# Patient Record
Sex: MALE | Race: WHITE | NOT HISPANIC OR LATINO | Employment: OTHER | URBAN - METROPOLITAN AREA
[De-identification: names, ages, dates, MRNs, and addresses within clinical notes are randomized per-mention and may not be internally consistent; named-entity substitution may affect disease eponyms.]

---

## 2017-01-19 ENCOUNTER — HOSPITAL ENCOUNTER (INPATIENT)
Facility: HOSPITAL | Age: 77
LOS: 12 days | Discharge: HOME HEALTH | End: 2017-01-31
Attending: SPECIALIST | Admitting: SPECIALIST

## 2017-01-19 ENCOUNTER — HOSPITAL ENCOUNTER (EMERGENCY)
Facility: HOSPITAL | Age: 77
Discharge: PSYCHIATRIC OR SUBSTANCE USE FACILITY | End: 2017-01-19
Attending: EMERGENCY MEDICINE | Admitting: EMERGENCY MEDICINE

## 2017-01-19 VITALS
HEART RATE: 81 BPM | TEMPERATURE: 97 F | SYSTOLIC BLOOD PRESSURE: 165 MMHG | BODY MASS INDEX: 29.62 KG/M2 | DIASTOLIC BLOOD PRESSURE: 83 MMHG | RESPIRATION RATE: 16 BRPM | WEIGHT: 200 LBS | OXYGEN SATURATION: 98 % | HEIGHT: 69 IN

## 2017-01-19 DIAGNOSIS — F32.9 MAJOR DEPRESSIVE DISORDER WITH SINGLE EPISODE, REMISSION STATUS UNSPECIFIED: Primary | ICD-10-CM

## 2017-01-19 PROBLEM — F32.A DEPRESSION WITH SUICIDAL IDEATION: Status: ACTIVE | Noted: 2017-01-19

## 2017-01-19 PROBLEM — R45.851 DEPRESSION WITH SUICIDAL IDEATION: Status: ACTIVE | Noted: 2017-01-19

## 2017-01-19 LAB
ALBUMIN SERPL-MCNC: 3.9 G/DL (ref 3.5–5.2)
ALBUMIN/GLOB SERPL: 1.4 G/DL
ALP SERPL-CCNC: 108 U/L (ref 39–117)
ALT SERPL W P-5'-P-CCNC: 19 U/L (ref 1–41)
AMPHET+METHAMPHET UR QL: NEGATIVE
ANION GAP SERPL CALCULATED.3IONS-SCNC: 12.4 MMOL/L
APAP SERPL-MCNC: <5 MCG/ML (ref 10–30)
AST SERPL-CCNC: 15 U/L (ref 1–40)
BACTERIA UR QL AUTO: ABNORMAL /HPF
BARBITURATES UR QL SCN: NEGATIVE
BASOPHILS # BLD AUTO: 0.02 10*3/MM3 (ref 0–0.2)
BASOPHILS NFR BLD AUTO: 0.2 % (ref 0–1.5)
BENZODIAZ UR QL SCN: NEGATIVE
BILIRUB SERPL-MCNC: 0.2 MG/DL (ref 0.1–1.2)
BILIRUB UR QL STRIP: NEGATIVE
BUN BLD-MCNC: 19 MG/DL (ref 8–23)
BUN/CREAT SERPL: 31.7 (ref 7–25)
CALCIUM SPEC-SCNC: 9.2 MG/DL (ref 8.6–10.5)
CANNABINOIDS SERPL QL: NEGATIVE
CHLORIDE SERPL-SCNC: 100 MMOL/L (ref 98–107)
CLARITY UR: CLEAR
CO2 SERPL-SCNC: 25.6 MMOL/L (ref 22–29)
COCAINE UR QL: NEGATIVE
COLOR UR: YELLOW
CREAT BLD-MCNC: 0.6 MG/DL (ref 0.76–1.27)
DEPRECATED RDW RBC AUTO: 51.7 FL (ref 37–54)
EOSINOPHIL # BLD AUTO: 0.09 10*3/MM3 (ref 0–0.7)
EOSINOPHIL NFR BLD AUTO: 1.1 % (ref 0.3–6.2)
ERYTHROCYTE [DISTWIDTH] IN BLOOD BY AUTOMATED COUNT: 14.9 % (ref 11.5–14.5)
ETHANOL BLD-MCNC: <10 MG/DL (ref 0–10)
ETHANOL UR QL: <0.01 %
GFR SERPL CREATININE-BSD FRML MDRD: 131 ML/MIN/1.73
GLOBULIN UR ELPH-MCNC: 2.8 GM/DL
GLUCOSE BLD-MCNC: 116 MG/DL (ref 65–99)
GLUCOSE UR STRIP-MCNC: NEGATIVE MG/DL
HCT VFR BLD AUTO: 49.7 % (ref 40.4–52.2)
HGB BLD-MCNC: 15.3 G/DL (ref 13.7–17.6)
HGB UR QL STRIP.AUTO: NEGATIVE
HOLD SPECIMEN: NORMAL
HYALINE CASTS UR QL AUTO: ABNORMAL /LPF
IMM GRANULOCYTES # BLD: 0.02 10*3/MM3 (ref 0–0.03)
IMM GRANULOCYTES NFR BLD: 0.2 % (ref 0–0.5)
KETONES UR QL STRIP: ABNORMAL
LEUKOCYTE ESTERASE UR QL STRIP.AUTO: ABNORMAL
LYMPHOCYTES # BLD AUTO: 0.86 10*3/MM3 (ref 0.9–4.8)
LYMPHOCYTES NFR BLD AUTO: 10.3 % (ref 19.6–45.3)
MCH RBC QN AUTO: 29.2 PG (ref 27–32.7)
MCHC RBC AUTO-ENTMCNC: 30.8 G/DL (ref 32.6–36.4)
MCV RBC AUTO: 94.8 FL (ref 79.8–96.2)
METHADONE UR QL SCN: NEGATIVE
MONOCYTES # BLD AUTO: 0.82 10*3/MM3 (ref 0.2–1.2)
MONOCYTES NFR BLD AUTO: 9.8 % (ref 5–12)
NEUTROPHILS # BLD AUTO: 6.55 10*3/MM3 (ref 1.9–8.1)
NEUTROPHILS NFR BLD AUTO: 78.4 % (ref 42.7–76)
NITRITE UR QL STRIP: NEGATIVE
NRBC BLD MANUAL-RTO: 0 /100 WBC (ref 0–0)
OPIATES UR QL: NEGATIVE
OXYCODONE UR QL SCN: NEGATIVE
PH UR STRIP.AUTO: 7.5 [PH] (ref 5–8)
PLATELET # BLD AUTO: 228 10*3/MM3 (ref 140–500)
PMV BLD AUTO: 10.2 FL (ref 6–12)
POTASSIUM BLD-SCNC: 4.1 MMOL/L (ref 3.5–5.2)
PROT SERPL-MCNC: 6.7 G/DL (ref 6–8.5)
PROT UR QL STRIP: NEGATIVE
RBC # BLD AUTO: 5.24 10*6/MM3 (ref 4.6–6)
RBC # UR: ABNORMAL /HPF
REF LAB TEST METHOD: ABNORMAL
SALICYLATES SERPL-MCNC: <0.3 MG/DL
SODIUM BLD-SCNC: 138 MMOL/L (ref 136–145)
SP GR UR STRIP: 1.01 (ref 1–1.03)
SQUAMOUS #/AREA URNS HPF: ABNORMAL /HPF
UROBILINOGEN UR QL STRIP: ABNORMAL
WBC NRBC COR # BLD: 8.36 10*3/MM3 (ref 4.5–10.7)
WBC UR QL AUTO: ABNORMAL /HPF
WHOLE BLOOD HOLD SPECIMEN: NORMAL
WHOLE BLOOD HOLD SPECIMEN: NORMAL

## 2017-01-19 RX ORDER — BACLOFEN 20 MG/1
20 TABLET ORAL ONCE
Status: COMPLETED | OUTPATIENT
Start: 2017-01-19 | End: 2017-01-20

## 2017-01-19 RX ORDER — METFORMIN HYDROCHLORIDE 500 MG/1
1000 TABLET, EXTENDED RELEASE ORAL ONCE
Status: DISCONTINUED | OUTPATIENT
Start: 2017-01-19 | End: 2017-01-20 | Stop reason: SDUPTHER

## 2017-01-19 RX ORDER — PRAZOSIN HYDROCHLORIDE 1 MG/1
1 CAPSULE ORAL ONCE
Status: COMPLETED | OUTPATIENT
Start: 2017-01-19 | End: 2017-01-20

## 2017-01-19 RX ORDER — TAMSULOSIN HYDROCHLORIDE 0.4 MG/1
0.4 CAPSULE ORAL ONCE
Status: DISCONTINUED | OUTPATIENT
Start: 2017-01-19 | End: 2017-01-20 | Stop reason: SDUPTHER

## 2017-01-19 RX ORDER — CALCIUM POLYCARBOPHIL 625 MG 625 MG/1
1250 TABLET ORAL ONCE
Status: DISCONTINUED | OUTPATIENT
Start: 2017-01-19 | End: 2017-01-20 | Stop reason: SDUPTHER

## 2017-01-19 RX ORDER — IPRATROPIUM BROMIDE AND ALBUTEROL SULFATE 2.5; .5 MG/3ML; MG/3ML
3 SOLUTION RESPIRATORY (INHALATION)
Status: DISCONTINUED | OUTPATIENT
Start: 2017-01-19 | End: 2017-01-31 | Stop reason: HOSPADM

## 2017-01-19 RX ORDER — TRAZODONE HYDROCHLORIDE 100 MG/1
100 TABLET ORAL ONCE
Status: COMPLETED | OUTPATIENT
Start: 2017-01-19 | End: 2017-01-20

## 2017-01-19 RX ORDER — DOCUSATE CALCIUM 240 MG
240 CAPSULE ORAL ONCE
Status: DISCONTINUED | OUTPATIENT
Start: 2017-01-19 | End: 2017-01-20 | Stop reason: SDUPTHER

## 2017-01-19 RX ORDER — POTASSIUM CHLORIDE 750 MG/1
40 CAPSULE, EXTENDED RELEASE ORAL ONCE
Status: DISCONTINUED | OUTPATIENT
Start: 2017-01-19 | End: 2017-01-31 | Stop reason: HOSPADM

## 2017-01-19 RX ORDER — ZOLPIDEM TARTRATE 5 MG/1
5 TABLET ORAL NIGHTLY
Status: DISPENSED | OUTPATIENT
Start: 2017-01-19 | End: 2017-01-23

## 2017-01-20 PROBLEM — I10 HYPERTENSION: Status: ACTIVE | Noted: 2017-01-20

## 2017-01-20 PROBLEM — G80.9 CEREBRAL PALSY (HCC): Status: ACTIVE | Noted: 2017-01-20

## 2017-01-20 PROBLEM — K59.01 SLOW TRANSIT CONSTIPATION: Status: ACTIVE | Noted: 2017-01-20

## 2017-01-20 LAB
GLUCOSE BLDC GLUCOMTR-MCNC: 109 MG/DL (ref 70–130)
GLUCOSE BLDC GLUCOMTR-MCNC: 111 MG/DL (ref 70–130)
GLUCOSE BLDC GLUCOMTR-MCNC: 125 MG/DL (ref 70–130)
GLUCOSE BLDC GLUCOMTR-MCNC: 92 MG/DL (ref 70–130)

## 2017-01-20 PROCEDURE — 94640 AIRWAY INHALATION TREATMENT: CPT

## 2017-01-20 PROCEDURE — 74000 HC ABDOMEN KUB: CPT

## 2017-01-20 PROCEDURE — 82962 GLUCOSE BLOOD TEST: CPT

## 2017-01-20 PROCEDURE — 94799 UNLISTED PULMONARY SVC/PX: CPT

## 2017-01-20 RX ORDER — DOCUSATE SODIUM 250 MG
250 CAPSULE ORAL 2 TIMES DAILY
Status: DISCONTINUED | OUTPATIENT
Start: 2017-01-20 | End: 2017-01-31 | Stop reason: HOSPADM

## 2017-01-20 RX ORDER — TRAZODONE HYDROCHLORIDE 100 MG/1
100 TABLET ORAL NIGHTLY
Status: ON HOLD | COMMUNITY
End: 2021-11-06

## 2017-01-20 RX ORDER — METOPROLOL SUCCINATE 100 MG/1
100 TABLET, EXTENDED RELEASE ORAL DAILY
Status: ON HOLD | COMMUNITY
End: 2021-11-06

## 2017-01-20 RX ORDER — ALBUTEROL SULFATE 4 MG/1
4 TABLET ORAL 3 TIMES DAILY
Status: DISCONTINUED | OUTPATIENT
Start: 2017-01-20 | End: 2017-01-31 | Stop reason: HOSPADM

## 2017-01-20 RX ORDER — CIPROFLOXACIN 250 MG/1
250 TABLET, FILM COATED ORAL 2 TIMES DAILY
Status: ON HOLD | COMMUNITY
End: 2021-11-06

## 2017-01-20 RX ORDER — PRAZOSIN HYDROCHLORIDE 1 MG/1
1 CAPSULE ORAL 2 TIMES DAILY
Status: DISCONTINUED | OUTPATIENT
Start: 2017-01-20 | End: 2017-01-31 | Stop reason: HOSPADM

## 2017-01-20 RX ORDER — SENNA AND DOCUSATE SODIUM 50; 8.6 MG/1; MG/1
2 TABLET, FILM COATED ORAL NIGHTLY
Status: DISCONTINUED | OUTPATIENT
Start: 2017-01-20 | End: 2017-01-22

## 2017-01-20 RX ORDER — ASCORBIC ACID 500 MG
500 TABLET ORAL DAILY
Status: ON HOLD | COMMUNITY
End: 2021-11-06

## 2017-01-20 RX ORDER — DOCUSATE SODIUM 250 MG
250 CAPSULE ORAL 2 TIMES DAILY
Status: ON HOLD | COMMUNITY
End: 2021-11-06

## 2017-01-20 RX ORDER — PRAZOSIN HYDROCHLORIDE 1 MG/1
1 CAPSULE ORAL NIGHTLY
Status: DISCONTINUED | OUTPATIENT
Start: 2017-01-20 | End: 2017-01-20 | Stop reason: SDUPTHER

## 2017-01-20 RX ORDER — BACLOFEN 20 MG/1
20 TABLET ORAL 3 TIMES DAILY
Status: DISCONTINUED | OUTPATIENT
Start: 2017-01-20 | End: 2017-01-31 | Stop reason: HOSPADM

## 2017-01-20 RX ORDER — METFORMIN HYDROCHLORIDE 500 MG/1
1000 TABLET, EXTENDED RELEASE ORAL 2 TIMES DAILY
Status: ON HOLD | COMMUNITY
End: 2021-11-06

## 2017-01-20 RX ORDER — CETIRIZINE HYDROCHLORIDE 10 MG/1
10 TABLET ORAL DAILY
Status: DISCONTINUED | OUTPATIENT
Start: 2017-01-20 | End: 2017-01-31 | Stop reason: HOSPADM

## 2017-01-20 RX ORDER — TRIAMCINOLONE ACETONIDE 1 MG/G
1 CREAM TOPICAL 2 TIMES DAILY
Status: DISCONTINUED | OUTPATIENT
Start: 2017-01-20 | End: 2017-01-31 | Stop reason: HOSPADM

## 2017-01-20 RX ORDER — PRAVASTATIN SODIUM 20 MG
40 TABLET ORAL NIGHTLY
COMMUNITY
End: 2021-11-10 | Stop reason: HOSPADM

## 2017-01-20 RX ORDER — LACTULOSE 10 G/15ML
10 SOLUTION ORAL NIGHTLY
Status: ON HOLD | COMMUNITY
End: 2021-11-06

## 2017-01-20 RX ORDER — POLYETHYLENE GLYCOL 3350 17 G/17G
17 POWDER, FOR SOLUTION ORAL 2 TIMES DAILY
Status: DISCONTINUED | OUTPATIENT
Start: 2017-01-20 | End: 2017-01-22

## 2017-01-20 RX ORDER — ALBUTEROL SULFATE 4 MG/1
4 TABLET ORAL 3 TIMES DAILY
Status: ON HOLD | COMMUNITY
End: 2021-11-06

## 2017-01-20 RX ORDER — PRAZOSIN HYDROCHLORIDE 1 MG/1
1 CAPSULE ORAL 2 TIMES DAILY
Status: ON HOLD | COMMUNITY
End: 2021-11-06

## 2017-01-20 RX ORDER — ALFUZOSIN HYDROCHLORIDE 10 MG/1
10 TABLET, EXTENDED RELEASE ORAL NIGHTLY
COMMUNITY
End: 2021-11-10 | Stop reason: HOSPADM

## 2017-01-20 RX ORDER — BACLOFEN 20 MG/1
20 TABLET ORAL 3 TIMES DAILY
COMMUNITY
End: 2021-11-10 | Stop reason: HOSPADM

## 2017-01-20 RX ORDER — PSYLLIUM SEED (WITH DEXTROSE)
2 POWDER (GRAM) ORAL 2 TIMES DAILY
Status: DISCONTINUED | OUTPATIENT
Start: 2017-01-20 | End: 2017-01-20

## 2017-01-20 RX ORDER — CIPROFLOXACIN 250 MG/1
250 TABLET, FILM COATED ORAL EVERY 12 HOURS SCHEDULED
Status: DISCONTINUED | OUTPATIENT
Start: 2017-01-20 | End: 2017-01-31 | Stop reason: HOSPADM

## 2017-01-20 RX ORDER — POTASSIUM CHLORIDE 750 MG/1
20 CAPSULE, EXTENDED RELEASE ORAL DAILY
Status: DISCONTINUED | OUTPATIENT
Start: 2017-01-20 | End: 2017-01-31 | Stop reason: HOSPADM

## 2017-01-20 RX ORDER — LACTULOSE 10 G/15ML
30 SOLUTION ORAL 2 TIMES DAILY
Status: DISCONTINUED | OUTPATIENT
Start: 2017-01-20 | End: 2017-01-22

## 2017-01-20 RX ORDER — LORATADINE 10 MG/1
10 CAPSULE, LIQUID FILLED ORAL DAILY
COMMUNITY
End: 2021-11-10 | Stop reason: HOSPADM

## 2017-01-20 RX ORDER — ASCORBIC ACID 500 MG
500 TABLET ORAL DAILY
Status: DISCONTINUED | OUTPATIENT
Start: 2017-01-20 | End: 2017-01-31 | Stop reason: HOSPADM

## 2017-01-20 RX ORDER — LACTULOSE 10 G/15ML
10 SOLUTION ORAL NIGHTLY
Status: DISCONTINUED | OUTPATIENT
Start: 2017-01-20 | End: 2017-01-20

## 2017-01-20 RX ORDER — LISINOPRIL 2.5 MG/1
2.5 TABLET ORAL DAILY
Status: ON HOLD | COMMUNITY
End: 2021-11-06

## 2017-01-20 RX ORDER — METFORMIN HYDROCHLORIDE 500 MG/1
1000 TABLET, EXTENDED RELEASE ORAL 2 TIMES DAILY
Status: DISCONTINUED | OUTPATIENT
Start: 2017-01-20 | End: 2017-01-31 | Stop reason: HOSPADM

## 2017-01-20 RX ORDER — POTASSIUM CHLORIDE 20 MEQ/1
40 TABLET, EXTENDED RELEASE ORAL 2 TIMES DAILY
Status: ON HOLD | COMMUNITY
End: 2021-11-06

## 2017-01-20 RX ORDER — TRIAMCINOLONE ACETONIDE 1 MG/G
1 CREAM TOPICAL 2 TIMES DAILY
Status: ON HOLD | COMMUNITY
End: 2021-11-06

## 2017-01-20 RX ORDER — BACLOFEN 20 MG/1
20 TABLET ORAL DAILY
Status: DISCONTINUED | OUTPATIENT
Start: 2017-01-20 | End: 2017-01-20 | Stop reason: SDUPTHER

## 2017-01-20 RX ORDER — ZOLPIDEM TARTRATE 10 MG/1
10 TABLET ORAL NIGHTLY
Status: ON HOLD | COMMUNITY
End: 2021-11-06

## 2017-01-20 RX ORDER — FLUTICASONE PROPIONATE 50 MCG
1 SPRAY, SUSPENSION (ML) NASAL DAILY
Status: ON HOLD | COMMUNITY
End: 2021-11-06

## 2017-01-20 RX ORDER — FLUTICASONE PROPIONATE 50 MCG
1 SPRAY, SUSPENSION (ML) NASAL DAILY
Status: DISCONTINUED | OUTPATIENT
Start: 2017-01-20 | End: 2017-01-31 | Stop reason: HOSPADM

## 2017-01-20 RX ORDER — PRAVASTATIN SODIUM 20 MG
20 TABLET ORAL NIGHTLY
Status: DISCONTINUED | OUTPATIENT
Start: 2017-01-20 | End: 2017-01-31 | Stop reason: HOSPADM

## 2017-01-20 RX ORDER — ZOLPIDEM TARTRATE 10 MG/1
10 TABLET ORAL NIGHTLY
Status: DISCONTINUED | OUTPATIENT
Start: 2017-01-20 | End: 2017-01-20 | Stop reason: SDUPTHER

## 2017-01-20 RX ORDER — ACETAMINOPHEN 325 MG/1
650 TABLET ORAL EVERY 6 HOURS PRN
Status: DISCONTINUED | OUTPATIENT
Start: 2017-01-20 | End: 2017-01-31 | Stop reason: HOSPADM

## 2017-01-20 RX ORDER — BISACODYL 10 MG
10 SUPPOSITORY, RECTAL RECTAL DAILY PRN
Status: DISCONTINUED | OUTPATIENT
Start: 2017-01-20 | End: 2017-01-31 | Stop reason: HOSPADM

## 2017-01-20 RX ORDER — BUPROPION HYDROCHLORIDE 150 MG/1
450 TABLET ORAL EVERY MORNING
COMMUNITY
End: 2017-01-31 | Stop reason: HOSPADM

## 2017-01-20 RX ORDER — SERTRALINE HYDROCHLORIDE 100 MG/1
200 TABLET, FILM COATED ORAL DAILY
Status: DISCONTINUED | OUTPATIENT
Start: 2017-01-20 | End: 2017-01-31 | Stop reason: HOSPADM

## 2017-01-20 RX ORDER — PSYLLIUM SEED (WITH DEXTROSE)
2 POWDER (GRAM) ORAL 2 TIMES DAILY
Status: ON HOLD | COMMUNITY
End: 2021-11-06

## 2017-01-20 RX ORDER — LISINOPRIL 2.5 MG/1
2.5 TABLET ORAL DAILY
Status: DISCONTINUED | OUTPATIENT
Start: 2017-01-20 | End: 2017-01-31 | Stop reason: HOSPADM

## 2017-01-20 RX ORDER — METOPROLOL SUCCINATE 100 MG/1
100 TABLET, EXTENDED RELEASE ORAL DAILY
Status: DISCONTINUED | OUTPATIENT
Start: 2017-01-20 | End: 2017-01-31 | Stop reason: HOSPADM

## 2017-01-20 RX ADMIN — PRAZOSIN HYDROCHLORIDE 1 MG: 1 CAPSULE ORAL at 00:34

## 2017-01-20 RX ADMIN — CIPROFLOXACIN 250 MG: 250 TABLET, FILM COATED ORAL at 20:16

## 2017-01-20 RX ADMIN — CALCIUM CARBONATE-CHOLECALCIFEROL TAB 250 MG-125 UNIT 2 TABLET: 250-125 TAB at 20:16

## 2017-01-20 RX ADMIN — PRAVASTATIN SODIUM 20 MG: 20 TABLET ORAL at 20:16

## 2017-01-20 RX ADMIN — IPRATROPIUM BROMIDE AND ALBUTEROL SULFATE 3 ML: .5; 3 SOLUTION RESPIRATORY (INHALATION) at 12:20

## 2017-01-20 RX ADMIN — PRAZOSIN HYDROCHLORIDE 1 MG: 1 CAPSULE ORAL at 13:09

## 2017-01-20 RX ADMIN — BACLOFEN 20 MG: 20 TABLET ORAL at 00:33

## 2017-01-20 RX ADMIN — PRAZOSIN HYDROCHLORIDE 1 MG: 1 CAPSULE ORAL at 20:16

## 2017-01-20 RX ADMIN — ZOLPIDEM TARTRATE 5 MG: 5 TABLET, FILM COATED ORAL at 22:13

## 2017-01-20 RX ADMIN — TRAZODONE HYDROCHLORIDE 100 MG: 100 TABLET, FILM COATED ORAL at 00:34

## 2017-01-20 RX ADMIN — DOCUSATE SODIUM 250 MG: 250 CAPSULE, LIQUID FILLED ORAL at 13:08

## 2017-01-20 RX ADMIN — TRIAMCINOLONE ACETONIDE 1 APPLICATION: 1 CREAM TOPICAL at 20:24

## 2017-01-20 RX ADMIN — LISINOPRIL 2.5 MG: 2.5 TABLET ORAL at 13:09

## 2017-01-20 RX ADMIN — IPRATROPIUM BROMIDE AND ALBUTEROL SULFATE 3 ML: .5; 3 SOLUTION RESPIRATORY (INHALATION) at 21:39

## 2017-01-20 RX ADMIN — Medication 2 WAFER: at 13:10

## 2017-01-20 RX ADMIN — METOPROLOL SUCCINATE 100 MG: 100 TABLET, FILM COATED, EXTENDED RELEASE ORAL at 13:10

## 2017-01-20 RX ADMIN — BACLOFEN 20 MG: 20 TABLET ORAL at 20:16

## 2017-01-20 RX ADMIN — METFORMIN HYDROCHLORIDE 1000 MG: 500 TABLET, EXTENDED RELEASE ORAL at 17:37

## 2017-01-20 RX ADMIN — CALCIUM CARBONATE-CHOLECALCIFEROL TAB 250 MG-125 UNIT 2 TABLET: 250-125 TAB at 16:06

## 2017-01-20 RX ADMIN — ACETAMINOPHEN 650 MG: 325 TABLET ORAL at 16:06

## 2017-01-20 RX ADMIN — IPRATROPIUM BROMIDE AND ALBUTEROL SULFATE 3 ML: .5; 3 SOLUTION RESPIRATORY (INHALATION) at 15:15

## 2017-01-20 RX ADMIN — ALBUTEROL SULFATE 4 MG: 4 TABLET ORAL at 16:06

## 2017-01-20 RX ADMIN — LACTULOSE 30 G: 10 SOLUTION ORAL at 17:36

## 2017-01-20 RX ADMIN — BACLOFEN 20 MG: 20 TABLET ORAL at 13:10

## 2017-01-20 RX ADMIN — POLYETHYLENE GLYCOL 3350 17 G: 17 POWDER, FOR SOLUTION ORAL at 17:36

## 2017-01-20 RX ADMIN — FLUTICASONE PROPIONATE 1 SPRAY: 50 SPRAY, METERED NASAL at 13:10

## 2017-01-21 LAB
GLUCOSE BLDC GLUCOMTR-MCNC: 102 MG/DL (ref 70–130)
GLUCOSE BLDC GLUCOMTR-MCNC: 110 MG/DL (ref 70–130)
GLUCOSE BLDC GLUCOMTR-MCNC: 116 MG/DL (ref 70–130)
GLUCOSE BLDC GLUCOMTR-MCNC: 149 MG/DL (ref 70–130)

## 2017-01-21 PROCEDURE — 94799 UNLISTED PULMONARY SVC/PX: CPT

## 2017-01-21 PROCEDURE — 94640 AIRWAY INHALATION TREATMENT: CPT

## 2017-01-21 PROCEDURE — 82962 GLUCOSE BLOOD TEST: CPT

## 2017-01-21 RX ADMIN — DOCUSATE SODIUM -SENNOSIDES 2 TABLET: 50; 8.6 TABLET, COATED ORAL at 00:48

## 2017-01-21 RX ADMIN — BACLOFEN 20 MG: 20 TABLET ORAL at 20:26

## 2017-01-21 RX ADMIN — FLUTICASONE PROPIONATE 1 SPRAY: 50 SPRAY, METERED NASAL at 09:52

## 2017-01-21 RX ADMIN — SERTRALINE 200 MG: 100 TABLET, FILM COATED ORAL at 09:49

## 2017-01-21 RX ADMIN — CIPROFLOXACIN 250 MG: 250 TABLET, FILM COATED ORAL at 09:52

## 2017-01-21 RX ADMIN — CIPROFLOXACIN 250 MG: 250 TABLET, FILM COATED ORAL at 20:26

## 2017-01-21 RX ADMIN — IPRATROPIUM BROMIDE AND ALBUTEROL SULFATE 3 ML: .5; 3 SOLUTION RESPIRATORY (INHALATION) at 16:30

## 2017-01-21 RX ADMIN — OXYCODONE HYDROCHLORIDE AND ACETAMINOPHEN 500 MG: 500 TABLET ORAL at 09:50

## 2017-01-21 RX ADMIN — BACLOFEN 20 MG: 20 TABLET ORAL at 09:52

## 2017-01-21 RX ADMIN — METFORMIN HYDROCHLORIDE 1000 MG: 500 TABLET, EXTENDED RELEASE ORAL at 17:28

## 2017-01-21 RX ADMIN — POLYETHYLENE GLYCOL 3350 17 G: 17 POWDER, FOR SOLUTION ORAL at 09:58

## 2017-01-21 RX ADMIN — METFORMIN HYDROCHLORIDE 1000 MG: 500 TABLET, EXTENDED RELEASE ORAL at 09:51

## 2017-01-21 RX ADMIN — ZOLPIDEM TARTRATE 5 MG: 5 TABLET, FILM COATED ORAL at 20:26

## 2017-01-21 RX ADMIN — POTASSIUM CHLORIDE 20 MEQ: 750 CAPSULE, EXTENDED RELEASE ORAL at 09:51

## 2017-01-21 RX ADMIN — METOPROLOL SUCCINATE 100 MG: 100 TABLET, FILM COATED, EXTENDED RELEASE ORAL at 09:50

## 2017-01-21 RX ADMIN — CETIRIZINE HYDROCHLORIDE 10 MG: 10 TABLET, FILM COATED ORAL at 09:51

## 2017-01-21 RX ADMIN — CALCIUM CARBONATE-CHOLECALCIFEROL TAB 250 MG-125 UNIT 2 TABLET: 250-125 TAB at 09:50

## 2017-01-21 RX ADMIN — PRAZOSIN HYDROCHLORIDE 1 MG: 1 CAPSULE ORAL at 17:27

## 2017-01-21 RX ADMIN — DOCUSATE SODIUM 250 MG: 250 CAPSULE, LIQUID FILLED ORAL at 09:52

## 2017-01-21 RX ADMIN — IPRATROPIUM BROMIDE AND ALBUTEROL SULFATE 3 ML: .5; 3 SOLUTION RESPIRATORY (INHALATION) at 11:58

## 2017-01-21 RX ADMIN — DOCUSATE SODIUM -SENNOSIDES 2 TABLET: 50; 8.6 TABLET, COATED ORAL at 20:26

## 2017-01-21 RX ADMIN — ALBUTEROL SULFATE 4 MG: 4 TABLET ORAL at 09:49

## 2017-01-21 RX ADMIN — CALCIUM CARBONATE-CHOLECALCIFEROL TAB 250 MG-125 UNIT 2 TABLET: 250-125 TAB at 17:27

## 2017-01-21 RX ADMIN — CALCIUM CARBONATE-CHOLECALCIFEROL TAB 250 MG-125 UNIT 2 TABLET: 250-125 TAB at 20:26

## 2017-01-21 RX ADMIN — ALBUTEROL SULFATE 4 MG: 4 TABLET ORAL at 20:26

## 2017-01-21 RX ADMIN — PRAZOSIN HYDROCHLORIDE 1 MG: 1 CAPSULE ORAL at 09:52

## 2017-01-21 RX ADMIN — BACLOFEN 20 MG: 20 TABLET ORAL at 17:27

## 2017-01-21 RX ADMIN — PRAVASTATIN SODIUM 20 MG: 20 TABLET ORAL at 20:26

## 2017-01-21 RX ADMIN — BUPROPION HYDROCHLORIDE 450 MG: 300 TABLET, EXTENDED RELEASE ORAL at 09:50

## 2017-01-21 RX ADMIN — LISINOPRIL 2.5 MG: 2.5 TABLET ORAL at 09:54

## 2017-01-21 RX ADMIN — IPRATROPIUM BROMIDE AND ALBUTEROL SULFATE 3 ML: .5; 3 SOLUTION RESPIRATORY (INHALATION) at 08:37

## 2017-01-21 RX ADMIN — LACTULOSE 30 G: 10 SOLUTION ORAL at 09:52

## 2017-01-21 RX ADMIN — TRIAMCINOLONE ACETONIDE 1 APPLICATION: 1 CREAM TOPICAL at 09:52

## 2017-01-21 RX ADMIN — IPRATROPIUM BROMIDE AND ALBUTEROL SULFATE 3 ML: .5; 3 SOLUTION RESPIRATORY (INHALATION) at 19:49

## 2017-01-21 RX ADMIN — ALBUTEROL SULFATE 4 MG: 4 TABLET ORAL at 17:28

## 2017-01-22 LAB
GLUCOSE BLDC GLUCOMTR-MCNC: 101 MG/DL (ref 70–130)
GLUCOSE BLDC GLUCOMTR-MCNC: 108 MG/DL (ref 70–130)
GLUCOSE BLDC GLUCOMTR-MCNC: 129 MG/DL (ref 70–130)

## 2017-01-22 PROCEDURE — 94640 AIRWAY INHALATION TREATMENT: CPT

## 2017-01-22 PROCEDURE — 82962 GLUCOSE BLOOD TEST: CPT

## 2017-01-22 RX ORDER — LACTULOSE 10 G/15ML
15 SOLUTION ORAL DAILY
Status: DISCONTINUED | OUTPATIENT
Start: 2017-01-23 | End: 2017-01-31 | Stop reason: HOSPADM

## 2017-01-22 RX ADMIN — LISINOPRIL 2.5 MG: 2.5 TABLET ORAL at 09:14

## 2017-01-22 RX ADMIN — BACLOFEN 20 MG: 20 TABLET ORAL at 09:11

## 2017-01-22 RX ADMIN — BUPROPION HYDROCHLORIDE 450 MG: 300 TABLET, EXTENDED RELEASE ORAL at 09:10

## 2017-01-22 RX ADMIN — PRAVASTATIN SODIUM 20 MG: 20 TABLET ORAL at 20:43

## 2017-01-22 RX ADMIN — BACLOFEN 20 MG: 20 TABLET ORAL at 20:43

## 2017-01-22 RX ADMIN — CALCIUM CARBONATE-CHOLECALCIFEROL TAB 250 MG-125 UNIT 2 TABLET: 250-125 TAB at 10:15

## 2017-01-22 RX ADMIN — IPRATROPIUM BROMIDE AND ALBUTEROL SULFATE 3 ML: .5; 3 SOLUTION RESPIRATORY (INHALATION) at 06:37

## 2017-01-22 RX ADMIN — FLUTICASONE PROPIONATE 1 SPRAY: 50 SPRAY, METERED NASAL at 10:20

## 2017-01-22 RX ADMIN — CALCIUM CARBONATE-CHOLECALCIFEROL TAB 250 MG-125 UNIT 2 TABLET: 250-125 TAB at 20:43

## 2017-01-22 RX ADMIN — DOCUSATE SODIUM 250 MG: 250 CAPSULE, LIQUID FILLED ORAL at 18:02

## 2017-01-22 RX ADMIN — PRAZOSIN HYDROCHLORIDE 1 MG: 1 CAPSULE ORAL at 09:15

## 2017-01-22 RX ADMIN — METFORMIN HYDROCHLORIDE 1000 MG: 500 TABLET, EXTENDED RELEASE ORAL at 18:02

## 2017-01-22 RX ADMIN — OXYCODONE HYDROCHLORIDE AND ACETAMINOPHEN 500 MG: 500 TABLET ORAL at 09:15

## 2017-01-22 RX ADMIN — CIPROFLOXACIN 250 MG: 250 TABLET, FILM COATED ORAL at 20:43

## 2017-01-22 RX ADMIN — TRIAMCINOLONE ACETONIDE 1 APPLICATION: 1 CREAM TOPICAL at 10:21

## 2017-01-22 RX ADMIN — CALCIUM CARBONATE-CHOLECALCIFEROL TAB 250 MG-125 UNIT 2 TABLET: 250-125 TAB at 15:30

## 2017-01-22 RX ADMIN — TRIAMCINOLONE ACETONIDE 1 APPLICATION: 1 CREAM TOPICAL at 18:02

## 2017-01-22 RX ADMIN — METOPROLOL SUCCINATE 100 MG: 100 TABLET, FILM COATED, EXTENDED RELEASE ORAL at 09:17

## 2017-01-22 RX ADMIN — SERTRALINE 200 MG: 100 TABLET, FILM COATED ORAL at 09:20

## 2017-01-22 RX ADMIN — PRAZOSIN HYDROCHLORIDE 1 MG: 1 CAPSULE ORAL at 20:43

## 2017-01-22 RX ADMIN — IPRATROPIUM BROMIDE AND ALBUTEROL SULFATE 3 ML: .5; 3 SOLUTION RESPIRATORY (INHALATION) at 10:38

## 2017-01-22 RX ADMIN — CIPROFLOXACIN 250 MG: 250 TABLET, FILM COATED ORAL at 09:15

## 2017-01-22 RX ADMIN — ALBUTEROL SULFATE 4 MG: 4 TABLET ORAL at 14:38

## 2017-01-22 RX ADMIN — IPRATROPIUM BROMIDE AND ALBUTEROL SULFATE 3 ML: .5; 3 SOLUTION RESPIRATORY (INHALATION) at 19:46

## 2017-01-22 RX ADMIN — METFORMIN HYDROCHLORIDE 1000 MG: 500 TABLET, EXTENDED RELEASE ORAL at 10:15

## 2017-01-22 RX ADMIN — ZOLPIDEM TARTRATE 5 MG: 5 TABLET, FILM COATED ORAL at 20:44

## 2017-01-22 RX ADMIN — CETIRIZINE HYDROCHLORIDE 10 MG: 10 TABLET, FILM COATED ORAL at 09:14

## 2017-01-22 RX ADMIN — POTASSIUM CHLORIDE 20 MEQ: 750 CAPSULE, EXTENDED RELEASE ORAL at 10:15

## 2017-01-22 RX ADMIN — BACLOFEN 20 MG: 20 TABLET ORAL at 16:47

## 2017-01-23 ENCOUNTER — DOCUMENTATION (OUTPATIENT)
Dept: PSYCHIATRY | Facility: HOSPITAL | Age: 77
End: 2017-01-23

## 2017-01-23 LAB
GLUCOSE BLDC GLUCOMTR-MCNC: 127 MG/DL (ref 70–130)
GLUCOSE BLDC GLUCOMTR-MCNC: 138 MG/DL (ref 70–130)
GLUCOSE BLDC GLUCOMTR-MCNC: 87 MG/DL (ref 70–130)
GLUCOSE BLDC GLUCOMTR-MCNC: 95 MG/DL (ref 70–130)

## 2017-01-23 PROCEDURE — 82962 GLUCOSE BLOOD TEST: CPT

## 2017-01-23 PROCEDURE — 94799 UNLISTED PULMONARY SVC/PX: CPT

## 2017-01-23 PROCEDURE — 94640 AIRWAY INHALATION TREATMENT: CPT

## 2017-01-23 RX ADMIN — TRIAMCINOLONE ACETONIDE 1 APPLICATION: 1 CREAM TOPICAL at 09:53

## 2017-01-23 RX ADMIN — ALBUTEROL SULFATE 4 MG: 4 TABLET ORAL at 20:24

## 2017-01-23 RX ADMIN — SERTRALINE 200 MG: 100 TABLET, FILM COATED ORAL at 09:29

## 2017-01-23 RX ADMIN — PRAZOSIN HYDROCHLORIDE 1 MG: 1 CAPSULE ORAL at 17:55

## 2017-01-23 RX ADMIN — POTASSIUM CHLORIDE 20 MEQ: 750 CAPSULE, EXTENDED RELEASE ORAL at 09:42

## 2017-01-23 RX ADMIN — BUPROPION HYDROCHLORIDE 450 MG: 300 TABLET, EXTENDED RELEASE ORAL at 09:29

## 2017-01-23 RX ADMIN — METOPROLOL SUCCINATE 100 MG: 100 TABLET, FILM COATED, EXTENDED RELEASE ORAL at 09:30

## 2017-01-23 RX ADMIN — ALBUTEROL SULFATE 4 MG: 4 TABLET ORAL at 15:15

## 2017-01-23 RX ADMIN — LACTULOSE 15.33 G: 10 SOLUTION ORAL at 09:45

## 2017-01-23 RX ADMIN — ALBUTEROL SULFATE 4 MG: 4 TABLET ORAL at 09:29

## 2017-01-23 RX ADMIN — BACLOFEN 20 MG: 20 TABLET ORAL at 15:15

## 2017-01-23 RX ADMIN — CIPROFLOXACIN 250 MG: 250 TABLET, FILM COATED ORAL at 20:24

## 2017-01-23 RX ADMIN — CIPROFLOXACIN 250 MG: 250 TABLET, FILM COATED ORAL at 09:30

## 2017-01-23 RX ADMIN — METFORMIN HYDROCHLORIDE 1000 MG: 500 TABLET, EXTENDED RELEASE ORAL at 17:55

## 2017-01-23 RX ADMIN — PRAZOSIN HYDROCHLORIDE 1 MG: 1 CAPSULE ORAL at 09:29

## 2017-01-23 RX ADMIN — BACLOFEN 20 MG: 20 TABLET ORAL at 20:24

## 2017-01-23 RX ADMIN — CALCIUM CARBONATE-CHOLECALCIFEROL TAB 250 MG-125 UNIT 2 TABLET: 250-125 TAB at 20:24

## 2017-01-23 RX ADMIN — LISINOPRIL 2.5 MG: 2.5 TABLET ORAL at 09:30

## 2017-01-23 RX ADMIN — TRIAMCINOLONE ACETONIDE 1 APPLICATION: 1 CREAM TOPICAL at 20:24

## 2017-01-23 RX ADMIN — IPRATROPIUM BROMIDE AND ALBUTEROL SULFATE 3 ML: .5; 3 SOLUTION RESPIRATORY (INHALATION) at 10:57

## 2017-01-23 RX ADMIN — CALCIUM CARBONATE-CHOLECALCIFEROL TAB 250 MG-125 UNIT 2 TABLET: 250-125 TAB at 15:15

## 2017-01-23 RX ADMIN — IPRATROPIUM BROMIDE AND ALBUTEROL SULFATE 3 ML: .5; 3 SOLUTION RESPIRATORY (INHALATION) at 19:52

## 2017-01-23 RX ADMIN — METFORMIN HYDROCHLORIDE 1000 MG: 500 TABLET, EXTENDED RELEASE ORAL at 09:30

## 2017-01-23 RX ADMIN — DOCUSATE SODIUM 250 MG: 250 CAPSULE, LIQUID FILLED ORAL at 09:30

## 2017-01-23 RX ADMIN — IPRATROPIUM BROMIDE AND ALBUTEROL SULFATE 3 ML: .5; 3 SOLUTION RESPIRATORY (INHALATION) at 07:51

## 2017-01-23 RX ADMIN — CETIRIZINE HYDROCHLORIDE 10 MG: 10 TABLET, FILM COATED ORAL at 09:30

## 2017-01-23 RX ADMIN — OXYCODONE HYDROCHLORIDE AND ACETAMINOPHEN 500 MG: 500 TABLET ORAL at 09:30

## 2017-01-23 RX ADMIN — BACLOFEN 20 MG: 20 TABLET ORAL at 09:29

## 2017-01-23 RX ADMIN — PRAVASTATIN SODIUM 20 MG: 20 TABLET ORAL at 20:24

## 2017-01-23 RX ADMIN — DOCUSATE SODIUM 250 MG: 250 CAPSULE, LIQUID FILLED ORAL at 17:55

## 2017-01-23 RX ADMIN — FLUTICASONE PROPIONATE 1 SPRAY: 50 SPRAY, METERED NASAL at 09:44

## 2017-01-23 RX ADMIN — CALCIUM CARBONATE-CHOLECALCIFEROL TAB 250 MG-125 UNIT 2 TABLET: 250-125 TAB at 09:30

## 2017-01-23 NOTE — PROGRESS NOTES
Pt was seen for brief neuropsychological evaluation. Pt was fully oriented, insightful about the nature of his admission and current status. Pt reported severe depression and anxiety, reporting passive SI, but no active thoughts of self harm. He acknowledged STM problems and believed they had been present for two years or more.  Pt also believed the cognitive symptoms worsened as his emotional distress worsened.  Cognitive testing was limited due to the pt's upper extremity disability, as well as vision disturbance.  Results suggested the presence of verbal memory difficulties and poor arithmetic skills. On the other hand, he was able to formulate basic reasoning and judgment responses, accurately. It appears that while the pt may be experiencing some measure of underlying organic cognitive difficulties, it is thought that the severe depression and anxiety are largely contributing to his cognitive problems.  Therefore, it is thought that at such time that his emotional status improves, so to will his cognitive ability.  Nevertheless, due to the combination of current emotional, cognitive, and premorbid limited intellectual capabilities, assistance with financial, medication, and other daily activities will be necessary.  Full report with recommendations to follow, soon.

## 2017-01-24 LAB
GLUCOSE BLDC GLUCOMTR-MCNC: 116 MG/DL (ref 70–130)
GLUCOSE BLDC GLUCOMTR-MCNC: 122 MG/DL (ref 70–130)
GLUCOSE BLDC GLUCOMTR-MCNC: 89 MG/DL (ref 70–130)
GLUCOSE BLDC GLUCOMTR-MCNC: 90 MG/DL (ref 70–130)

## 2017-01-24 PROCEDURE — 82962 GLUCOSE BLOOD TEST: CPT

## 2017-01-24 PROCEDURE — 94640 AIRWAY INHALATION TREATMENT: CPT

## 2017-01-24 PROCEDURE — 94799 UNLISTED PULMONARY SVC/PX: CPT

## 2017-01-24 RX ORDER — QUETIAPINE FUMARATE 50 MG/1
50 TABLET, FILM COATED ORAL 2 TIMES DAILY
Status: DISCONTINUED | OUTPATIENT
Start: 2017-01-24 | End: 2017-01-26

## 2017-01-24 RX ADMIN — DOCUSATE SODIUM 250 MG: 250 CAPSULE, LIQUID FILLED ORAL at 08:51

## 2017-01-24 RX ADMIN — CIPROFLOXACIN 250 MG: 250 TABLET, FILM COATED ORAL at 08:51

## 2017-01-24 RX ADMIN — BACLOFEN 20 MG: 20 TABLET ORAL at 08:51

## 2017-01-24 RX ADMIN — QUETIAPINE FUMARATE 50 MG: 50 TABLET, FILM COATED ORAL at 14:01

## 2017-01-24 RX ADMIN — TRIAMCINOLONE ACETONIDE 1 APPLICATION: 1 CREAM TOPICAL at 17:35

## 2017-01-24 RX ADMIN — LISINOPRIL 2.5 MG: 2.5 TABLET ORAL at 08:51

## 2017-01-24 RX ADMIN — CALCIUM CARBONATE-CHOLECALCIFEROL TAB 250 MG-125 UNIT 2 TABLET: 250-125 TAB at 21:27

## 2017-01-24 RX ADMIN — METFORMIN HYDROCHLORIDE 1000 MG: 500 TABLET, EXTENDED RELEASE ORAL at 17:35

## 2017-01-24 RX ADMIN — ALBUTEROL SULFATE 4 MG: 4 TABLET ORAL at 08:51

## 2017-01-24 RX ADMIN — PRAVASTATIN SODIUM 20 MG: 20 TABLET ORAL at 21:26

## 2017-01-24 RX ADMIN — TRIAMCINOLONE ACETONIDE 1 APPLICATION: 1 CREAM TOPICAL at 08:01

## 2017-01-24 RX ADMIN — OXYCODONE HYDROCHLORIDE AND ACETAMINOPHEN 500 MG: 500 TABLET ORAL at 08:51

## 2017-01-24 RX ADMIN — FLUTICASONE PROPIONATE 1 SPRAY: 50 SPRAY, METERED NASAL at 08:01

## 2017-01-24 RX ADMIN — PRAZOSIN HYDROCHLORIDE 1 MG: 1 CAPSULE ORAL at 08:51

## 2017-01-24 RX ADMIN — CIPROFLOXACIN 250 MG: 250 TABLET, FILM COATED ORAL at 21:27

## 2017-01-24 RX ADMIN — ALBUTEROL SULFATE 4 MG: 4 TABLET ORAL at 21:27

## 2017-01-24 RX ADMIN — IPRATROPIUM BROMIDE AND ALBUTEROL SULFATE 3 ML: .5; 3 SOLUTION RESPIRATORY (INHALATION) at 06:49

## 2017-01-24 RX ADMIN — IPRATROPIUM BROMIDE AND ALBUTEROL SULFATE 3 ML: .5; 3 SOLUTION RESPIRATORY (INHALATION) at 10:38

## 2017-01-24 RX ADMIN — METOPROLOL SUCCINATE 100 MG: 100 TABLET, FILM COATED, EXTENDED RELEASE ORAL at 08:50

## 2017-01-24 RX ADMIN — PRAZOSIN HYDROCHLORIDE 1 MG: 1 CAPSULE ORAL at 19:33

## 2017-01-24 RX ADMIN — ALBUTEROL SULFATE 4 MG: 4 TABLET ORAL at 16:21

## 2017-01-24 RX ADMIN — MAGNESIUM HYDROXIDE 10 ML: 2400 SUSPENSION ORAL at 09:00

## 2017-01-24 RX ADMIN — IPRATROPIUM BROMIDE AND ALBUTEROL SULFATE 3 ML: .5; 3 SOLUTION RESPIRATORY (INHALATION) at 20:00

## 2017-01-24 RX ADMIN — POTASSIUM CHLORIDE 20 MEQ: 750 CAPSULE, EXTENDED RELEASE ORAL at 08:51

## 2017-01-24 RX ADMIN — METFORMIN HYDROCHLORIDE 1000 MG: 500 TABLET, EXTENDED RELEASE ORAL at 08:50

## 2017-01-24 RX ADMIN — SERTRALINE 200 MG: 100 TABLET, FILM COATED ORAL at 08:51

## 2017-01-24 RX ADMIN — QUETIAPINE FUMARATE 50 MG: 50 TABLET, FILM COATED ORAL at 21:26

## 2017-01-24 RX ADMIN — BISACODYL 10 MG: 10 SUPPOSITORY RECTAL at 04:46

## 2017-01-24 RX ADMIN — CALCIUM CARBONATE-CHOLECALCIFEROL TAB 250 MG-125 UNIT 2 TABLET: 250-125 TAB at 08:50

## 2017-01-24 RX ADMIN — CETIRIZINE HYDROCHLORIDE 10 MG: 10 TABLET, FILM COATED ORAL at 08:51

## 2017-01-24 RX ADMIN — BACLOFEN 20 MG: 20 TABLET ORAL at 16:21

## 2017-01-24 RX ADMIN — LACTULOSE 15.33 G: 10 SOLUTION ORAL at 08:08

## 2017-01-24 RX ADMIN — BUPROPION HYDROCHLORIDE 450 MG: 300 TABLET, EXTENDED RELEASE ORAL at 08:01

## 2017-01-24 RX ADMIN — CALCIUM CARBONATE-CHOLECALCIFEROL TAB 250 MG-125 UNIT 2 TABLET: 250-125 TAB at 16:21

## 2017-01-24 RX ADMIN — BACLOFEN 20 MG: 20 TABLET ORAL at 21:27

## 2017-01-24 RX ADMIN — DOCUSATE SODIUM 250 MG: 250 CAPSULE, LIQUID FILLED ORAL at 17:35

## 2017-01-25 LAB
GLUCOSE BLDC GLUCOMTR-MCNC: 111 MG/DL (ref 70–130)
GLUCOSE BLDC GLUCOMTR-MCNC: 81 MG/DL (ref 70–130)
GLUCOSE BLDC GLUCOMTR-MCNC: 90 MG/DL (ref 70–130)

## 2017-01-25 PROCEDURE — 94799 UNLISTED PULMONARY SVC/PX: CPT

## 2017-01-25 PROCEDURE — 94640 AIRWAY INHALATION TREATMENT: CPT

## 2017-01-25 PROCEDURE — 82962 GLUCOSE BLOOD TEST: CPT

## 2017-01-25 RX ADMIN — TRIAMCINOLONE ACETONIDE 1 APPLICATION: 1 CREAM TOPICAL at 14:25

## 2017-01-25 RX ADMIN — ALBUTEROL SULFATE 4 MG: 4 TABLET ORAL at 20:36

## 2017-01-25 RX ADMIN — SERTRALINE 200 MG: 100 TABLET, FILM COATED ORAL at 09:01

## 2017-01-25 RX ADMIN — LISINOPRIL 2.5 MG: 2.5 TABLET ORAL at 09:00

## 2017-01-25 RX ADMIN — BACLOFEN 20 MG: 20 TABLET ORAL at 08:58

## 2017-01-25 RX ADMIN — CIPROFLOXACIN 250 MG: 250 TABLET, FILM COATED ORAL at 08:58

## 2017-01-25 RX ADMIN — DOCUSATE SODIUM 250 MG: 250 CAPSULE, LIQUID FILLED ORAL at 17:40

## 2017-01-25 RX ADMIN — QUETIAPINE FUMARATE 50 MG: 50 TABLET, FILM COATED ORAL at 20:37

## 2017-01-25 RX ADMIN — BACLOFEN 20 MG: 20 TABLET ORAL at 20:36

## 2017-01-25 RX ADMIN — METFORMIN HYDROCHLORIDE 1000 MG: 500 TABLET, EXTENDED RELEASE ORAL at 17:40

## 2017-01-25 RX ADMIN — LACTULOSE 15.33 G: 10 SOLUTION ORAL at 08:05

## 2017-01-25 RX ADMIN — CALCIUM CARBONATE-CHOLECALCIFEROL TAB 250 MG-125 UNIT 2 TABLET: 250-125 TAB at 16:22

## 2017-01-25 RX ADMIN — BACLOFEN 20 MG: 20 TABLET ORAL at 16:22

## 2017-01-25 RX ADMIN — PRAVASTATIN SODIUM 20 MG: 20 TABLET ORAL at 20:37

## 2017-01-25 RX ADMIN — FLUTICASONE PROPIONATE 1 SPRAY: 50 SPRAY, METERED NASAL at 14:25

## 2017-01-25 RX ADMIN — PRAZOSIN HYDROCHLORIDE 1 MG: 1 CAPSULE ORAL at 17:40

## 2017-01-25 RX ADMIN — BUPROPION HYDROCHLORIDE 450 MG: 300 TABLET, EXTENDED RELEASE ORAL at 07:58

## 2017-01-25 RX ADMIN — QUETIAPINE FUMARATE 50 MG: 50 TABLET, FILM COATED ORAL at 09:01

## 2017-01-25 RX ADMIN — TRIAMCINOLONE ACETONIDE 1 APPLICATION: 1 CREAM TOPICAL at 17:40

## 2017-01-25 RX ADMIN — OXYCODONE HYDROCHLORIDE AND ACETAMINOPHEN 500 MG: 500 TABLET ORAL at 09:01

## 2017-01-25 RX ADMIN — PRAZOSIN HYDROCHLORIDE 1 MG: 1 CAPSULE ORAL at 09:00

## 2017-01-25 RX ADMIN — CALCIUM CARBONATE-CHOLECALCIFEROL TAB 250 MG-125 UNIT 2 TABLET: 250-125 TAB at 20:37

## 2017-01-25 RX ADMIN — IPRATROPIUM BROMIDE AND ALBUTEROL SULFATE 3 ML: .5; 3 SOLUTION RESPIRATORY (INHALATION) at 19:20

## 2017-01-25 RX ADMIN — IPRATROPIUM BROMIDE AND ALBUTEROL SULFATE 3 ML: .5; 3 SOLUTION RESPIRATORY (INHALATION) at 15:00

## 2017-01-25 RX ADMIN — DOCUSATE SODIUM 250 MG: 250 CAPSULE, LIQUID FILLED ORAL at 09:01

## 2017-01-25 RX ADMIN — CETIRIZINE HYDROCHLORIDE 10 MG: 10 TABLET, FILM COATED ORAL at 09:01

## 2017-01-25 RX ADMIN — IPRATROPIUM BROMIDE AND ALBUTEROL SULFATE 3 ML: .5; 3 SOLUTION RESPIRATORY (INHALATION) at 10:27

## 2017-01-25 RX ADMIN — CIPROFLOXACIN 250 MG: 250 TABLET, FILM COATED ORAL at 20:36

## 2017-01-25 RX ADMIN — CALCIUM CARBONATE-CHOLECALCIFEROL TAB 250 MG-125 UNIT 2 TABLET: 250-125 TAB at 08:59

## 2017-01-25 RX ADMIN — IPRATROPIUM BROMIDE AND ALBUTEROL SULFATE 3 ML: .5; 3 SOLUTION RESPIRATORY (INHALATION) at 07:18

## 2017-01-25 RX ADMIN — METOPROLOL SUCCINATE 100 MG: 100 TABLET, FILM COATED, EXTENDED RELEASE ORAL at 08:59

## 2017-01-25 RX ADMIN — METFORMIN HYDROCHLORIDE 1000 MG: 500 TABLET, EXTENDED RELEASE ORAL at 09:00

## 2017-01-25 RX ADMIN — ALBUTEROL SULFATE 4 MG: 4 TABLET ORAL at 16:22

## 2017-01-25 RX ADMIN — POTASSIUM CHLORIDE 20 MEQ: 750 CAPSULE, EXTENDED RELEASE ORAL at 09:00

## 2017-01-25 RX ADMIN — ALBUTEROL SULFATE 4 MG: 4 TABLET ORAL at 08:58

## 2017-01-26 LAB
GLUCOSE BLDC GLUCOMTR-MCNC: 74 MG/DL (ref 70–130)
GLUCOSE BLDC GLUCOMTR-MCNC: 81 MG/DL (ref 70–130)
GLUCOSE BLDC GLUCOMTR-MCNC: 90 MG/DL (ref 70–130)
GLUCOSE BLDC GLUCOMTR-MCNC: 98 MG/DL (ref 70–130)

## 2017-01-26 PROCEDURE — 94640 AIRWAY INHALATION TREATMENT: CPT

## 2017-01-26 PROCEDURE — 82962 GLUCOSE BLOOD TEST: CPT

## 2017-01-26 PROCEDURE — 94799 UNLISTED PULMONARY SVC/PX: CPT

## 2017-01-26 RX ORDER — QUETIAPINE FUMARATE 50 MG/1
50 TABLET, FILM COATED ORAL 3 TIMES DAILY
Status: DISCONTINUED | OUTPATIENT
Start: 2017-01-26 | End: 2017-01-31 | Stop reason: HOSPADM

## 2017-01-26 RX ADMIN — METFORMIN HYDROCHLORIDE 1000 MG: 500 TABLET, EXTENDED RELEASE ORAL at 08:25

## 2017-01-26 RX ADMIN — ALBUTEROL SULFATE 4 MG: 4 TABLET ORAL at 20:49

## 2017-01-26 RX ADMIN — IPRATROPIUM BROMIDE AND ALBUTEROL SULFATE 3 ML: .5; 3 SOLUTION RESPIRATORY (INHALATION) at 15:02

## 2017-01-26 RX ADMIN — MAGNESIUM HYDROXIDE 10 ML: 2400 SUSPENSION ORAL at 08:40

## 2017-01-26 RX ADMIN — QUETIAPINE FUMARATE 50 MG: 50 TABLET, FILM COATED ORAL at 08:24

## 2017-01-26 RX ADMIN — LACTULOSE 15.33 G: 10 SOLUTION ORAL at 08:38

## 2017-01-26 RX ADMIN — PRAVASTATIN SODIUM 20 MG: 20 TABLET ORAL at 20:49

## 2017-01-26 RX ADMIN — BACLOFEN 20 MG: 20 TABLET ORAL at 15:21

## 2017-01-26 RX ADMIN — METFORMIN HYDROCHLORIDE 1000 MG: 500 TABLET, EXTENDED RELEASE ORAL at 17:34

## 2017-01-26 RX ADMIN — LISINOPRIL 2.5 MG: 2.5 TABLET ORAL at 08:25

## 2017-01-26 RX ADMIN — ALBUTEROL SULFATE 4 MG: 4 TABLET ORAL at 08:25

## 2017-01-26 RX ADMIN — FLUTICASONE PROPIONATE 1 SPRAY: 50 SPRAY, METERED NASAL at 08:42

## 2017-01-26 RX ADMIN — QUETIAPINE FUMARATE 50 MG: 50 TABLET, FILM COATED ORAL at 20:49

## 2017-01-26 RX ADMIN — BACLOFEN 20 MG: 20 TABLET ORAL at 08:26

## 2017-01-26 RX ADMIN — PRAZOSIN HYDROCHLORIDE 1 MG: 1 CAPSULE ORAL at 17:34

## 2017-01-26 RX ADMIN — POTASSIUM CHLORIDE 20 MEQ: 750 CAPSULE, EXTENDED RELEASE ORAL at 08:25

## 2017-01-26 RX ADMIN — CETIRIZINE HYDROCHLORIDE 10 MG: 10 TABLET, FILM COATED ORAL at 08:25

## 2017-01-26 RX ADMIN — DOCUSATE SODIUM 250 MG: 250 CAPSULE, LIQUID FILLED ORAL at 08:25

## 2017-01-26 RX ADMIN — CIPROFLOXACIN 250 MG: 250 TABLET, FILM COATED ORAL at 08:24

## 2017-01-26 RX ADMIN — TRIAMCINOLONE ACETONIDE 1 APPLICATION: 1 CREAM TOPICAL at 09:28

## 2017-01-26 RX ADMIN — BACLOFEN 20 MG: 20 TABLET ORAL at 20:49

## 2017-01-26 RX ADMIN — BUPROPION HYDROCHLORIDE 450 MG: 300 TABLET, EXTENDED RELEASE ORAL at 08:27

## 2017-01-26 RX ADMIN — CIPROFLOXACIN 250 MG: 250 TABLET, FILM COATED ORAL at 20:49

## 2017-01-26 RX ADMIN — IPRATROPIUM BROMIDE AND ALBUTEROL SULFATE 3 ML: .5; 3 SOLUTION RESPIRATORY (INHALATION) at 10:10

## 2017-01-26 RX ADMIN — METOPROLOL SUCCINATE 100 MG: 100 TABLET, FILM COATED, EXTENDED RELEASE ORAL at 08:24

## 2017-01-26 RX ADMIN — QUETIAPINE FUMARATE 50 MG: 50 TABLET, FILM COATED ORAL at 15:20

## 2017-01-26 RX ADMIN — TRIAMCINOLONE ACETONIDE 1 APPLICATION: 1 CREAM TOPICAL at 17:39

## 2017-01-26 RX ADMIN — CALCIUM CARBONATE-CHOLECALCIFEROL TAB 250 MG-125 UNIT 2 TABLET: 250-125 TAB at 08:25

## 2017-01-26 RX ADMIN — SERTRALINE 200 MG: 100 TABLET, FILM COATED ORAL at 08:24

## 2017-01-26 RX ADMIN — ALBUTEROL SULFATE 4 MG: 4 TABLET ORAL at 15:21

## 2017-01-26 RX ADMIN — CALCIUM CARBONATE-CHOLECALCIFEROL TAB 250 MG-125 UNIT 2 TABLET: 250-125 TAB at 20:49

## 2017-01-26 RX ADMIN — OXYCODONE HYDROCHLORIDE AND ACETAMINOPHEN 500 MG: 500 TABLET ORAL at 08:25

## 2017-01-26 RX ADMIN — IPRATROPIUM BROMIDE AND ALBUTEROL SULFATE 3 ML: .5; 3 SOLUTION RESPIRATORY (INHALATION) at 07:14

## 2017-01-26 RX ADMIN — DOCUSATE SODIUM 250 MG: 250 CAPSULE, LIQUID FILLED ORAL at 17:34

## 2017-01-26 RX ADMIN — CALCIUM CARBONATE-CHOLECALCIFEROL TAB 250 MG-125 UNIT 2 TABLET: 250-125 TAB at 15:21

## 2017-01-26 RX ADMIN — IPRATROPIUM BROMIDE AND ALBUTEROL SULFATE 3 ML: .5; 3 SOLUTION RESPIRATORY (INHALATION) at 20:33

## 2017-01-26 RX ADMIN — PRAZOSIN HYDROCHLORIDE 1 MG: 1 CAPSULE ORAL at 08:25

## 2017-01-27 LAB
GLUCOSE BLDC GLUCOMTR-MCNC: 105 MG/DL (ref 70–130)
GLUCOSE BLDC GLUCOMTR-MCNC: 120 MG/DL (ref 70–130)
GLUCOSE BLDC GLUCOMTR-MCNC: 91 MG/DL (ref 70–130)
GLUCOSE BLDC GLUCOMTR-MCNC: 95 MG/DL (ref 70–130)

## 2017-01-27 PROCEDURE — 94640 AIRWAY INHALATION TREATMENT: CPT

## 2017-01-27 PROCEDURE — 82962 GLUCOSE BLOOD TEST: CPT

## 2017-01-27 RX ADMIN — ALBUTEROL SULFATE 4 MG: 4 TABLET ORAL at 16:47

## 2017-01-27 RX ADMIN — ALBUTEROL SULFATE 4 MG: 4 TABLET ORAL at 08:49

## 2017-01-27 RX ADMIN — BACLOFEN 20 MG: 20 TABLET ORAL at 16:47

## 2017-01-27 RX ADMIN — LISINOPRIL 2.5 MG: 2.5 TABLET ORAL at 08:50

## 2017-01-27 RX ADMIN — BACLOFEN 20 MG: 20 TABLET ORAL at 08:49

## 2017-01-27 RX ADMIN — CALCIUM CARBONATE-CHOLECALCIFEROL TAB 250 MG-125 UNIT 2 TABLET: 250-125 TAB at 16:47

## 2017-01-27 RX ADMIN — IPRATROPIUM BROMIDE AND ALBUTEROL SULFATE 3 ML: .5; 3 SOLUTION RESPIRATORY (INHALATION) at 15:15

## 2017-01-27 RX ADMIN — POTASSIUM CHLORIDE 20 MEQ: 750 CAPSULE, EXTENDED RELEASE ORAL at 08:48

## 2017-01-27 RX ADMIN — QUETIAPINE FUMARATE 50 MG: 50 TABLET, FILM COATED ORAL at 16:47

## 2017-01-27 RX ADMIN — DOCUSATE SODIUM 250 MG: 250 CAPSULE, LIQUID FILLED ORAL at 18:12

## 2017-01-27 RX ADMIN — METFORMIN HYDROCHLORIDE 1000 MG: 500 TABLET, EXTENDED RELEASE ORAL at 08:49

## 2017-01-27 RX ADMIN — QUETIAPINE FUMARATE 50 MG: 50 TABLET, FILM COATED ORAL at 21:06

## 2017-01-27 RX ADMIN — IPRATROPIUM BROMIDE AND ALBUTEROL SULFATE 3 ML: .5; 3 SOLUTION RESPIRATORY (INHALATION) at 19:59

## 2017-01-27 RX ADMIN — BUPROPION HYDROCHLORIDE 450 MG: 300 TABLET, EXTENDED RELEASE ORAL at 08:49

## 2017-01-27 RX ADMIN — CETIRIZINE HYDROCHLORIDE 10 MG: 10 TABLET, FILM COATED ORAL at 08:50

## 2017-01-27 RX ADMIN — TRIAMCINOLONE ACETONIDE 1 APPLICATION: 1 CREAM TOPICAL at 18:12

## 2017-01-27 RX ADMIN — QUETIAPINE FUMARATE 50 MG: 50 TABLET, FILM COATED ORAL at 08:48

## 2017-01-27 RX ADMIN — SERTRALINE 200 MG: 100 TABLET, FILM COATED ORAL at 08:48

## 2017-01-27 RX ADMIN — PRAZOSIN HYDROCHLORIDE 1 MG: 1 CAPSULE ORAL at 18:12

## 2017-01-27 RX ADMIN — LACTULOSE 15.33 G: 10 SOLUTION ORAL at 08:50

## 2017-01-27 RX ADMIN — METFORMIN HYDROCHLORIDE 1000 MG: 500 TABLET, EXTENDED RELEASE ORAL at 18:12

## 2017-01-27 RX ADMIN — BACLOFEN 20 MG: 20 TABLET ORAL at 21:06

## 2017-01-27 RX ADMIN — CIPROFLOXACIN 250 MG: 250 TABLET, FILM COATED ORAL at 21:06

## 2017-01-27 RX ADMIN — IPRATROPIUM BROMIDE AND ALBUTEROL SULFATE 3 ML: .5; 3 SOLUTION RESPIRATORY (INHALATION) at 07:30

## 2017-01-27 RX ADMIN — CALCIUM CARBONATE-CHOLECALCIFEROL TAB 250 MG-125 UNIT 2 TABLET: 250-125 TAB at 21:06

## 2017-01-27 RX ADMIN — ALBUTEROL SULFATE 4 MG: 4 TABLET ORAL at 21:06

## 2017-01-27 RX ADMIN — FLUTICASONE PROPIONATE 1 SPRAY: 50 SPRAY, METERED NASAL at 08:53

## 2017-01-27 RX ADMIN — DOCUSATE SODIUM 250 MG: 250 CAPSULE, LIQUID FILLED ORAL at 08:50

## 2017-01-27 RX ADMIN — PRAZOSIN HYDROCHLORIDE 1 MG: 1 CAPSULE ORAL at 09:06

## 2017-01-27 RX ADMIN — CALCIUM CARBONATE-CHOLECALCIFEROL TAB 250 MG-125 UNIT 2 TABLET: 250-125 TAB at 08:48

## 2017-01-27 RX ADMIN — IPRATROPIUM BROMIDE AND ALBUTEROL SULFATE 3 ML: .5; 3 SOLUTION RESPIRATORY (INHALATION) at 10:50

## 2017-01-27 RX ADMIN — OXYCODONE HYDROCHLORIDE AND ACETAMINOPHEN 500 MG: 500 TABLET ORAL at 08:48

## 2017-01-27 RX ADMIN — PRAVASTATIN SODIUM 20 MG: 20 TABLET ORAL at 21:06

## 2017-01-27 RX ADMIN — TRIAMCINOLONE ACETONIDE 1 APPLICATION: 1 CREAM TOPICAL at 09:00

## 2017-01-27 RX ADMIN — METOPROLOL SUCCINATE 100 MG: 100 TABLET, FILM COATED, EXTENDED RELEASE ORAL at 08:49

## 2017-01-27 RX ADMIN — CIPROFLOXACIN 250 MG: 250 TABLET, FILM COATED ORAL at 08:50

## 2017-01-28 LAB
GLUCOSE BLDC GLUCOMTR-MCNC: 112 MG/DL (ref 70–130)
GLUCOSE BLDC GLUCOMTR-MCNC: 123 MG/DL (ref 70–130)
GLUCOSE BLDC GLUCOMTR-MCNC: 151 MG/DL (ref 70–130)
GLUCOSE BLDC GLUCOMTR-MCNC: 89 MG/DL (ref 70–130)

## 2017-01-28 PROCEDURE — 94640 AIRWAY INHALATION TREATMENT: CPT

## 2017-01-28 PROCEDURE — 82962 GLUCOSE BLOOD TEST: CPT

## 2017-01-28 RX ADMIN — BACLOFEN 20 MG: 20 TABLET ORAL at 20:14

## 2017-01-28 RX ADMIN — LACTULOSE 15.33 G: 10 SOLUTION ORAL at 08:37

## 2017-01-28 RX ADMIN — METFORMIN HYDROCHLORIDE 1000 MG: 500 TABLET, EXTENDED RELEASE ORAL at 08:24

## 2017-01-28 RX ADMIN — METOPROLOL SUCCINATE 100 MG: 100 TABLET, FILM COATED, EXTENDED RELEASE ORAL at 08:25

## 2017-01-28 RX ADMIN — CIPROFLOXACIN 250 MG: 250 TABLET, FILM COATED ORAL at 20:14

## 2017-01-28 RX ADMIN — TRIAMCINOLONE ACETONIDE 1 APPLICATION: 1 CREAM TOPICAL at 10:15

## 2017-01-28 RX ADMIN — CALCIUM CARBONATE-CHOLECALCIFEROL TAB 250 MG-125 UNIT 2 TABLET: 250-125 TAB at 20:14

## 2017-01-28 RX ADMIN — ALBUTEROL SULFATE 4 MG: 4 TABLET ORAL at 08:36

## 2017-01-28 RX ADMIN — PRAZOSIN HYDROCHLORIDE 1 MG: 1 CAPSULE ORAL at 08:25

## 2017-01-28 RX ADMIN — IPRATROPIUM BROMIDE AND ALBUTEROL SULFATE 3 ML: .5; 3 SOLUTION RESPIRATORY (INHALATION) at 07:16

## 2017-01-28 RX ADMIN — POTASSIUM CHLORIDE 20 MEQ: 750 CAPSULE, EXTENDED RELEASE ORAL at 08:24

## 2017-01-28 RX ADMIN — CALCIUM CARBONATE-CHOLECALCIFEROL TAB 250 MG-125 UNIT 2 TABLET: 250-125 TAB at 15:25

## 2017-01-28 RX ADMIN — BACLOFEN 20 MG: 20 TABLET ORAL at 15:25

## 2017-01-28 RX ADMIN — FLUTICASONE PROPIONATE 1 SPRAY: 50 SPRAY, METERED NASAL at 08:41

## 2017-01-28 RX ADMIN — OXYCODONE HYDROCHLORIDE AND ACETAMINOPHEN 500 MG: 500 TABLET ORAL at 08:25

## 2017-01-28 RX ADMIN — BACLOFEN 20 MG: 20 TABLET ORAL at 08:24

## 2017-01-28 RX ADMIN — QUETIAPINE FUMARATE 50 MG: 50 TABLET, FILM COATED ORAL at 15:25

## 2017-01-28 RX ADMIN — BUPROPION HYDROCHLORIDE 450 MG: 300 TABLET, EXTENDED RELEASE ORAL at 08:24

## 2017-01-28 RX ADMIN — SERTRALINE 200 MG: 100 TABLET, FILM COATED ORAL at 08:25

## 2017-01-28 RX ADMIN — CETIRIZINE HYDROCHLORIDE 10 MG: 10 TABLET, FILM COATED ORAL at 08:25

## 2017-01-28 RX ADMIN — METFORMIN HYDROCHLORIDE 1000 MG: 500 TABLET, EXTENDED RELEASE ORAL at 17:36

## 2017-01-28 RX ADMIN — ALBUTEROL SULFATE 4 MG: 4 TABLET ORAL at 15:25

## 2017-01-28 RX ADMIN — CALCIUM CARBONATE-CHOLECALCIFEROL TAB 250 MG-125 UNIT 2 TABLET: 250-125 TAB at 08:24

## 2017-01-28 RX ADMIN — PRAVASTATIN SODIUM 20 MG: 20 TABLET ORAL at 20:14

## 2017-01-28 RX ADMIN — MAGNESIUM HYDROXIDE 10 ML: 2400 SUSPENSION ORAL at 08:37

## 2017-01-28 RX ADMIN — QUETIAPINE FUMARATE 50 MG: 50 TABLET, FILM COATED ORAL at 20:14

## 2017-01-28 RX ADMIN — TRIAMCINOLONE ACETONIDE 1 APPLICATION: 1 CREAM TOPICAL at 20:14

## 2017-01-28 RX ADMIN — DOCUSATE SODIUM 250 MG: 250 CAPSULE, LIQUID FILLED ORAL at 17:36

## 2017-01-28 RX ADMIN — CIPROFLOXACIN 250 MG: 250 TABLET, FILM COATED ORAL at 08:25

## 2017-01-28 RX ADMIN — QUETIAPINE FUMARATE 50 MG: 50 TABLET, FILM COATED ORAL at 08:25

## 2017-01-28 RX ADMIN — IPRATROPIUM BROMIDE AND ALBUTEROL SULFATE 3 ML: .5; 3 SOLUTION RESPIRATORY (INHALATION) at 14:49

## 2017-01-28 RX ADMIN — LISINOPRIL 2.5 MG: 2.5 TABLET ORAL at 08:24

## 2017-01-28 RX ADMIN — IPRATROPIUM BROMIDE AND ALBUTEROL SULFATE 3 ML: .5; 3 SOLUTION RESPIRATORY (INHALATION) at 20:55

## 2017-01-28 RX ADMIN — DOCUSATE SODIUM 250 MG: 250 CAPSULE, LIQUID FILLED ORAL at 08:24

## 2017-01-28 RX ADMIN — PRAZOSIN HYDROCHLORIDE 1 MG: 1 CAPSULE ORAL at 17:36

## 2017-01-29 LAB
GLUCOSE BLDC GLUCOMTR-MCNC: 88 MG/DL (ref 70–130)
GLUCOSE BLDC GLUCOMTR-MCNC: 90 MG/DL (ref 70–130)
GLUCOSE BLDC GLUCOMTR-MCNC: 93 MG/DL (ref 70–130)
GLUCOSE BLDC GLUCOMTR-MCNC: 98 MG/DL (ref 70–130)

## 2017-01-29 PROCEDURE — 82962 GLUCOSE BLOOD TEST: CPT

## 2017-01-29 PROCEDURE — 94640 AIRWAY INHALATION TREATMENT: CPT

## 2017-01-29 PROCEDURE — 94799 UNLISTED PULMONARY SVC/PX: CPT

## 2017-01-29 RX ADMIN — LISINOPRIL 2.5 MG: 2.5 TABLET ORAL at 09:42

## 2017-01-29 RX ADMIN — METFORMIN HYDROCHLORIDE 1000 MG: 500 TABLET, EXTENDED RELEASE ORAL at 09:42

## 2017-01-29 RX ADMIN — QUETIAPINE FUMARATE 50 MG: 50 TABLET, FILM COATED ORAL at 15:55

## 2017-01-29 RX ADMIN — SERTRALINE 200 MG: 100 TABLET, FILM COATED ORAL at 09:43

## 2017-01-29 RX ADMIN — POTASSIUM CHLORIDE 20 MEQ: 750 CAPSULE, EXTENDED RELEASE ORAL at 09:42

## 2017-01-29 RX ADMIN — METOPROLOL SUCCINATE 100 MG: 100 TABLET, FILM COATED, EXTENDED RELEASE ORAL at 09:43

## 2017-01-29 RX ADMIN — PRAZOSIN HYDROCHLORIDE 1 MG: 1 CAPSULE ORAL at 09:42

## 2017-01-29 RX ADMIN — PRAZOSIN HYDROCHLORIDE 1 MG: 1 CAPSULE ORAL at 17:38

## 2017-01-29 RX ADMIN — ALBUTEROL SULFATE 4 MG: 4 TABLET ORAL at 00:02

## 2017-01-29 RX ADMIN — OXYCODONE HYDROCHLORIDE AND ACETAMINOPHEN 500 MG: 500 TABLET ORAL at 09:42

## 2017-01-29 RX ADMIN — TRIAMCINOLONE ACETONIDE 1 APPLICATION: 1 CREAM TOPICAL at 10:18

## 2017-01-29 RX ADMIN — PRAVASTATIN SODIUM 20 MG: 20 TABLET ORAL at 20:29

## 2017-01-29 RX ADMIN — CIPROFLOXACIN 250 MG: 250 TABLET, FILM COATED ORAL at 09:43

## 2017-01-29 RX ADMIN — IPRATROPIUM BROMIDE AND ALBUTEROL SULFATE 3 ML: .5; 3 SOLUTION RESPIRATORY (INHALATION) at 10:50

## 2017-01-29 RX ADMIN — IPRATROPIUM BROMIDE AND ALBUTEROL SULFATE 3 ML: .5; 3 SOLUTION RESPIRATORY (INHALATION) at 07:34

## 2017-01-29 RX ADMIN — CALCIUM CARBONATE-CHOLECALCIFEROL TAB 250 MG-125 UNIT 2 TABLET: 250-125 TAB at 15:55

## 2017-01-29 RX ADMIN — DOCUSATE SODIUM 250 MG: 250 CAPSULE, LIQUID FILLED ORAL at 17:39

## 2017-01-29 RX ADMIN — FLUTICASONE PROPIONATE 1 SPRAY: 50 SPRAY, METERED NASAL at 09:52

## 2017-01-29 RX ADMIN — ALBUTEROL SULFATE 4 MG: 4 TABLET ORAL at 09:43

## 2017-01-29 RX ADMIN — IPRATROPIUM BROMIDE AND ALBUTEROL SULFATE 3 ML: .5; 3 SOLUTION RESPIRATORY (INHALATION) at 15:25

## 2017-01-29 RX ADMIN — ALBUTEROL SULFATE 4 MG: 4 TABLET ORAL at 20:29

## 2017-01-29 RX ADMIN — CALCIUM CARBONATE-CHOLECALCIFEROL TAB 250 MG-125 UNIT 2 TABLET: 250-125 TAB at 09:42

## 2017-01-29 RX ADMIN — CIPROFLOXACIN 250 MG: 250 TABLET, FILM COATED ORAL at 20:29

## 2017-01-29 RX ADMIN — ALBUTEROL SULFATE 4 MG: 4 TABLET ORAL at 15:55

## 2017-01-29 RX ADMIN — LACTULOSE 15.33 G: 10 SOLUTION ORAL at 09:52

## 2017-01-29 RX ADMIN — BUPROPION HYDROCHLORIDE 450 MG: 300 TABLET, EXTENDED RELEASE ORAL at 07:00

## 2017-01-29 RX ADMIN — QUETIAPINE FUMARATE 50 MG: 50 TABLET, FILM COATED ORAL at 09:43

## 2017-01-29 RX ADMIN — CETIRIZINE HYDROCHLORIDE 10 MG: 10 TABLET, FILM COATED ORAL at 09:42

## 2017-01-29 RX ADMIN — DOCUSATE SODIUM 250 MG: 250 CAPSULE, LIQUID FILLED ORAL at 09:42

## 2017-01-29 RX ADMIN — CALCIUM CARBONATE-CHOLECALCIFEROL TAB 250 MG-125 UNIT 2 TABLET: 250-125 TAB at 20:30

## 2017-01-29 RX ADMIN — IPRATROPIUM BROMIDE AND ALBUTEROL SULFATE 3 ML: .5; 3 SOLUTION RESPIRATORY (INHALATION) at 19:21

## 2017-01-29 RX ADMIN — QUETIAPINE FUMARATE 50 MG: 50 TABLET, FILM COATED ORAL at 20:29

## 2017-01-29 RX ADMIN — BACLOFEN 20 MG: 20 TABLET ORAL at 09:43

## 2017-01-29 RX ADMIN — TRIAMCINOLONE ACETONIDE 1 APPLICATION: 1 CREAM TOPICAL at 22:18

## 2017-01-29 RX ADMIN — METFORMIN HYDROCHLORIDE 1000 MG: 500 TABLET, EXTENDED RELEASE ORAL at 17:39

## 2017-01-29 RX ADMIN — BACLOFEN 20 MG: 20 TABLET ORAL at 15:55

## 2017-01-29 RX ADMIN — BACLOFEN 20 MG: 20 TABLET ORAL at 20:29

## 2017-01-30 LAB
GLUCOSE BLDC GLUCOMTR-MCNC: 139 MG/DL (ref 70–130)
GLUCOSE BLDC GLUCOMTR-MCNC: 82 MG/DL (ref 70–130)
GLUCOSE BLDC GLUCOMTR-MCNC: 92 MG/DL (ref 70–130)
GLUCOSE BLDC GLUCOMTR-MCNC: 95 MG/DL (ref 70–130)

## 2017-01-30 PROCEDURE — 94640 AIRWAY INHALATION TREATMENT: CPT

## 2017-01-30 PROCEDURE — 82962 GLUCOSE BLOOD TEST: CPT

## 2017-01-30 RX ADMIN — ALBUTEROL SULFATE 4 MG: 4 TABLET ORAL at 21:27

## 2017-01-30 RX ADMIN — PRAVASTATIN SODIUM 20 MG: 20 TABLET ORAL at 21:27

## 2017-01-30 RX ADMIN — METOPROLOL SUCCINATE 100 MG: 100 TABLET, FILM COATED, EXTENDED RELEASE ORAL at 09:05

## 2017-01-30 RX ADMIN — QUETIAPINE FUMARATE 50 MG: 50 TABLET, FILM COATED ORAL at 09:05

## 2017-01-30 RX ADMIN — FLUTICASONE PROPIONATE 1 SPRAY: 50 SPRAY, METERED NASAL at 09:06

## 2017-01-30 RX ADMIN — TRIAMCINOLONE ACETONIDE 1 APPLICATION: 1 CREAM TOPICAL at 09:06

## 2017-01-30 RX ADMIN — CETIRIZINE HYDROCHLORIDE 10 MG: 10 TABLET, FILM COATED ORAL at 09:05

## 2017-01-30 RX ADMIN — LACTULOSE 15.33 G: 10 SOLUTION ORAL at 09:06

## 2017-01-30 RX ADMIN — ALBUTEROL SULFATE 4 MG: 4 TABLET ORAL at 09:06

## 2017-01-30 RX ADMIN — PRAZOSIN HYDROCHLORIDE 1 MG: 1 CAPSULE ORAL at 17:42

## 2017-01-30 RX ADMIN — MAGNESIUM HYDROXIDE 10 ML: 2400 SUSPENSION ORAL at 09:06

## 2017-01-30 RX ADMIN — IPRATROPIUM BROMIDE AND ALBUTEROL SULFATE 3 ML: .5; 3 SOLUTION RESPIRATORY (INHALATION) at 22:03

## 2017-01-30 RX ADMIN — OXYCODONE HYDROCHLORIDE AND ACETAMINOPHEN 500 MG: 500 TABLET ORAL at 09:05

## 2017-01-30 RX ADMIN — CALCIUM CARBONATE-CHOLECALCIFEROL TAB 250 MG-125 UNIT 2 TABLET: 250-125 TAB at 15:50

## 2017-01-30 RX ADMIN — IPRATROPIUM BROMIDE AND ALBUTEROL SULFATE 3 ML: .5; 3 SOLUTION RESPIRATORY (INHALATION) at 15:55

## 2017-01-30 RX ADMIN — SERTRALINE 200 MG: 100 TABLET, FILM COATED ORAL at 09:05

## 2017-01-30 RX ADMIN — CALCIUM CARBONATE-CHOLECALCIFEROL TAB 250 MG-125 UNIT 2 TABLET: 250-125 TAB at 09:06

## 2017-01-30 RX ADMIN — QUETIAPINE FUMARATE 50 MG: 50 TABLET, FILM COATED ORAL at 21:28

## 2017-01-30 RX ADMIN — TRIAMCINOLONE ACETONIDE 1 APPLICATION: 1 CREAM TOPICAL at 21:28

## 2017-01-30 RX ADMIN — METFORMIN HYDROCHLORIDE 1000 MG: 500 TABLET, EXTENDED RELEASE ORAL at 09:05

## 2017-01-30 RX ADMIN — BACLOFEN 20 MG: 20 TABLET ORAL at 09:06

## 2017-01-30 RX ADMIN — LISINOPRIL 2.5 MG: 2.5 TABLET ORAL at 09:05

## 2017-01-30 RX ADMIN — PRAZOSIN HYDROCHLORIDE 1 MG: 1 CAPSULE ORAL at 09:05

## 2017-01-30 RX ADMIN — BACLOFEN 20 MG: 20 TABLET ORAL at 21:28

## 2017-01-30 RX ADMIN — POTASSIUM CHLORIDE 20 MEQ: 750 CAPSULE, EXTENDED RELEASE ORAL at 09:05

## 2017-01-30 RX ADMIN — DOCUSATE SODIUM 250 MG: 250 CAPSULE, LIQUID FILLED ORAL at 09:06

## 2017-01-30 RX ADMIN — BUPROPION HYDROCHLORIDE 450 MG: 300 TABLET, EXTENDED RELEASE ORAL at 09:14

## 2017-01-30 RX ADMIN — CIPROFLOXACIN 250 MG: 250 TABLET, FILM COATED ORAL at 21:28

## 2017-01-30 RX ADMIN — BACLOFEN 20 MG: 20 TABLET ORAL at 15:50

## 2017-01-30 RX ADMIN — QUETIAPINE FUMARATE 50 MG: 50 TABLET, FILM COATED ORAL at 15:50

## 2017-01-30 RX ADMIN — DOCUSATE SODIUM 250 MG: 250 CAPSULE, LIQUID FILLED ORAL at 21:28

## 2017-01-30 RX ADMIN — ALBUTEROL SULFATE 4 MG: 4 TABLET ORAL at 15:50

## 2017-01-30 RX ADMIN — CIPROFLOXACIN 250 MG: 250 TABLET, FILM COATED ORAL at 09:05

## 2017-01-30 RX ADMIN — METFORMIN HYDROCHLORIDE 1000 MG: 500 TABLET, EXTENDED RELEASE ORAL at 17:42

## 2017-01-30 RX ADMIN — IPRATROPIUM BROMIDE AND ALBUTEROL SULFATE 3 ML: .5; 3 SOLUTION RESPIRATORY (INHALATION) at 08:21

## 2017-01-30 RX ADMIN — CALCIUM CARBONATE-CHOLECALCIFEROL TAB 250 MG-125 UNIT 2 TABLET: 250-125 TAB at 21:28

## 2017-01-31 VITALS
OXYGEN SATURATION: 96 % | HEART RATE: 72 BPM | DIASTOLIC BLOOD PRESSURE: 67 MMHG | TEMPERATURE: 98.6 F | RESPIRATION RATE: 18 BRPM | HEIGHT: 69 IN | SYSTOLIC BLOOD PRESSURE: 132 MMHG

## 2017-01-31 LAB — GLUCOSE BLDC GLUCOMTR-MCNC: 88 MG/DL (ref 70–130)

## 2017-01-31 PROCEDURE — 82962 GLUCOSE BLOOD TEST: CPT

## 2017-01-31 PROCEDURE — 94799 UNLISTED PULMONARY SVC/PX: CPT

## 2017-01-31 PROCEDURE — 94640 AIRWAY INHALATION TREATMENT: CPT

## 2017-01-31 RX ORDER — QUETIAPINE FUMARATE 50 MG/1
50 TABLET, FILM COATED ORAL 3 TIMES DAILY
Qty: 90 TABLET | Refills: 0 | Status: ON HOLD | OUTPATIENT
Start: 2017-01-31 | End: 2021-11-06

## 2017-01-31 RX ORDER — BUPROPION HYDROCHLORIDE 450 MG/1
450 TABLET, FILM COATED, EXTENDED RELEASE ORAL EVERY MORNING
Qty: 30 TABLET | Refills: 0 | Status: ON HOLD | OUTPATIENT
Start: 2017-01-31 | End: 2021-11-08

## 2017-01-31 RX ORDER — SERTRALINE HYDROCHLORIDE 100 MG/1
100 TABLET, FILM COATED ORAL DAILY
Qty: 60 TABLET | Refills: 0 | Status: ON HOLD | OUTPATIENT
Start: 2017-01-31 | End: 2021-11-06

## 2017-01-31 RX ADMIN — BUPROPION HYDROCHLORIDE 450 MG: 300 TABLET, EXTENDED RELEASE ORAL at 08:52

## 2017-01-31 RX ADMIN — CIPROFLOXACIN 250 MG: 250 TABLET, FILM COATED ORAL at 08:52

## 2017-01-31 RX ADMIN — ALBUTEROL SULFATE 4 MG: 4 TABLET ORAL at 08:53

## 2017-01-31 RX ADMIN — IPRATROPIUM BROMIDE AND ALBUTEROL SULFATE 3 ML: .5; 3 SOLUTION RESPIRATORY (INHALATION) at 06:40

## 2017-01-31 RX ADMIN — LISINOPRIL 2.5 MG: 2.5 TABLET ORAL at 08:52

## 2017-01-31 RX ADMIN — DOCUSATE SODIUM 250 MG: 250 CAPSULE, LIQUID FILLED ORAL at 08:52

## 2017-01-31 RX ADMIN — TRIAMCINOLONE ACETONIDE 1 APPLICATION: 1 CREAM TOPICAL at 08:53

## 2017-01-31 RX ADMIN — BACLOFEN 20 MG: 20 TABLET ORAL at 08:52

## 2017-01-31 RX ADMIN — QUETIAPINE FUMARATE 50 MG: 50 TABLET, FILM COATED ORAL at 08:51

## 2017-01-31 RX ADMIN — OXYCODONE HYDROCHLORIDE AND ACETAMINOPHEN 500 MG: 500 TABLET ORAL at 08:51

## 2017-01-31 RX ADMIN — FLUTICASONE PROPIONATE 1 SPRAY: 50 SPRAY, METERED NASAL at 08:53

## 2017-01-31 RX ADMIN — METOPROLOL SUCCINATE 100 MG: 100 TABLET, FILM COATED, EXTENDED RELEASE ORAL at 08:51

## 2017-01-31 RX ADMIN — LACTULOSE 15.33 G: 10 SOLUTION ORAL at 08:54

## 2017-01-31 RX ADMIN — POTASSIUM CHLORIDE 20 MEQ: 750 CAPSULE, EXTENDED RELEASE ORAL at 08:52

## 2017-01-31 RX ADMIN — PRAZOSIN HYDROCHLORIDE 1 MG: 1 CAPSULE ORAL at 08:52

## 2017-01-31 RX ADMIN — METFORMIN HYDROCHLORIDE 1000 MG: 500 TABLET, EXTENDED RELEASE ORAL at 08:52

## 2017-01-31 RX ADMIN — CETIRIZINE HYDROCHLORIDE 10 MG: 10 TABLET, FILM COATED ORAL at 08:52

## 2017-01-31 RX ADMIN — IPRATROPIUM BROMIDE AND ALBUTEROL SULFATE 3 ML: .5; 3 SOLUTION RESPIRATORY (INHALATION) at 10:27

## 2017-01-31 RX ADMIN — SERTRALINE 200 MG: 100 TABLET, FILM COATED ORAL at 08:51

## 2017-01-31 RX ADMIN — CALCIUM CARBONATE-CHOLECALCIFEROL TAB 250 MG-125 UNIT 2 TABLET: 250-125 TAB at 08:52

## 2017-02-06 ENCOUNTER — OFFICE VISIT (OUTPATIENT)
Dept: PSYCHIATRY | Facility: HOSPITAL | Age: 77
End: 2017-02-06

## 2017-02-06 DIAGNOSIS — G80.9 CEREBRAL PALSY, UNSPECIFIED TYPE (HCC): ICD-10-CM

## 2017-02-06 DIAGNOSIS — R45.851 DEPRESSION WITH SUICIDAL IDEATION: ICD-10-CM

## 2017-02-06 DIAGNOSIS — F32.A DEPRESSION WITH SUICIDAL IDEATION: ICD-10-CM

## 2017-02-06 PROCEDURE — 90791 PSYCH DIAGNOSTIC EVALUATION: CPT | Performed by: SOCIAL WORKER

## 2018-06-15 ENCOUNTER — LAB REQUISITION (OUTPATIENT)
Dept: LAB | Facility: HOSPITAL | Age: 78
End: 2018-06-15

## 2018-06-15 DIAGNOSIS — Z00.00 ROUTINE GENERAL MEDICAL EXAMINATION AT A HEALTH CARE FACILITY: ICD-10-CM

## 2018-06-15 LAB — VANCOMYCIN TROUGH SERPL-MCNC: 9.7 MCG/ML (ref 5–20)

## 2018-06-15 PROCEDURE — 80202 ASSAY OF VANCOMYCIN: CPT

## 2018-06-24 ENCOUNTER — LAB REQUISITION (OUTPATIENT)
Dept: LAB | Facility: HOSPITAL | Age: 78
End: 2018-06-24

## 2018-06-24 DIAGNOSIS — Z00.00 ROUTINE GENERAL MEDICAL EXAMINATION AT A HEALTH CARE FACILITY: ICD-10-CM

## 2018-06-24 LAB — VANCOMYCIN TROUGH SERPL-MCNC: 23.2 MCG/ML (ref 5–20)

## 2018-06-24 PROCEDURE — 80202 ASSAY OF VANCOMYCIN: CPT

## 2018-12-08 ENCOUNTER — LAB REQUISITION (OUTPATIENT)
Dept: LAB | Facility: HOSPITAL | Age: 78
End: 2018-12-08

## 2018-12-08 DIAGNOSIS — Z00.00 ROUTINE GENERAL MEDICAL EXAMINATION AT A HEALTH CARE FACILITY: ICD-10-CM

## 2018-12-08 LAB
BACTERIA UR QL AUTO: ABNORMAL /HPF
BILIRUB UR QL STRIP: NEGATIVE
CLARITY UR: ABNORMAL
COLOR UR: YELLOW
GLUCOSE UR STRIP-MCNC: NEGATIVE MG/DL
HGB UR QL STRIP.AUTO: ABNORMAL
HYALINE CASTS UR QL AUTO: ABNORMAL /LPF
KETONES UR QL STRIP: NEGATIVE
LEUKOCYTE ESTERASE UR QL STRIP.AUTO: ABNORMAL
NITRITE UR QL STRIP: NEGATIVE
PH UR STRIP.AUTO: <=5 [PH] (ref 5–8)
PROT UR QL STRIP: ABNORMAL
RBC # UR: ABNORMAL /HPF
REF LAB TEST METHOD: ABNORMAL
SP GR UR STRIP: 1.02 (ref 1–1.03)
SQUAMOUS #/AREA URNS HPF: ABNORMAL /HPF
UROBILINOGEN UR QL STRIP: ABNORMAL
WBC UR QL AUTO: ABNORMAL /HPF

## 2018-12-08 PROCEDURE — 81001 URINALYSIS AUTO W/SCOPE: CPT

## 2021-11-06 ENCOUNTER — HOSPITAL ENCOUNTER (INPATIENT)
Facility: HOSPITAL | Age: 81
LOS: 4 days | Discharge: NURSING, REHAB OR LONG-TERM CARE | End: 2021-11-10
Attending: EMERGENCY MEDICINE | Admitting: HOSPITALIST

## 2021-11-06 ENCOUNTER — APPOINTMENT (OUTPATIENT)
Dept: CT IMAGING | Facility: HOSPITAL | Age: 81
End: 2021-11-06

## 2021-11-06 DIAGNOSIS — N39.0 ACUTE UTI: Primary | ICD-10-CM

## 2021-11-06 DIAGNOSIS — R10.84 GENERALIZED ABDOMINAL PAIN: ICD-10-CM

## 2021-11-06 LAB
ALBUMIN SERPL-MCNC: 3.6 G/DL (ref 3.5–5.2)
ALBUMIN/GLOB SERPL: 1 G/DL
ALP SERPL-CCNC: 98 U/L (ref 39–117)
ALT SERPL W P-5'-P-CCNC: 7 U/L (ref 1–41)
ANION GAP SERPL CALCULATED.3IONS-SCNC: 10.6 MMOL/L (ref 5–15)
AST SERPL-CCNC: 9 U/L (ref 1–40)
BACTERIA UR QL AUTO: ABNORMAL /HPF
BASOPHILS # BLD AUTO: 0.06 10*3/MM3 (ref 0–0.2)
BASOPHILS NFR BLD AUTO: 0.5 % (ref 0–1.5)
BILIRUB SERPL-MCNC: 0.5 MG/DL (ref 0–1.2)
BILIRUB UR QL STRIP: NEGATIVE
BUN SERPL-MCNC: 15 MG/DL (ref 8–23)
BUN/CREAT SERPL: 28.8 (ref 7–25)
CALCIUM SPEC-SCNC: 9 MG/DL (ref 8.6–10.5)
CHLORIDE SERPL-SCNC: 104 MMOL/L (ref 98–107)
CLARITY UR: ABNORMAL
CO2 SERPL-SCNC: 26.4 MMOL/L (ref 22–29)
COLOR UR: YELLOW
CREAT SERPL-MCNC: 0.52 MG/DL (ref 0.76–1.27)
D-LACTATE SERPL-SCNC: 1.5 MMOL/L (ref 0.5–2)
DEPRECATED RDW RBC AUTO: 42.2 FL (ref 37–54)
EOSINOPHIL # BLD AUTO: 0.83 10*3/MM3 (ref 0–0.4)
EOSINOPHIL NFR BLD AUTO: 7.1 % (ref 0.3–6.2)
ERYTHROCYTE [DISTWIDTH] IN BLOOD BY AUTOMATED COUNT: 13.3 % (ref 12.3–15.4)
GFR SERPL CREATININE-BSD FRML MDRD: >150 ML/MIN/1.73
GLOBULIN UR ELPH-MCNC: 3.6 GM/DL
GLUCOSE SERPL-MCNC: 154 MG/DL (ref 65–99)
GLUCOSE UR STRIP-MCNC: NEGATIVE MG/DL
HCT VFR BLD AUTO: 41.9 % (ref 37.5–51)
HGB BLD-MCNC: 14.2 G/DL (ref 13–17.7)
HGB UR QL STRIP.AUTO: ABNORMAL
HYALINE CASTS UR QL AUTO: ABNORMAL /LPF
IMM GRANULOCYTES # BLD AUTO: 0.05 10*3/MM3 (ref 0–0.05)
IMM GRANULOCYTES NFR BLD AUTO: 0.4 % (ref 0–0.5)
KETONES UR QL STRIP: NEGATIVE
LEUKOCYTE ESTERASE UR QL STRIP.AUTO: ABNORMAL
LYMPHOCYTES # BLD AUTO: 1.2 10*3/MM3 (ref 0.7–3.1)
LYMPHOCYTES NFR BLD AUTO: 10.3 % (ref 19.6–45.3)
MCH RBC QN AUTO: 29.4 PG (ref 26.6–33)
MCHC RBC AUTO-ENTMCNC: 33.9 G/DL (ref 31.5–35.7)
MCV RBC AUTO: 86.7 FL (ref 79–97)
MONOCYTES # BLD AUTO: 1.06 10*3/MM3 (ref 0.1–0.9)
MONOCYTES NFR BLD AUTO: 9.1 % (ref 5–12)
NEUTROPHILS NFR BLD AUTO: 72.6 % (ref 42.7–76)
NEUTROPHILS NFR BLD AUTO: 8.49 10*3/MM3 (ref 1.7–7)
NITRITE UR QL STRIP: POSITIVE
NRBC BLD AUTO-RTO: 0 /100 WBC (ref 0–0.2)
PH UR STRIP.AUTO: 8 [PH] (ref 5–8)
PLATELET # BLD AUTO: 290 10*3/MM3 (ref 140–450)
PMV BLD AUTO: 9.6 FL (ref 6–12)
POTASSIUM SERPL-SCNC: 4.5 MMOL/L (ref 3.5–5.2)
PROCALCITONIN SERPL-MCNC: 0.13 NG/ML (ref 0–0.25)
PROT SERPL-MCNC: 7.2 G/DL (ref 6–8.5)
PROT UR QL STRIP: ABNORMAL
RBC # BLD AUTO: 4.83 10*6/MM3 (ref 4.14–5.8)
RBC # UR: ABNORMAL /HPF
REF LAB TEST METHOD: ABNORMAL
SARS-COV-2 RNA PNL SPEC NAA+PROBE: NOT DETECTED
SODIUM SERPL-SCNC: 141 MMOL/L (ref 136–145)
SP GR UR STRIP: 1.02 (ref 1–1.03)
SQUAMOUS #/AREA URNS HPF: ABNORMAL /HPF
UROBILINOGEN UR QL STRIP: ABNORMAL
WBC # BLD AUTO: 11.69 10*3/MM3 (ref 3.4–10.8)
WBC UR QL AUTO: ABNORMAL /HPF

## 2021-11-06 PROCEDURE — 83605 ASSAY OF LACTIC ACID: CPT | Performed by: EMERGENCY MEDICINE

## 2021-11-06 PROCEDURE — 87150 DNA/RNA AMPLIFIED PROBE: CPT | Performed by: EMERGENCY MEDICINE

## 2021-11-06 PROCEDURE — 25010000002 PIPERACILLIN SOD-TAZOBACTAM PER 1 G: Performed by: EMERGENCY MEDICINE

## 2021-11-06 PROCEDURE — 87147 CULTURE TYPE IMMUNOLOGIC: CPT | Performed by: EMERGENCY MEDICINE

## 2021-11-06 PROCEDURE — 81001 URINALYSIS AUTO W/SCOPE: CPT | Performed by: EMERGENCY MEDICINE

## 2021-11-06 PROCEDURE — 85025 COMPLETE CBC W/AUTO DIFF WBC: CPT | Performed by: EMERGENCY MEDICINE

## 2021-11-06 PROCEDURE — 99285 EMERGENCY DEPT VISIT HI MDM: CPT

## 2021-11-06 PROCEDURE — 87205 SMEAR GRAM STAIN: CPT | Performed by: EMERGENCY MEDICINE

## 2021-11-06 PROCEDURE — 25010000002 IOPAMIDOL 61 % SOLUTION: Performed by: EMERGENCY MEDICINE

## 2021-11-06 PROCEDURE — 87086 URINE CULTURE/COLONY COUNT: CPT | Performed by: EMERGENCY MEDICINE

## 2021-11-06 PROCEDURE — 84145 PROCALCITONIN (PCT): CPT | Performed by: EMERGENCY MEDICINE

## 2021-11-06 PROCEDURE — 87040 BLOOD CULTURE FOR BACTERIA: CPT | Performed by: EMERGENCY MEDICINE

## 2021-11-06 PROCEDURE — 87070 CULTURE OTHR SPECIMN AEROBIC: CPT | Performed by: EMERGENCY MEDICINE

## 2021-11-06 PROCEDURE — 87635 SARS-COV-2 COVID-19 AMP PRB: CPT | Performed by: EMERGENCY MEDICINE

## 2021-11-06 PROCEDURE — 80053 COMPREHEN METABOLIC PANEL: CPT | Performed by: EMERGENCY MEDICINE

## 2021-11-06 PROCEDURE — 74177 CT ABD & PELVIS W/CONTRAST: CPT

## 2021-11-06 PROCEDURE — 36415 COLL VENOUS BLD VENIPUNCTURE: CPT

## 2021-11-06 RX ORDER — IPRATROPIUM BROMIDE AND ALBUTEROL SULFATE 2.5; .5 MG/3ML; MG/3ML
3 SOLUTION RESPIRATORY (INHALATION)
Status: DISCONTINUED | OUTPATIENT
Start: 2021-11-06 | End: 2021-11-07

## 2021-11-06 RX ORDER — DEXTRAN 70, GLYCERIN, HYPROMELLOSE 1; 2; 3 MG/ML; MG/ML; MG/ML
2 SOLUTION/ DROPS OPHTHALMIC 2 TIMES DAILY
COMMUNITY
End: 2021-11-10 | Stop reason: HOSPADM

## 2021-11-06 RX ORDER — SODIUM CHLORIDE 9 MG/ML
75 INJECTION, SOLUTION INTRAVENOUS CONTINUOUS
Status: DISCONTINUED | OUTPATIENT
Start: 2021-11-06 | End: 2021-11-08

## 2021-11-06 RX ORDER — SODIUM CHLORIDE 0.9 % (FLUSH) 0.9 %
10 SYRINGE (ML) INJECTION AS NEEDED
Status: DISCONTINUED | OUTPATIENT
Start: 2021-11-06 | End: 2021-11-10 | Stop reason: HOSPADM

## 2021-11-06 RX ORDER — ZINC OXIDE 20 %
1 OINTMENT (GRAM) TOPICAL 2 TIMES DAILY
COMMUNITY

## 2021-11-06 RX ORDER — WATER / MINERAL OIL / WHITE PETROLATUM 16 OZ
1 CREAM TOPICAL AS NEEDED
Status: ON HOLD | COMMUNITY
End: 2021-11-06

## 2021-11-06 RX ORDER — WATER / MINERAL OIL / WHITE PETROLATUM 16 OZ
1 CREAM TOPICAL 2 TIMES DAILY
COMMUNITY
End: 2021-11-10 | Stop reason: HOSPADM

## 2021-11-06 RX ORDER — PANTOPRAZOLE SODIUM 20 MG/1
20 TABLET, DELAYED RELEASE ORAL DAILY
COMMUNITY
End: 2021-11-10 | Stop reason: HOSPADM

## 2021-11-06 RX ORDER — DIVALPROEX SODIUM 250 MG/1
250 TABLET, DELAYED RELEASE ORAL 2 TIMES DAILY
COMMUNITY

## 2021-11-06 RX ORDER — MUPIROCIN CALCIUM 20 MG/G
1 CREAM TOPICAL 2 TIMES DAILY
COMMUNITY

## 2021-11-06 RX ORDER — HYDROCODONE BITARTRATE AND ACETAMINOPHEN 5; 325 MG/1; MG/1
1 TABLET ORAL EVERY 4 HOURS PRN
Status: DISCONTINUED | OUTPATIENT
Start: 2021-11-06 | End: 2021-11-10

## 2021-11-06 RX ORDER — SIMETHICONE 80 MG
80 TABLET,CHEWABLE ORAL
COMMUNITY
End: 2021-11-10 | Stop reason: HOSPADM

## 2021-11-06 RX ORDER — IPRATROPIUM BROMIDE AND ALBUTEROL SULFATE 2.5; .5 MG/3ML; MG/3ML
3 SOLUTION RESPIRATORY (INHALATION) 4 TIMES DAILY
COMMUNITY

## 2021-11-06 RX ORDER — BUPROPION HYDROCHLORIDE 100 MG/1
100 TABLET, EXTENDED RELEASE ORAL ONCE
COMMUNITY
End: 2021-11-10 | Stop reason: HOSPADM

## 2021-11-06 RX ADMIN — IOPAMIDOL 85 ML: 612 INJECTION, SOLUTION INTRAVENOUS at 19:35

## 2021-11-06 RX ADMIN — TAZOBACTAM SODIUM AND PIPERACILLIN SODIUM 3.38 G: 375; 3 INJECTION, SOLUTION INTRAVENOUS at 18:22

## 2021-11-06 RX ADMIN — SODIUM CHLORIDE 1000 ML: 9 INJECTION, SOLUTION INTRAVENOUS at 17:35

## 2021-11-06 NOTE — ED TRIAGE NOTES
Pt has supra pubic catheter in place having drainage and pain. Nurse at [facility] also reports scrotal swelling and pain.     Patient masked in first look triage. I was wearing N 95 mask and goggles. `

## 2021-11-06 NOTE — ED NOTES
Patient having drainage from subrapubic catheter, it is yellow and has an odor, patient reports they sent him over due to the infection but has been on antibiotics.      Janet Lozano RN  11/06/21 8479

## 2021-11-06 NOTE — ED PROVIDER NOTES
" EMERGENCY DEPARTMENT ENCOUNTER    Room Number:  E563/1  Date of encounter:  11/8/2021  PCP: Jackson Marlow Jr., MD  Historian: Patient and EMS      HPI:  Chief Complaint: Abdominal pain  A complete HPI/ROS/PMH/PSH/SH/FH are unobtainable due to: Poor historian    Context: Everett Emerson is a 80 y.o. male who presents to the ED c/o lower abdominal pain for an undisclosed period of time, the patient cannot really tell me.  He does says is severe and he thinks is because of his suprapubic catheter.  He states it is leaking around it.  He said no fever that he is aware of, no nausea vomiting or diarrhea, and he cannot tell me how long he is had the catheter in place.    EMS reports that the patient is \"being treated\" for an infection around the suprapubic catheter, but cannot give any more information about how it is being treated and by whom.      PAST MEDICAL HISTORY  Active Ambulatory Problems     Diagnosis Date Noted   • Depression with suicidal ideation 01/19/2017   • Slow transit constipation 01/20/2017   • Hypertension 01/20/2017   • Cerebral palsy (HCC) 01/20/2017     Resolved Ambulatory Problems     Diagnosis Date Noted   • No Resolved Ambulatory Problems     Past Medical History:   Diagnosis Date   • Allergic rhinitis    • Anxiety    • Asthma    • BPH without obstruction/lower urinary tract symptoms    • Cancer ([facility])    • Constipation    • COPD (chronic obstructive pulmonary disease) ([facility])    • Depression    • Diabetes mellitus ([facility])    • GERD (gastroesophageal reflux disease)    • Hyperlipidemia    • Insomnia    • Mild cognitive impairment    • Mood disorder ([facility])    • Neuromuscular dysfunction of bladder    • Open wound of penis    • Osteoarthritis    • Spasmodic torticollis    • Xerosis cutis          PAST SURGICAL HISTORY  Past Surgical History:   Procedure Laterality Date   • SUPRAPUBIC CATHETER INSERTION           FAMILY HISTORY  History reviewed. No pertinent family history.      SOCIAL HISTORY  Social " History     Socioeconomic History   • Marital status: Single   Tobacco Use   • Smoking status: Never Smoker   • Smokeless tobacco: Never Used   Substance and Sexual Activity   • Alcohol use: No   • Drug use: No         ALLERGIES  Patient has no known allergies.        REVIEW OF SYSTEMS  Review of Systems   Limited due to poor historian      PHYSICAL EXAM    I have reviewed the triage vital signs and nursing notes.    ED Triage Vitals [11/06/21 1700]   Temp Heart Rate Resp BP SpO2   98.6 °F (37 °C) 57 18 148/64 98 %      Temp src Heart Rate Source Patient Position BP Location FiO2 (%)   -- -- -- -- --       Physical Exam  GENERAL: Awake and alert, nontoxic-appearing  HENT: nares patent  EYES: no scleral icterus  CV: regular rhythm, regular rate  RESPIRATORY: normal effort  ABDOMEN: soft, mild suprapubic tenderness to palpation.  There is a mature-looking suprapubic stoma with catheter in place.  There is no erythema, but it is tender to palpation around it with foul-smelling purulent discharge.  Rest of his abdomen is benign  MUSCULOSKELETAL: no deformity  NEURO: alert, appears to be at his neurologic baseline  SKIN: warm, dry        LAB RESULTS  Recent Results (from the past 24 hour(s))   Comprehensive Metabolic Panel    Collection Time: 11/08/21  7:00 AM    Specimen: Blood   Result Value Ref Range    Glucose 115 (H) 65 - 99 mg/dL    BUN 9 8 - 23 mg/dL    Creatinine 0.48 (L) 0.76 - 1.27 mg/dL    Sodium 141 136 - 145 mmol/L    Potassium 3.5 3.5 - 5.2 mmol/L    Chloride 109 (H) 98 - 107 mmol/L    CO2 25.7 22.0 - 29.0 mmol/L    Calcium 7.9 (L) 8.6 - 10.5 mg/dL    Total Protein 5.9 (L) 6.0 - 8.5 g/dL    Albumin 2.90 (L) 3.50 - 5.20 g/dL    ALT (SGPT) 9 1 - 41 U/L    AST (SGOT) 11 1 - 40 U/L    Alkaline Phosphatase 79 39 - 117 U/L    Total Bilirubin 0.4 0.0 - 1.2 mg/dL    eGFR Non African Amer >150 >60 mL/min/1.73    Globulin 3.0 gm/dL    A/G Ratio 1.0 g/dL    BUN/Creatinine Ratio 18.8 7.0 - 25.0    Anion Gap 6.3 5.0 -  15.0 mmol/L   BNP    Collection Time: 11/08/21  7:00 AM    Specimen: Blood   Result Value Ref Range    proBNP 61.7 0.0-1,800.0 pg/mL   TSH    Collection Time: 11/08/21  7:00 AM    Specimen: Blood   Result Value Ref Range    TSH 4.070 0.270 - 4.200 uIU/mL   Lipid Panel    Collection Time: 11/08/21  7:00 AM    Specimen: Blood   Result Value Ref Range    Total Cholesterol 104 0 - 200 mg/dL    Triglycerides 80 0 - 150 mg/dL    HDL Cholesterol 29 (L) 40 - 60 mg/dL    LDL Cholesterol  59 0 - 100 mg/dL    VLDL Cholesterol 16 5 - 40 mg/dL    LDL/HDL Ratio 2.03    Hemoglobin A1c    Collection Time: 11/08/21  7:00 AM    Specimen: Blood   Result Value Ref Range    Hemoglobin A1C 6.50 (H) 4.80 - 5.60 %   CBC Auto Differential    Collection Time: 11/08/21  7:00 AM    Specimen: Blood   Result Value Ref Range    WBC 9.93 3.40 - 10.80 10*3/mm3    RBC 4.22 4.14 - 5.80 10*6/mm3    Hemoglobin 11.9 (L) 13.0 - 17.7 g/dL    Hematocrit 38.4 37.5 - 51.0 %    MCV 91.0 79.0 - 97.0 fL    MCH 28.2 26.6 - 33.0 pg    MCHC 31.0 (L) 31.5 - 35.7 g/dL    RDW 13.2 12.3 - 15.4 %    RDW-SD 44.5 37.0 - 54.0 fl    MPV 9.7 6.0 - 12.0 fL    Platelets 263 140 - 450 10*3/mm3    Neutrophil % 65.0 42.7 - 76.0 %    Lymphocyte % 15.1 (L) 19.6 - 45.3 %    Monocyte % 10.5 5.0 - 12.0 %    Eosinophil % 8.4 (H) 0.3 - 6.2 %    Basophil % 0.5 0.0 - 1.5 %    Immature Grans % 0.5 0.0 - 0.5 %    Neutrophils, Absolute 6.46 1.70 - 7.00 10*3/mm3    Lymphocytes, Absolute 1.50 0.70 - 3.10 10*3/mm3    Monocytes, Absolute 1.04 (H) 0.10 - 0.90 10*3/mm3    Eosinophils, Absolute 0.83 (H) 0.00 - 0.40 10*3/mm3    Basophils, Absolute 0.05 0.00 - 0.20 10*3/mm3    Immature Grans, Absolute 0.05 0.00 - 0.05 10*3/mm3    nRBC 0.0 0.0 - 0.2 /100 WBC       Ordered the above labs and independently reviewed the results.        RADIOLOGY  No Radiology Exams Resulted Within Past 24 Hours    I ordered the above noted radiological studies. Reviewed by me and discussed with radiologist.  See  dictation for official radiology interpretation.      PROCEDURES    Procedures      MEDICATIONS GIVEN IN ER    Medications   sodium chloride 0.9 % flush 10 mL (has no administration in time range)   sodium chloride 0.9 % infusion (75 mL/hr Intravenous New Bag 11/8/21 0851)   HYDROcodone-acetaminophen (NORCO) 5-325 MG per tablet 1 tablet (1 tablet Oral Not Given 11/8/21 1159)   piperacillin-tazobactam (ZOSYN) 3.375 g in iso-osmotic dextrose 50 ml (premix) (3.375 g Intravenous New Bag 11/8/21 0851)   tamsulosin (FLOMAX) 24 hr capsule 0.4 mg (0.4 mg Oral Given 11/7/21 2045)   divalproex (DEPAKOTE) DR tablet 250 mg (250 mg Oral Given 11/8/21 0851)   budesonide-formoterol (SYMBICORT) 80-4.5 MCG/ACT inhaler 2 puff (2 puffs Inhalation Given 11/8/21 0734)   polyethylene glycol (MIRALAX) packet 17 g (17 g Oral Given 11/8/21 0851)   pravastatin (PRAVACHOL) tablet 40 mg (40 mg Oral Given 11/7/21 2045)   ipratropium-albuterol (DUO-NEB) nebulizer solution 3 mL (3 mL Nebulization Given 11/8/21 1126)   pantoprazole (PROTONIX) EC tablet 40 mg (40 mg Oral Given 11/8/21 0851)   Pharmacy to dose vancomycin (has no administration in time range)   vancomycin 1750 mg/500 mL 0.9% NS IVPB ([facility]) (1,750 mg Intravenous New Bag 11/8/21 1159)   vancomycin 1250 mg/250 mL 0.9% NS IVPB ([facility]) (has no administration in time range)   sodium chloride 0.9 % bolus 1,000 mL (0 mL Intravenous Stopped 11/6/21 1929)   piperacillin-tazobactam (ZOSYN) 3.375 g in iso-osmotic dextrose 50 ml (premix) (0 g Intravenous Stopped 11/6/21 1908)   iopamidol (ISOVUE-300) 61 % injection 100 mL (85 mL Intravenous Given 11/6/21 1935)   buPROPion SR (WELLBUTRIN SR) 12 hr tablet 100 mg (100 mg Oral Given 11/7/21 1427)         PROGRESS, DATA ANALYSIS, CONSULTS, AND MEDICAL DECISION MAKING    All labs have been independently reviewed by me.  All radiology studies have been reviewed by me and discussed with radiologist dictating the report.   EKG's independently viewed and  interpreted by me.  Discussion below represents my analysis of pertinent findings related to patient's condition, differential diagnosis, treatment plan and final disposition.        ED Course as of 11/08/21 1335   Mon Nov 08, 2021   1334 Mild leukocytosis, chemistry unremarkable [DP]   1334 Pro-Christopher and lactate normal [DP]   1334 Urinalysis does appear to show UTI [DP]   1334 CT of the abdomen pelvis shows no acute abnormality which would explain the fever or the pain. [DP]   1334 Patient was given IV Zosyn due to presumed infection.  We obtained cultures of urine, blood and skin of the ostomy.  The patient will be admitted to a telemetry bed as per my discussion with Dr. Hirsch. [DP]      ED Course User Index  [DP] Emilio Banegas MD           PPE: The patient wore a surgical mask throughout the entire patient encounter. I wore an N95.    AS OF 13:35 EST VITALS:    BP - 159/64  HR - 88  TEMP - 97.4 °F (36.3 °C) (Oral)  O2 SATS - 96%        DIAGNOSIS  Final diagnoses:   Acute UTI   Generalized abdominal pain         DISPOSITION  Admit           Emilio Banegas MD  11/08/21 1335

## 2021-11-07 LAB
ANION GAP SERPL CALCULATED.3IONS-SCNC: 11.7 MMOL/L (ref 5–15)
BASOPHILS # BLD AUTO: 0.06 10*3/MM3 (ref 0–0.2)
BASOPHILS NFR BLD AUTO: 0.5 % (ref 0–1.5)
BUN SERPL-MCNC: 12 MG/DL (ref 8–23)
BUN/CREAT SERPL: 19.7 (ref 7–25)
CALCIUM SPEC-SCNC: 8.6 MG/DL (ref 8.6–10.5)
CHLORIDE SERPL-SCNC: 104 MMOL/L (ref 98–107)
CO2 SERPL-SCNC: 24.3 MMOL/L (ref 22–29)
CREAT SERPL-MCNC: 0.61 MG/DL (ref 0.76–1.27)
DEPRECATED RDW RBC AUTO: 42.7 FL (ref 37–54)
EOSINOPHIL # BLD AUTO: 0.82 10*3/MM3 (ref 0–0.4)
EOSINOPHIL NFR BLD AUTO: 6.9 % (ref 0.3–6.2)
ERYTHROCYTE [DISTWIDTH] IN BLOOD BY AUTOMATED COUNT: 13.1 % (ref 12.3–15.4)
GFR SERPL CREATININE-BSD FRML MDRD: 128 ML/MIN/1.73
GLUCOSE SERPL-MCNC: 113 MG/DL (ref 65–99)
HCT VFR BLD AUTO: 41.5 % (ref 37.5–51)
HGB BLD-MCNC: 13.5 G/DL (ref 13–17.7)
IMM GRANULOCYTES # BLD AUTO: 0.07 10*3/MM3 (ref 0–0.05)
IMM GRANULOCYTES NFR BLD AUTO: 0.6 % (ref 0–0.5)
LYMPHOCYTES # BLD AUTO: 1.44 10*3/MM3 (ref 0.7–3.1)
LYMPHOCYTES NFR BLD AUTO: 12.2 % (ref 19.6–45.3)
MCH RBC QN AUTO: 28.7 PG (ref 26.6–33)
MCHC RBC AUTO-ENTMCNC: 32.5 G/DL (ref 31.5–35.7)
MCV RBC AUTO: 88.1 FL (ref 79–97)
MONOCYTES # BLD AUTO: 1.19 10*3/MM3 (ref 0.1–0.9)
MONOCYTES NFR BLD AUTO: 10.1 % (ref 5–12)
NEUTROPHILS NFR BLD AUTO: 69.7 % (ref 42.7–76)
NEUTROPHILS NFR BLD AUTO: 8.26 10*3/MM3 (ref 1.7–7)
NRBC BLD AUTO-RTO: 0 /100 WBC (ref 0–0.2)
PLATELET # BLD AUTO: 279 10*3/MM3 (ref 140–450)
PMV BLD AUTO: 9.2 FL (ref 6–12)
POTASSIUM SERPL-SCNC: 3.7 MMOL/L (ref 3.5–5.2)
RBC # BLD AUTO: 4.71 10*6/MM3 (ref 4.14–5.8)
SODIUM SERPL-SCNC: 140 MMOL/L (ref 136–145)
WBC # BLD AUTO: 11.84 10*3/MM3 (ref 3.4–10.8)

## 2021-11-07 PROCEDURE — 94799 UNLISTED PULMONARY SVC/PX: CPT

## 2021-11-07 PROCEDURE — 94640 AIRWAY INHALATION TREATMENT: CPT

## 2021-11-07 PROCEDURE — 94664 DEMO&/EVAL PT USE INHALER: CPT

## 2021-11-07 PROCEDURE — 25010000002 PIPERACILLIN SOD-TAZOBACTAM PER 1 G: Performed by: HOSPITALIST

## 2021-11-07 PROCEDURE — 80048 BASIC METABOLIC PNL TOTAL CA: CPT | Performed by: HOSPITALIST

## 2021-11-07 PROCEDURE — 85025 COMPLETE CBC W/AUTO DIFF WBC: CPT | Performed by: HOSPITALIST

## 2021-11-07 RX ORDER — POLYETHYLENE GLYCOL 3350 17 G/17G
17 POWDER, FOR SOLUTION ORAL DAILY
Status: DISCONTINUED | OUTPATIENT
Start: 2021-11-07 | End: 2021-11-10 | Stop reason: HOSPADM

## 2021-11-07 RX ORDER — PRAVASTATIN SODIUM 40 MG
40 TABLET ORAL NIGHTLY
Status: DISCONTINUED | OUTPATIENT
Start: 2021-11-07 | End: 2021-11-08

## 2021-11-07 RX ORDER — PANTOPRAZOLE SODIUM 20 MG/1
20 TABLET, DELAYED RELEASE ORAL DAILY
Status: DISCONTINUED | OUTPATIENT
Start: 2021-11-07 | End: 2021-11-07

## 2021-11-07 RX ORDER — TAMSULOSIN HYDROCHLORIDE 0.4 MG/1
0.4 CAPSULE ORAL NIGHTLY
Status: DISCONTINUED | OUTPATIENT
Start: 2021-11-07 | End: 2021-11-10 | Stop reason: HOSPADM

## 2021-11-07 RX ORDER — DIVALPROEX SODIUM 250 MG/1
250 TABLET, DELAYED RELEASE ORAL 2 TIMES DAILY
Status: DISCONTINUED | OUTPATIENT
Start: 2021-11-07 | End: 2021-11-10 | Stop reason: HOSPADM

## 2021-11-07 RX ORDER — BUDESONIDE AND FORMOTEROL FUMARATE DIHYDRATE 80; 4.5 UG/1; UG/1
2 AEROSOL RESPIRATORY (INHALATION)
Status: DISCONTINUED | OUTPATIENT
Start: 2021-11-07 | End: 2021-11-10 | Stop reason: HOSPADM

## 2021-11-07 RX ORDER — BUPROPION HYDROCHLORIDE 100 MG/1
100 TABLET, EXTENDED RELEASE ORAL ONCE
Status: COMPLETED | OUTPATIENT
Start: 2021-11-07 | End: 2021-11-07

## 2021-11-07 RX ORDER — IPRATROPIUM BROMIDE AND ALBUTEROL SULFATE 2.5; .5 MG/3ML; MG/3ML
3 SOLUTION RESPIRATORY (INHALATION)
Status: DISCONTINUED | OUTPATIENT
Start: 2021-11-07 | End: 2021-11-08

## 2021-11-07 RX ORDER — PANTOPRAZOLE SODIUM 40 MG/1
40 TABLET, DELAYED RELEASE ORAL DAILY
Status: DISCONTINUED | OUTPATIENT
Start: 2021-11-07 | End: 2021-11-10 | Stop reason: HOSPADM

## 2021-11-07 RX ADMIN — IPRATROPIUM BROMIDE AND ALBUTEROL SULFATE 3 ML: .5; 3 SOLUTION RESPIRATORY (INHALATION) at 00:14

## 2021-11-07 RX ADMIN — HYDROCODONE BITARTRATE AND ACETAMINOPHEN 1 TABLET: 5; 325 TABLET ORAL at 23:15

## 2021-11-07 RX ADMIN — SODIUM CHLORIDE 75 ML/HR: 9 INJECTION, SOLUTION INTRAVENOUS at 00:57

## 2021-11-07 RX ADMIN — IPRATROPIUM BROMIDE AND ALBUTEROL SULFATE 3 ML: .5; 3 SOLUTION RESPIRATORY (INHALATION) at 10:31

## 2021-11-07 RX ADMIN — HYDROCODONE BITARTRATE AND ACETAMINOPHEN 1 TABLET: 5; 325 TABLET ORAL at 05:01

## 2021-11-07 RX ADMIN — TAZOBACTAM SODIUM AND PIPERACILLIN SODIUM 3.38 G: 375; 3 INJECTION, SOLUTION INTRAVENOUS at 00:57

## 2021-11-07 RX ADMIN — DIVALPROEX SODIUM 250 MG: 250 TABLET, DELAYED RELEASE ORAL at 14:27

## 2021-11-07 RX ADMIN — IPRATROPIUM BROMIDE AND ALBUTEROL SULFATE 3 ML: 2.5; .5 SOLUTION RESPIRATORY (INHALATION) at 15:29

## 2021-11-07 RX ADMIN — IPRATROPIUM BROMIDE AND ALBUTEROL SULFATE 3 ML: .5; 3 SOLUTION RESPIRATORY (INHALATION) at 06:28

## 2021-11-07 RX ADMIN — BUDESONIDE AND FORMOTEROL FUMARATE DIHYDRATE 2 PUFF: 80; 4.5 AEROSOL RESPIRATORY (INHALATION) at 19:53

## 2021-11-07 RX ADMIN — BUPROPION HYDROCHLORIDE 100 MG: 100 TABLET, EXTENDED RELEASE ORAL at 14:27

## 2021-11-07 RX ADMIN — SODIUM CHLORIDE 75 ML/HR: 9 INJECTION, SOLUTION INTRAVENOUS at 18:09

## 2021-11-07 RX ADMIN — TAZOBACTAM SODIUM AND PIPERACILLIN SODIUM 3.38 G: 375; 3 INJECTION, SOLUTION INTRAVENOUS at 16:14

## 2021-11-07 RX ADMIN — TAZOBACTAM SODIUM AND PIPERACILLIN SODIUM 3.38 G: 375; 3 INJECTION, SOLUTION INTRAVENOUS at 23:15

## 2021-11-07 RX ADMIN — PRAVASTATIN SODIUM 40 MG: 40 TABLET ORAL at 20:45

## 2021-11-07 RX ADMIN — TAZOBACTAM SODIUM AND PIPERACILLIN SODIUM 3.38 G: 375; 3 INJECTION, SOLUTION INTRAVENOUS at 08:30

## 2021-11-07 RX ADMIN — TAMSULOSIN HYDROCHLORIDE 0.4 MG: 0.4 CAPSULE ORAL at 20:45

## 2021-11-07 RX ADMIN — DIVALPROEX SODIUM 250 MG: 250 TABLET, DELAYED RELEASE ORAL at 20:45

## 2021-11-07 RX ADMIN — PANTOPRAZOLE SODIUM 40 MG: 40 TABLET, DELAYED RELEASE ORAL at 16:48

## 2021-11-07 RX ADMIN — POLYETHYLENE GLYCOL 3350 17 G: 17 POWDER, FOR SOLUTION ORAL at 14:31

## 2021-11-07 NOTE — PLAN OF CARE
Problem: Adult Inpatient Plan of Care  Goal: Plan of Care Review  Outcome: Ongoing, Progressing  Flowsheets (Taken 11/7/2021 1751)  Progress: no change  Plan of Care Reviewed With: patient  Outcome Summary: VSS, room air, q2h turn, low air loss mattress, buttock with what appears to be a fungal rash, antifungal cream at bedside, WOCN to see tomorrow, suprapubic cathether to be changed tomorrow by urology.  Goal: Patient-Specific Goal (Individualized)  Outcome: Ongoing, Progressing  Goal: Absence of Hospital-Acquired Illness or Injury  Outcome: Ongoing, Progressing  Intervention: Identify and Manage Fall Risk  Recent Flowsheet Documentation  Taken 11/7/2021 1600 by Meghna Caballero RN  Safety Promotion/Fall Prevention:   assistive device/personal items within reach   activity supervised   clutter free environment maintained   fall prevention program maintained   nonskid shoes/slippers when out of bed   room organization consistent   safety round/check completed  Taken 11/7/2021 1415 by Meghna Caballero RN  Safety Promotion/Fall Prevention:   clutter free environment maintained   assistive device/personal items within reach   activity supervised   fall prevention program maintained   nonskid shoes/slippers when out of bed   room organization consistent   safety round/check completed  Taken 11/7/2021 1005 by Meghna Caballero RN  Safety Promotion/Fall Prevention:   activity supervised   assistive device/personal items within reach   clutter free environment maintained   fall prevention program maintained   nonskid shoes/slippers when out of bed   room organization consistent   safety round/check completed  Taken 11/7/2021 0810 by Meghna Caballero, RN  Safety Promotion/Fall Prevention:   clutter free environment maintained   assistive device/personal items within reach   activity supervised   fall prevention program maintained   nonskid shoes/slippers when out of bed   safety round/check completed   room  organization consistent  Intervention: Prevent Skin Injury  Recent Flowsheet Documentation  Taken 11/7/2021 1600 by Meghna Caballero RN  Body Position:   turned   tilted, left  Taken 11/7/2021 1415 by Meghna Caballero RN  Body Position:   supine   turned  Taken 11/7/2021 1200 by Meghna Caballero RN  Body Position:   turned   tilted, left  Taken 11/7/2021 1005 by Meghna Caballero RN  Body Position: position maintained  Goal: Optimal Comfort and Wellbeing  Outcome: Ongoing, Progressing  Goal: Readiness for Transition of Care  Outcome: Ongoing, Progressing     Problem: Fall Injury Risk  Goal: Absence of Fall and Fall-Related Injury  Outcome: Ongoing, Progressing  Intervention: Promote Injury-Free Environment  Recent Flowsheet Documentation  Taken 11/7/2021 1600 by Meghna Caballero RN  Safety Promotion/Fall Prevention:   assistive device/personal items within reach   activity supervised   clutter free environment maintained   fall prevention program maintained   nonskid shoes/slippers when out of bed   room organization consistent   safety round/check completed  Taken 11/7/2021 1415 by Meghna Caballero RN  Safety Promotion/Fall Prevention:   clutter free environment maintained   assistive device/personal items within reach   activity supervised   fall prevention program maintained   nonskid shoes/slippers when out of bed   room organization consistent   safety round/check completed  Taken 11/7/2021 1005 by Meghna Caballero RN  Safety Promotion/Fall Prevention:   activity supervised   assistive device/personal items within reach   clutter free environment maintained   fall prevention program maintained   nonskid shoes/slippers when out of bed   room organization consistent   safety round/check completed  Taken 11/7/2021 0810 by Meghna Caballero RN  Safety Promotion/Fall Prevention:   clutter free environment maintained   assistive device/personal items within reach   activity supervised   fall  prevention program maintained   nonskid shoes/slippers when out of bed   safety round/check completed   room organization consistent     Problem: Skin Injury Risk Increased  Goal: Skin Health and Integrity  Outcome: Ongoing, Progressing  Intervention: Optimize Skin Protection  Recent Flowsheet Documentation  Taken 11/7/2021 1600 by Meghna Caballero RN  Head of Bed (HOB): HOB at 30-45 degrees  Taken 11/7/2021 1415 by Meghna Caballero RN  Head of Bed (HOB): HOB at 30-45 degrees  Taken 11/7/2021 1200 by Meghna Caballero RN  Head of Bed (HOB): HOB at 30-45 degrees  Taken 11/7/2021 1005 by Meghna Caballero RN  Head of Bed (HOB): HOB at 30-45 degrees  Taken 11/7/2021 0810 by Meghna Caballero RN  Pressure Reduction Techniques:   heels elevated off bed   frequent weight shift encouraged   weight shift assistance provided  Pressure Reduction Devices: (low air loss matress ordered)   heel offloading device utilized   alternating pressure pump (ADD)     Problem: Pain Acute  Goal: Optimal Pain Control  Outcome: Ongoing, Progressing     Problem: UTI (Urinary Tract Infection)  Goal: Improved Infection Symptoms  Outcome: Ongoing, Progressing   Goal Outcome Evaluation:  Plan of Care Reviewed With: patient        Progress: no change  Outcome Summary: VSS, room air, q2h turn, low air loss mattress, buttock with what appears to be a fungal rash, antifungal cream at bedside, WOCN to see tomorrow, suprapubic cathether to be changed tomorrow by urology.

## 2021-11-07 NOTE — CONSULTS
Urology Consult Note    Patient:Everett Emerson :1940  Room:[facility]  Admit Date2021  Age:80 y.o.     SEX:male     DOS:2021     MR:[id number]     Visit:[id number]       Attending: Lázaro Hirsch MD  Referring Provider: NATHAN Finnegan  Reason for Consultation: UTI     Patient Care Team:  Jackson Marlow Jr., MD as PCP - General (Family Medicine)  Jackson Marlow Jr., MD as Consulting Physician (Family Medicine)    Chief complaint Lower abdominal pain     Subjective .     History of present illness:  Patient is known to our practice, last seen by Dr. Kemar Giles in the office in 2020.  He has a history of prostate cancer s/p IMRT , chronic urinary retention with suprapubic tube, and hydrocele.  Patient is alert and oriented. SPT is intact, draining prurlent urine. Patient uncertain of last SPT change at NH. Discussed with nursing, 18 fr charlton catheter is currently unavailable. Requested to have at bedside for SPT in am. SPT hand irrigated and placement adjusted and confirmed this morning. Urine draining from SPT. Some bladder spasms around cathter and via phallus.     Review of Systems  Constitutional:  No recent weight gain/loss, fever, chills  Opthalmalogic: No change in vision  Otolaryngeal:  No epistaxis  Cardiovascular: No recent chest pain  Pulmonary:  No cough  Gastrointestinal: Lower abdominal pain  Genitourinary:  See HPI  Hematologic:  + bruises easily   Musculoskeletal: Denies pain  Neurologic:  No Seizures, new focal deficits      History  Past Medical History:   Diagnosis Date   • Allergic rhinitis    • Anxiety    • Asthma    • BPH without obstruction/lower urinary tract symptoms    • Cancer (HCC)    • Cerebral palsy (HCC)    • Constipation    • COPD (chronic obstructive pulmonary disease) (HCC)    • Depression    • Diabetes mellitus (HCC)     type 2   • GERD (gastroesophageal reflux disease)    • Hyperlipidemia    • Hypertension    • Insomnia    • Mild cognitive impairment    •  Mood disorder (HCC)    • Neuromuscular dysfunction of bladder    • Open wound of penis    • Osteoarthritis    • Spasmodic torticollis    • Xerosis cutis      Past Surgical History:   Procedure Laterality Date   • SUPRAPUBIC CATHETER INSERTION       Social History     Socioeconomic History   • Marital status: Single   Tobacco Use   • Smoking status: Never Smoker   • Smokeless tobacco: Never Used   Substance and Sexual Activity   • Alcohol use: No   • Drug use: No     History reviewed. No pertinent family history.  No Known Allergies  Prior to Admission medications    Medication Sig Start Date End Date Taking? Authorizing Provider   alfuzosin (UROXATRAL) 10 MG 24 hr tablet Take 10 mg by mouth Every Night.   Yes Jonatan Jacobson MD   Artificial Tear Solution (GenTeal Tears) 0.1-0.2-0.3 % solution Apply 2 drops to eye(s) as directed by provider 2 (Two) Times a Day.   Yes Jonatan Jacobson MD   baclofen (LIORESAL) 20 MG tablet Take 20 mg by mouth 3 (Three) Times a Day.   Yes Jonatan Jacobson MD   buPROPion SR (WELLBUTRIN SR) 100 MG 12 hr tablet Take 100 mg by mouth 1 (One) Time.   Yes Jonatan Jacobson MD   Calcium Polycarbophil 625 MG chewable tablet Chew. 1 tablet four times a day   Yes Jonatan Jacobson MD   CLOTRIMAZOLE-BETAMETHASONE EX Apply  topically 3 (Three) Times a Day. Apply to buttocks TID   Yes Jonatan Jacobson MD   divalproex (DEPAKOTE) 250 MG DR tablet Take 250 mg by mouth 2 (Two) Times a Day.   Yes Jonatan Jacobson MD   Fluticasone Furoate-Vilanterol (Breo Ellipta) 100-25 MCG/INH inhaler Inhale 1 puff Daily.   Yes Jonatan Jacobson MD   Fluticasone Furoate-Vilanterol (Breo Ellipta) 100-25 MCG/INH inhaler Inhale 1 puff Daily.   Yes Jonatan Jacobson MD   Ipratropium Bromide HFA (ATROVENT HFA IN) Inhale 2 puffs.   Yes Jonatan Jacobson MD   ipratropium-albuterol (DUO-NEB) 0.5-2.5 mg/3 ml nebulizer Take 3 mL by nebulization 4 (Four) Times a Day.   Yes Indira  MD Jonatan   Loratadine 10 MG capsule Take 10 mg by mouth Daily.   Yes Jonatan Jacobson MD   miconazole (Mae Antifungal) 2 % cream Apply 1 application topically to the appropriate area as directed 2 (Two) Times a Day. Apply to buttocks q shift   Yes Jonatan Jacosbon MD   mupirocin (BACTROBAN) 2 % cream Apply 1 application topically to the appropriate area as directed 2 (Two) Times a Day.   Yes Jonatan Jacobson MD   mupirocin (BACTROBAN) 2 % ointment Apply 1 application topically to the appropriate area as directed 3 (Three) Times a Day.   Yes Jonatan Jacobson MD   pantoprazole (PROTONIX) 20 MG EC tablet Take 20 mg by mouth Daily.   Yes Jonatan Jacobson MD   POLYETHYLENE GLYCOL 3350 PO Take 1 packet by mouth Daily.   Yes Jonatan Jacobson MD   pravastatin (PRAVACHOL) 20 MG tablet Take 40 mg by mouth Every Night.   Yes Jonatan Jacobson MD   simethicone (MYLICON) 80 MG chewable tablet Chew 80 mg 4 (Four) Times a Day Before Meals & at Bedtime.   Yes Jonatan Jacobson MD   Skin Protectants, Misc. (eucerin) cream Apply 1 application topically to the appropriate area as directed 2 (Two) Times a Day.   Yes Jonatan Jacobson MD   tuberculin (Tubersol) 5 UNIT/0.1ML injection Inject  into the appropriate area of the skin as directed by provider 1 (One) Time.   Yes Jonatan Jacobson MD   zinc oxide 20 % ointment Apply 1 application topically to the appropriate area as directed 2 (Two) Times a Day.   Yes Jonatan Jacobson MD   buPROPion XL (FORFIVO XL) 450 MG 24 hr tablet Take 1 tablet by mouth Every Morning. 17   Aamir Bess III, MD         Objective     tMax 24 hours:  Temp (24hrs), Av.2 °F (36.8 °C), Min:97.8 °F (36.6 °C), Max:98.6 °F (37 °C)    Vital Sign Ranges:  Temp:  [97.8 °F (36.6 °C)-98.6 °F (37 °C)] 98.5 °F (36.9 °C)  Heart Rate:  [57-97] 83  Resp:  [16-20] 20  BP: (132-179)/() 132/79  Intake and Output Last 3 Shifts:  I/O last 3  "completed shifts:  In: 1120 [P.O.:120; IV Piggyback:1000]  Out: -       Physical Exam:     Vital signs: /79 (BP Location: Left arm, Patient Position: Lying)   Pulse 83   Temp 98.5 °F (36.9 °C) (Oral)   Resp 20   Ht 182.9 cm (72\")   Wt 82.6 kg (182 lb 3.2 oz)   SpO2 94%   BMI 24.71 kg/m²   94%     General: Alert and oriented x 3    Appears stated age. No apparent distress.    HEENT: Moist mucous membranes of the oral mucosa & nasal mucosa.    Lips are not cyanotic.    Extraocular movements intact    Neck:  Trachea position is midline.     Respiratory: Respiratory rhythm & depth: Normal.    Respiratory effort:  Normal.          GI:  Nondistended. Nontender.    Skin:  Inspection: No rash.    Palpation:  Warm, dry. No induration.     Extremities: No clubbing & no cyanosis.    No edema    :  SPT intact, hand irrigated and repositioned, draining urine.     Hydrocele    Phallus with split meatus           Results Review:     Lab Results (last 24 hours)     Procedure Component Value Units Date/Time    Urine Culture - Urine, Urine, Catheter [[id number]]  (Normal) Collected: 11/06/21 1823    Specimen: Urine, Catheter Updated: 11/07/21 0627     Urine Culture Growth present, too young to evaluate    Blood Culture - Blood, Arm, Right [[id number]] Collected: 11/06/21 1730    Specimen: Blood from Arm, Right Updated: 11/06/21 2314    Wound Culture - Wound, Abdominal Wall [[id number]] Collected: 11/06/21 2002    Specimen: Wound from Abdominal Wall Updated: 11/06/21 2123     Gram Stain Moderate (3+) WBCs seen      Rare (1+) Gram positive cocci      Rare (1+) Gram positive bacilli      Occasional Gram negative bacilli    Urinalysis, Microscopic Only - Urine, Catheter [[id number]]  (Abnormal) Collected: 11/06/21 1823    Specimen: Urine, Catheter Updated: 11/06/21 1932     RBC, UA Too Numerous to Count /HPF      WBC, UA Too Numerous to Count /HPF      Bacteria, UA 4+ /HPF      Squamous Epithelial Cells, UA 7-12 /HPF      " Hyaline Casts, UA None Seen /LPF      Methodology Manual Light Microscopy    Urinalysis With Culture If Indicated - Urine, Catheter [[id number]]  (Abnormal) Collected: 11/06/21 1823    Specimen: Urine, Catheter Updated: 11/06/21 1909     Color, UA Yellow     Appearance, UA Turbid     pH, UA 8.0     Specific Gravity, UA 1.017     Glucose, UA Negative     Ketones, UA Negative     Bilirubin, UA Negative     Blood, UA Large (3+)     Protein, UA >=300 mg/dL (3+)     Leuk Esterase, UA Large (3+)     Nitrite, UA Positive     Urobilinogen, UA 1.0 E.U./dL    Comprehensive Metabolic Panel [[id number]]  (Abnormal) Collected: 11/06/21 1730    Specimen: Blood Updated: 11/06/21 1822     Glucose 154 mg/dL      BUN 15 mg/dL      Creatinine 0.52 mg/dL      Sodium 141 mmol/L      Potassium 4.5 mmol/L      Chloride 104 mmol/L      CO2 26.4 mmol/L      Calcium 9.0 mg/dL      Total Protein 7.2 g/dL      Albumin 3.60 g/dL      ALT (SGPT) 7 U/L      AST (SGOT) 9 U/L      Alkaline Phosphatase 98 U/L      Total Bilirubin 0.5 mg/dL      eGFR Non African Amer >150 mL/min/1.73      Globulin 3.6 gm/dL      A/G Ratio 1.0 g/dL      BUN/Creatinine Ratio 28.8     Anion Gap 10.6 mmol/L     Narrative:      GFR Normal >60  Chronic Kidney Disease <60  Kidney Failure <15      COVID PRE-OP / PRE-PROCEDURE SCREENING ORDER (NO ISOLATION) - Swab, Nasopharynx [[id number]]  (Normal) Collected: 11/06/21 1731    Specimen: Swab from Nasopharynx Updated: 11/06/21 1814    Narrative:      The following orders were created for panel order COVID PRE-OP / PRE-PROCEDURE SCREENING ORDER (NO ISOLATION) - Swab, Nasopharynx.  Procedure                               Abnormality         Status                     ---------                               -----------         ------                     COVID-LINH Silva IN-HOUSE...[[id number]]  Normal              Final result                 Please view results for these tests on the individual orders.    COVID-LINH Silva IN-HOUSE  "CEPHEID/JANINE NP SWAB IN TRANSPORT MEDIA 8-12 HR TAT - Swab, Nasopharynx [[id number]]  (Normal) Collected: 11/06/21 1731    Specimen: Swab from Nasopharynx Updated: 11/06/21 1814     COVID19 Not Detected    Narrative:      Fact sheet for providers: https://www.fda.gov/media/254177/download    Fact sheet for patients: https://www.fda.gov/media/195610/download    Test performed by PCR.    Procalcitonin [[id number]]  (Normal) Collected: 11/06/21 1730    Specimen: Blood Updated: 11/06/21 1811     Procalcitonin 0.13 ng/mL     Narrative:      As a Marker for Sepsis (Non-Neonates):     1. <0.5 ng/mL represents a low risk of severe sepsis and/or septic shock.  2. >2 ng/mL represents a high risk of severe sepsis and/or septic shock.    As a Marker for Lower Respiratory Tract Infections that require antibiotic therapy:  PCT on Admission     Antibiotic Therapy             6-12 Hrs later  >0.5                          Strongly Recommended            >0.25 - <0.5             Recommended  0.1 - 0.25                  Discouraged                       Remeasure/reassess PCT  <0.1                         Strongly Discouraged         Remeasure/reassess PCT      As 28 day mortality risk marker: \"Change in Procalcitonin Result\" (>80% or <=80%) if Day 0 (or Day 1) and Day 4 values are available. Refer to http://www.Quick Keys-pct-calculator.com/    Change in PCT <=80 %   A decrease of PCT levels below or equal to 80% defines a positive change in PCT test result representing a higher risk for 28-day all-cause mortality of patients diagnosed with severe sepsis or septic shock.    Change in PCT >80 %   A decrease of PCT levels of more than 80% defines a negative change in PCT result representing a lower risk for 28-day all-cause mortality of patients diagnosed with severe sepsis or septic shock.                Lactic Acid, Plasma [[id number]]  (Normal) Collected: 11/06/21 1730    Specimen: Blood Updated: 11/06/21 1802     Lactate 1.5 mmol/L     " CBC & Differential [[id number]]  (Abnormal) Collected: 11/06/21 1730    Specimen: Blood Updated: 11/06/21 1744    Narrative:      The following orders were created for panel order CBC & Differential.  Procedure                               Abnormality         Status                     ---------                               -----------         ------                     CBC Auto Differential[[id number]]        Abnormal            Final result                 Please view results for these tests on the individual orders.    CBC Auto Differential [[id number]]  (Abnormal) Collected: 11/06/21 1730    Specimen: Blood Updated: 11/06/21 1744     WBC 11.69 10*3/mm3      RBC 4.83 10*6/mm3      Hemoglobin 14.2 g/dL      Hematocrit 41.9 %      MCV 86.7 fL      MCH 29.4 pg      MCHC 33.9 g/dL      RDW 13.3 %      RDW-SD 42.2 fl      MPV 9.6 fL      Platelets 290 10*3/mm3      Neutrophil % 72.6 %      Lymphocyte % 10.3 %      Monocyte % 9.1 %      Eosinophil % 7.1 %      Basophil % 0.5 %      Immature Grans % 0.4 %      Neutrophils, Absolute 8.49 10*3/mm3      Lymphocytes, Absolute 1.20 10*3/mm3      Monocytes, Absolute 1.06 10*3/mm3      Eosinophils, Absolute 0.83 10*3/mm3      Basophils, Absolute 0.06 10*3/mm3      Immature Grans, Absolute 0.05 10*3/mm3      nRBC 0.0 /100 WBC     Blood Culture - Blood, Arm, Left [[id number]] Collected: 11/06/21 1730    Specimen: Blood from Arm, Left Updated: 11/06/21 1739           Urine Culture   Date Value Ref Range Status   11/06/2021 Growth present, too young to evaluate  Preliminary        Imaging Results (Last 7 Days)     Procedure Component Value Units Date/Time    CT Abdomen Pelvis With Contrast [[id number]] Collected: 11/06/21 1949     Updated: 11/06/21 2000    Narrative:      CT ABDOMEN PELVIS W CONTRAST-     CLINICAL HISTORY: Abdomen pain     TECHNIQUE: Spiral CT images were acquired through the abdomen and pelvis  with IV contrast only and were reconstructed in 3 mm thick axial  slices.     Radiation dose reduction techniques were utilized, including automated  exposure control and exposure modulation based on body size.     COMPARISON: None     FINDINGS: The liver, spleen, pancreas, and adrenal glands appear within  normal limits. There is a 2.3 x 1.3 cm diameter nonobstructing calculus  in the posterior aspect of the upper pole of the right kidney. A few  tiny bilateral renal cysts are also noted. The kidneys are otherwise  unremarkable. There is a tiny hiatal hernia. The stomach and small and  large bowel are otherwise unremarkable. There is no evidence of  obstruction. No abnormal masses or fluid collections are identified  within the abdomen or pelvis. A suprapubic Skaggs catheter is in place.  The tip of the catheter and the inflated balloon of the catheter are  located within the prostatic urethra. There is moderate irregular  bladder wall thickening. 2 small bladder diverticula are also noted.       Impression:      Large nonobstructing right renal calculus as described. Tiny  bilateral renal cysts. Suprapubic Skaggs catheter in place with the tip  of the catheter and the inflated balloon of the catheter located within  the prostatic urethra. There is bladder wall hypertrophy and there are 2  small bladder diverticuli. Otherwise unremarkable CT scan of the abdomen  and pelvis.     This report was finalized on 11/6/2021 7:57 PM by Dr. Singh Perez M.D.                Inpatient Meds:   Current Meds:         Scheduled Meds:ipratropium-albuterol, 3 mL, Nebulization, 4x Daily - RT  piperacillin-tazobactam, 3.375 g, Intravenous, Q8H       Continuous Infusions:Pharmacy to Dose Zosyn,   sodium chloride, 75 mL/hr, Last Rate: 75 mL/hr (11/07/21 0057)       PRN Meds:.•  HYDROcodone-acetaminophen  •  Pharmacy to Dose Zosyn  •  [COMPLETED] Insert peripheral IV **AND** sodium chloride      IMPRESSION       Acute UTI   Urinary Retention with SPT   Hx of Prostate Cancer s/p IMRT 2014     Plan:    Obtain 18 fr charlton catheter to have SPT change on Monday   Follow [facility], continue antibiotics    Will need PSA once UTI results        I discussed the patients findings and my recommendations with patient.    Nadiya Velásquez, APRN  11/07/21  09:34 EST

## 2021-11-07 NOTE — PLAN OF CARE
Problem: Adult Inpatient Plan of Care  Goal: Plan of Care Review  Outcome: Ongoing, Progressing  Goal: Patient-Specific Goal (Individualized)  Outcome: Ongoing, Progressing  Goal: Absence of Hospital-Acquired Illness or Injury  Outcome: Ongoing, Progressing  Intervention: Identify and Manage Fall Risk  Recent Flowsheet Documentation  Taken 11/6/2021 2235 by Peace Castillo RN  Safety Promotion/Fall Prevention:   activity supervised   fall prevention program maintained   nonskid shoes/slippers when out of bed   safety round/check completed  Goal: Optimal Comfort and Wellbeing  Outcome: Ongoing, Progressing  Intervention: Provide Person-Centered Care  Recent Flowsheet Documentation  Taken 11/6/2021 2235 by Peace Castillo RN  Trust Relationship/Rapport:   care explained   choices provided   questions answered   questions encouraged   reassurance provided  Goal: Readiness for Transition of Care  Outcome: Ongoing, Progressing  Intervention: Mutually Develop Transition Plan  Recent Flowsheet Documentation  Taken 11/6/2021 2200 by Peace Castillo RN  Equipment Currently Used at Home:   slide board   wound care supplies  Transportation Anticipated: health plan transportation  Transportation Concerns: car, none  Patient/Family Anticipated Services at Transition:   Patient/Family Anticipates Transition to: long-term care facility     Problem: Fall Injury Risk  Goal: Absence of Fall and Fall-Related Injury  Outcome: Ongoing, Progressing  Intervention: Promote Injury-Free Environment  Recent Flowsheet Documentation  Taken 11/6/2021 2235 by Peace Castillo RN  Safety Promotion/Fall Prevention:   activity supervised   fall prevention program maintained   nonskid shoes/slippers when out of bed   safety round/check completed     Problem: Skin Injury Risk Increased  Goal: Skin Health and Integrity  Outcome: Ongoing, Progressing     Problem: Pain Acute  Goal: Optimal Pain Control  Outcome: Ongoing, Progressing     Problem: UTI  (Urinary Tract Infection)  Goal: Improved Infection Symptoms  Outcome: Ongoing, Progressing   Goal Outcome Evaluation:    Pt from [facility] with c/o lower abd pain and draining/oozing from suprapubic cath sight. Pt tested positive for UTI, abx and IVF per MDO. Pain per MAR. Consult to Uro placed to manage catheter. Pt with CP, limbs contracted, bottom excoriated (DTI/PI?) consult placed to wound care for input.

## 2021-11-07 NOTE — H&P
"History and physical    Primary care physician  Dr. OLIVAS    Chief complaint  Abdominal pain     History of present illness  80-year-old male with history of asthma hypertension hyperlipidemia cerebral palsy depression who also have a chronic suprapubic catheter in place with history of prostate cancer presented to [facility] emergency room with abdominal pain.  Patient awake and alert he stated that his suprapubic catheter is has been leaking.  Patient denies any fever cough chest pain shortness of breath abdominal pain nausea vomiting diarrhea.  Patient evaluated in ER found to have UTI with suprapubic catheter leakage admit for management.  Patient is a poor historian unable to give detail history most of the history obtained from the chart nursing staff old record.    PAST MEDICAL HISTORY  • Asthma     • Prostate cancer      • Depression     • Hyperlipidemia        PAST SURGICAL HISTORY     No past surgical history on file.     FAMILY HISTORY  No family history on file.     SOCIAL HISTORY                Socioeconomic History   • Marital status: Single   Tobacco Use   • Smoking status: Unknown If Ever Smoked   • Smokeless tobacco: Never Used   Substance and Sexual Activity   • Alcohol use: No   • Drug use: No         ALLERGIES  Patient has no known allergies.  Nursing home medications reviewed     REVIEW OF SYSTEMS  Limited due to poor historian     PHYSICAL EXAM  Blood pressure 142/56, pulse 87, temperature 97.4 °F (36.3 °C), temperature source Oral, resp. rate 16, height 182.9 cm (72\"), weight 82.6 kg (182 lb 3.2 oz), SpO2 95 %.    GENERAL: Awake and alert, nontoxic-appearing  HENT: nares patent  EYES: no scleral icterus  CV: regular rhythm, regular rate  RESPIRATORY: normal effort moving air bilaterally  ABDOMEN: soft, mild suprapubic tenderness to palpation.  There is a mature-looking suprapubic stoma with catheter in place.  There is no erythema, but it is tender to palpation around it with " foul-smelling purulent discharge.  Bowel sounds positive  MUSCULOSKELETAL:  Chronic contractures  NEURO: alert, appears to be at his neurologic baseline  SKIN: warm, dry     LAB RESULTS  Lab Results (last 24 hours)     Procedure Component Value Units Date/Time    Basic Metabolic Panel [[id number]]  (Abnormal) Collected: 11/07/21 0952    Specimen: Blood Updated: 11/07/21 1047     Glucose 113 mg/dL      BUN 12 mg/dL      Creatinine 0.61 mg/dL      Sodium 140 mmol/L      Potassium 3.7 mmol/L      Chloride 104 mmol/L      CO2 24.3 mmol/L      Calcium 8.6 mg/dL      eGFR Non African Amer 128 mL/min/1.73      BUN/Creatinine Ratio 19.7     Anion Gap 11.7 mmol/L     Narrative:      GFR Normal >60  Chronic Kidney Disease <60  Kidney Failure <15      Wound Culture - Wound, Abdominal Wall [[id number]] Collected: 11/06/21 2002    Specimen: Wound from Abdominal Wall Updated: 11/07/21 1019     Wound Culture Culture in progress     Gram Stain Moderate (3+) WBCs seen      Rare (1+) Gram positive cocci      Rare (1+) Gram positive bacilli      Occasional Gram negative bacilli    CBC & Differential [[id number]]  (Abnormal) Collected: 11/07/21 0952    Specimen: Blood Updated: 11/07/21 1006    Narrative:      The following orders were created for panel order CBC & Differential.  Procedure                               Abnormality         Status                     ---------                               -----------         ------                     CBC Auto Differential[[id number]]        Abnormal            Final result                 Please view results for these tests on the individual orders.    CBC Auto Differential [[id number]]  (Abnormal) Collected: 11/07/21 0952    Specimen: Blood Updated: 11/07/21 1006     WBC 11.84 10*3/mm3      RBC 4.71 10*6/mm3      Hemoglobin 13.5 g/dL      Hematocrit 41.5 %      MCV 88.1 fL      MCH 28.7 pg      MCHC 32.5 g/dL      RDW 13.1 %      RDW-SD 42.7 fl      MPV 9.2 fL      Platelets 279 10*3/mm3       Neutrophil % 69.7 %      Lymphocyte % 12.2 %      Monocyte % 10.1 %      Eosinophil % 6.9 %      Basophil % 0.5 %      Immature Grans % 0.6 %      Neutrophils, Absolute 8.26 10*3/mm3      Lymphocytes, Absolute 1.44 10*3/mm3      Monocytes, Absolute 1.19 10*3/mm3      Eosinophils, Absolute 0.82 10*3/mm3      Basophils, Absolute 0.06 10*3/mm3      Immature Grans, Absolute 0.07 10*3/mm3      nRBC 0.0 /100 WBC     Urine Culture - Urine, Urine, Catheter [[id number]]  (Normal) Collected: 11/06/21 1823    Specimen: Urine, Catheter Updated: 11/07/21 0627     Urine Culture Growth present, too young to evaluate    Blood Culture - Blood, Arm, Right [[id number]] Collected: 11/06/21 1730    Specimen: Blood from Arm, Right Updated: 11/06/21 2314    Urinalysis, Microscopic Only - Urine, Catheter [[id number]]  (Abnormal) Collected: 11/06/21 1823    Specimen: Urine, Catheter Updated: 11/06/21 1932     RBC, UA Too Numerous to Count /HPF      WBC, UA Too Numerous to Count /HPF      Bacteria, UA 4+ /HPF      Squamous Epithelial Cells, UA 7-12 /HPF      Hyaline Casts, UA None Seen /LPF      Methodology Manual Light Microscopy    Urinalysis With Culture If Indicated - Urine, Catheter [[id number]]  (Abnormal) Collected: 11/06/21 1823    Specimen: Urine, Catheter Updated: 11/06/21 1909     Color, UA Yellow     Appearance, UA Turbid     pH, UA 8.0     Specific Gravity, UA 1.017     Glucose, UA Negative     Ketones, UA Negative     Bilirubin, UA Negative     Blood, UA Large (3+)     Protein, UA >=300 mg/dL (3+)     Leuk Esterase, UA Large (3+)     Nitrite, UA Positive     Urobilinogen, UA 1.0 E.U./dL    Comprehensive Metabolic Panel [[id number]]  (Abnormal) Collected: 11/06/21 1730    Specimen: Blood Updated: 11/06/21 1822     Glucose 154 mg/dL      BUN 15 mg/dL      Creatinine 0.52 mg/dL      Sodium 141 mmol/L      Potassium 4.5 mmol/L      Chloride 104 mmol/L      CO2 26.4 mmol/L      Calcium 9.0 mg/dL      Total Protein 7.2 g/dL       Albumin 3.60 g/dL      ALT (SGPT) 7 U/L      AST (SGOT) 9 U/L      Alkaline Phosphatase 98 U/L      Total Bilirubin 0.5 mg/dL      eGFR Non African Amer >150 mL/min/1.73      Globulin 3.6 gm/dL      A/G Ratio 1.0 g/dL      BUN/Creatinine Ratio 28.8     Anion Gap 10.6 mmol/L     Narrative:      GFR Normal >60  Chronic Kidney Disease <60  Kidney Failure <15      COVID PRE-OP / PRE-PROCEDURE SCREENING ORDER (NO ISOLATION) - Swab, Nasopharynx [[id number]]  (Normal) Collected: 11/06/21 1731    Specimen: Swab from Nasopharynx Updated: 11/06/21 1814    Narrative:      The following orders were created for panel order COVID PRE-OP / PRE-PROCEDURE SCREENING ORDER (NO ISOLATION) - Swab, Nasopharynx.  Procedure                               Abnormality         Status                     ---------                               -----------         ------                     COVID-19,[facility]...[[id number]]  Normal              Final result                 Please view results for these tests on the individual orders.    COVID-19,[facility] CEPHEID/JANINE NP SWAB IN TRANSPORT MEDIA 8-12 HR TAT - Swab, Nasopharynx [[id number]]  (Normal) Collected: 11/06/21 1731    Specimen: Swab from Nasopharynx Updated: 11/06/21 1814     COVID19 Not Detected    Narrative:      Fact sheet for providers: https://www.fda.gov/media/702139/download    Fact sheet for patients: https://www.fda.gov/media/585974/download    Test performed by PCR.    Procalcitonin [[id number]]  (Normal) Collected: 11/06/21 1730    Specimen: Blood Updated: 11/06/21 1811     Procalcitonin 0.13 ng/mL     Narrative:      As a Marker for Sepsis (Non-Neonates):     1. <0.5 ng/mL represents a low risk of severe sepsis and/or septic shock.  2. >2 ng/mL represents a high risk of severe sepsis and/or septic shock.    As a Marker for Lower Respiratory Tract Infections that require antibiotic therapy:  PCT on Admission     Antibiotic Therapy             6-12 Hrs later  >0.5        "                   Strongly Recommended            >0.25 - <0.5             Recommended  0.1 - 0.25                  Discouraged                       Remeasure/reassess PCT  <0.1                         Strongly Discouraged         Remeasure/reassess PCT      As 28 day mortality risk marker: \"Change in Procalcitonin Result\" (>80% or <=80%) if Day 0 (or Day 1) and Day 4 values are available. Refer to [email or web address]/    Change in PCT <=80 %   A decrease of PCT levels below or equal to 80% defines a positive change in PCT test result representing a higher risk for 28-day all-cause mortality of patients diagnosed with severe sepsis or septic shock.    Change in PCT >80 %   A decrease of PCT levels of more than 80% defines a negative change in PCT result representing a lower risk for 28-day all-cause mortality of patients diagnosed with severe sepsis or septic shock.                Lactic Acid, Plasma [[id number]]  (Normal) Collected: 11/06/21 1730    Specimen: Blood Updated: 11/06/21 1802     Lactate 1.5 mmol/L     CBC & Differential [[id number]]  (Abnormal) Collected: 11/06/21 1730    Specimen: Blood Updated: 11/06/21 1744    Narrative:      The following orders were created for panel order CBC & Differential.  Procedure                               Abnormality         Status                     ---------                               -----------         ------                     CBC Auto Differential[[id number]]        Abnormal            Final result                 Please view results for these tests on the individual orders.    CBC Auto Differential [[id number]]  (Abnormal) Collected: 11/06/21 1730    Specimen: Blood Updated: 11/06/21 1744     WBC 11.69 10*3/mm3      RBC 4.83 10*6/mm3      Hemoglobin 14.2 g/dL      Hematocrit 41.9 %      MCV 86.7 fL      MCH 29.4 pg      MCHC 33.9 g/dL      RDW 13.3 %      RDW-SD 42.2 fl      MPV 9.6 fL      Platelets 290 10*3/mm3      Neutrophil % 72.6 %      " Lymphocyte % 10.3 %      Monocyte % 9.1 %      Eosinophil % 7.1 %      Basophil % 0.5 %      Immature Grans % 0.4 %      Neutrophils, Absolute 8.49 10*3/mm3      Lymphocytes, Absolute 1.20 10*3/mm3      Monocytes, Absolute 1.06 10*3/mm3      Eosinophils, Absolute 0.83 10*3/mm3      Basophils, Absolute 0.06 10*3/mm3      Immature Grans, Absolute 0.05 10*3/mm3      nRBC 0.0 /100 WBC     Blood Culture - Blood, Arm, Left [[id number]] Collected: 11/06/21 1730    Specimen: Blood from Arm, Left Updated: 11/06/21 1739        Imaging Results (Last 24 Hours)     Procedure Component Value Units Date/Time    CT Abdomen Pelvis With Contrast [[id number]] Collected: 11/06/21 1949     Updated: 11/06/21 2000    Narrative:      CT ABDOMEN PELVIS W CONTRAST-     CLINICAL HISTORY: Abdomen pain     TECHNIQUE: Spiral CT images were acquired through the abdomen and pelvis  with IV contrast only and were reconstructed in 3 mm thick axial slices.     Radiation dose reduction techniques were utilized, including automated  exposure control and exposure modulation based on body size.     COMPARISON: None     FINDINGS: The liver, spleen, pancreas, and adrenal glands appear within  normal limits. There is a 2.3 x 1.3 cm diameter nonobstructing calculus  in the posterior aspect of the upper pole of the right kidney. A few  tiny bilateral renal cysts are also noted. The kidneys are otherwise  unremarkable. There is a tiny hiatal hernia. The stomach and small and  large bowel are otherwise unremarkable. There is no evidence of  obstruction. No abnormal masses or fluid collections are identified  within the abdomen or pelvis. A suprapubic Skaggs catheter is in place.  The tip of the catheter and the inflated balloon of the catheter are  located within the prostatic urethra. There is moderate irregular  bladder wall thickening. 2 small bladder diverticula are also noted.       Impression:      Large nonobstructing right renal calculus as described.  Tiny  bilateral renal cysts. Suprapubic Skaggs catheter in place with the tip  of the catheter and the inflated balloon of the catheter located within  the prostatic urethra. There is bladder wall hypertrophy and there are 2  small bladder diverticuli. Otherwise unremarkable CT scan of the abdomen  and pelvis.     This report was finalized on 11/6/2021 7:57 PM by Dr. Singh Perez M.D.             Current Facility-Administered Medications:   •  budesonide-formoterol (SYMBICORT) 80-4.5 MCG/ACT inhaler 2 puff, 2 puff, Inhalation, BID - RT, Lázaro Hirsch MD  •  buPROPion SR (WELLBUTRIN SR) 12 hr tablet 100 mg, 100 mg, Oral, Once, Lázaro Hirsch MD  •  divalproex (DEPAKOTE) DR tablet 250 mg, 250 mg, Oral, BID, Lázaro Hirsch MD  •  HYDROcodone-acetaminophen (NORCO) 5-325 MG per tablet 1 tablet, 1 tablet, Oral, Q4H PRN, Lázaro Hirsch MD, 1 tablet at 11/07/21 0501  •  ipratropium-albuterol (DUO-NEB) nebulizer solution 3 mL, 3 mL, Nebulization, Q4H - RT, Lázaro Hirsch MD  •  pantoprazole (PROTONIX) EC tablet 20 mg, 20 mg, Oral, Daily, Lázaro Hirsch MD  •  piperacillin-tazobactam (ZOSYN) 3.375 g in iso-osmotic dextrose 50 ml (premix), 3.375 g, Intravenous, Q8H, Lázaro Hirsch MD, 3.375 g at 11/07/21 0830  •  polyethylene glycol (MIRALAX) packet 17 g, 17 g, Oral, Daily, Lázaro Hirsch MD  •  pravastatin (PRAVACHOL) tablet 40 mg, 40 mg, Oral, Nightly, Lázaro Hirsch MD  •  [COMPLETED] Insert peripheral IV, , , Once **AND** sodium chloride 0.9 % flush 10 mL, 10 mL, Intravenous, PRN, Emilio Banegas MD  •  sodium chloride 0.9 % infusion, 75 mL/hr, Intravenous, Continuous, Lázaro Hirsch MD, Last Rate: 75 mL/hr at 11/07/21 0057, 75 mL/hr at 11/07/21 0057  •  tamsulosin (FLOMAX) 24 hr capsule 0.4 mg, 0.4 mg, Oral, Nightly, Lázaro Hirsch MD     ASSESSMENT  Acute UTI  Suprapubic catheter leakage  History of urine retention  History of prostate cancer  Large nonobstructing right kidney  stone  Hypertension  Hyperlipidemia  Depression  Gastroesophageal reflux disease    PLAN  Admit  IVF  IV antibiotics  Urology consult  Continue home medications  Stress ulcer DVT prophylaxis  Supportive care  Patient is full code  Discussed with nursing staff  Follow closely further recommendation current hospital course    MINGO MARAVILLA MD

## 2021-11-07 NOTE — ED NOTES
Pt's facility paperwork shows that the patient's suprapubic catheter is 18 Fr with a 10cc balloon       Anival Garcia RN  11/06/21 2048

## 2021-11-07 NOTE — PROGRESS NOTES
"Pharmacokinetic Consult - Zosyn Dosing    Everett Emerson is a 80 y.o. male who is on day 1 pharmacy to dose Zosyn for UTI.  Pharmacy dosing Zosyn per Dr. Hirsch's request.   Other antimicrobials:     Relevant clinical data and objective history reviewed:  182.9 cm (72\")  82.6 kg (182 lb 3.2 oz)  Body mass index is 24.71 kg/m².     He has a past medical history of Allergic rhinitis, Anxiety, Asthma, BPH without obstruction/lower urinary tract symptoms, Cancer ([facility]), Cerebral palsy ([facility]), Constipation, COPD (chronic obstructive pulmonary disease) ([facility]), Depression, Diabetes mellitus ([facility]), GERD (gastroesophageal reflux disease), Hyperlipidemia, Hypertension, Insomnia, Mild cognitive impairment, Mood disorder ([facility]), Neuromuscular dysfunction of bladder, Open wound of penis, Osteoarthritis, Spasmodic torticollis, and Xerosis cutis.    Allergies as of 2021    (No Known Allergies)     Vital Signs (last 24 hours)          0700  0659  2312   Most Recent      Temp (°F)   97.8 -  98.6     97.8 (36.6) 2215    Heart Rate   57 -  84     70 2215    Resp   16 -  18     16 2215    BP   144/79 -  179/91     179/91 2215    SpO2 (%)   95 -  98     98 2135          Estimated Creatinine Clearance: 87.5 mL/min (A) (by C-G formula based on SCr of 0.52 mg/dL (L)).  Results from last 7 days   Lab Units 21  1730   CREATININE mg/dL 0.52*     Results from last 7 days   Lab Units 21  1730   WBC 10*3/mm3 11.69*     Baseline culture/source/susceptibility:            Blood cx pending      Urine cx pending    Imagin/6 - CT Abdomen/Pelvis  IMPRESSION:  Large nonobstructing right renal calculus as described. Tiny bilateral renal cysts. Suprapubic Skaggs catheter in place with the tip of the catheter and the inflated balloon of the catheter located within the prostatic urethra. There is bladder wall hypertrophy and there are 2 small bladder diverticuli. Otherwise " unremarkable CT scan of the abdomen and pelvis.    Labs:  Brief Urine Lab Results  (Last result in the past 365 days)        Color   Clarity   Blood   Leuk Est   Nitrite   Protein   CREAT   Urine HCG        11/06/21 1823 Yellow   Turbid   Large (3+)   Large (3+)   Positive   >=300 mg/dL (3+)                     Anti-Infectives (From admission, onward)      Ordered     Dose/Rate Route Frequency Start Stop    11/06/21 2311  piperacillin-tazobactam (ZOSYN) 3.375 g in iso-osmotic dextrose 50 ml (premix)        Ordering Provider: Lázaro Hirsch MD    3.375 g  over 4 Hours Intravenous Every 8 Hours 11/07/21 0000 11/13/21 2359    11/06/21 2259  Pharmacy to Dose Zosyn        Ordering Provider: Lázaro Hirsch MD     Does not apply Continuous PRN 11/06/21 2257 11/13/21 2256    11/06/21 1715  piperacillin-tazobactam (ZOSYN) 3.375 g in iso-osmotic dextrose 50 ml (premix)        Ordering Provider: Emilio Banegas MD    3.375 g  over 30 Minutes Intravenous Once 11/06/21 1717 11/06/21 1908             Assessment/Plan  Zosyn 3.375gm IV q8h as per [facility] dosing guidelines as estimated CrCl 87.5ml/min at this time    Pharmacy will continue to follow daily while on Zosyn and adjust as needed.     Thanks, Javon Armenta, PharmD

## 2021-11-08 LAB
ALBUMIN SERPL-MCNC: 2.9 G/DL (ref 3.5–5.2)
ALBUMIN/GLOB SERPL: 1 G/DL
ALP SERPL-CCNC: 79 U/L (ref 39–117)
ALT SERPL W P-5'-P-CCNC: 9 U/L (ref 1–41)
ANION GAP SERPL CALCULATED.3IONS-SCNC: 6.3 MMOL/L (ref 5–15)
AST SERPL-CCNC: 11 U/L (ref 1–40)
BACTERIA BLD CULT: ABNORMAL
BACTERIA BLD CULT: ABNORMAL
BACTERIA SPEC AEROBE CULT: ABNORMAL
BACTERIA SPEC AEROBE CULT: ABNORMAL
BACTERIA SPEC AEROBE CULT: NORMAL
BASOPHILS # BLD AUTO: 0.05 10*3/MM3 (ref 0–0.2)
BASOPHILS NFR BLD AUTO: 0.5 % (ref 0–1.5)
BILIRUB SERPL-MCNC: 0.4 MG/DL (ref 0–1.2)
BOTTLE TYPE: ABNORMAL
BUN SERPL-MCNC: 9 MG/DL (ref 8–23)
BUN/CREAT SERPL: 18.8 (ref 7–25)
CALCIUM SPEC-SCNC: 7.9 MG/DL (ref 8.6–10.5)
CHLORIDE SERPL-SCNC: 109 MMOL/L (ref 98–107)
CHOLEST SERPL-MCNC: 104 MG/DL (ref 0–200)
CO2 SERPL-SCNC: 25.7 MMOL/L (ref 22–29)
CREAT SERPL-MCNC: 0.48 MG/DL (ref 0.76–1.27)
DEPRECATED RDW RBC AUTO: 44.5 FL (ref 37–54)
EOSINOPHIL # BLD AUTO: 0.83 10*3/MM3 (ref 0–0.4)
EOSINOPHIL NFR BLD AUTO: 8.4 % (ref 0.3–6.2)
ERYTHROCYTE [DISTWIDTH] IN BLOOD BY AUTOMATED COUNT: 13.2 % (ref 12.3–15.4)
GFR SERPL CREATININE-BSD FRML MDRD: >150 ML/MIN/1.73
GLOBULIN UR ELPH-MCNC: 3 GM/DL
GLUCOSE SERPL-MCNC: 115 MG/DL (ref 65–99)
GRAM STN SPEC: ABNORMAL
HBA1C MFR BLD: 6.5 % (ref 4.8–5.6)
HCT VFR BLD AUTO: 38.4 % (ref 37.5–51)
HDLC SERPL-MCNC: 29 MG/DL (ref 40–60)
HGB BLD-MCNC: 11.9 G/DL (ref 13–17.7)
IMM GRANULOCYTES # BLD AUTO: 0.05 10*3/MM3 (ref 0–0.05)
IMM GRANULOCYTES NFR BLD AUTO: 0.5 % (ref 0–0.5)
LDLC SERPL CALC-MCNC: 59 MG/DL (ref 0–100)
LDLC/HDLC SERPL: 2.03 {RATIO}
LYMPHOCYTES # BLD AUTO: 1.5 10*3/MM3 (ref 0.7–3.1)
LYMPHOCYTES NFR BLD AUTO: 15.1 % (ref 19.6–45.3)
MCH RBC QN AUTO: 28.2 PG (ref 26.6–33)
MCHC RBC AUTO-ENTMCNC: 31 G/DL (ref 31.5–35.7)
MCV RBC AUTO: 91 FL (ref 79–97)
MONOCYTES # BLD AUTO: 1.04 10*3/MM3 (ref 0.1–0.9)
MONOCYTES NFR BLD AUTO: 10.5 % (ref 5–12)
NEUTROPHILS NFR BLD AUTO: 6.46 10*3/MM3 (ref 1.7–7)
NEUTROPHILS NFR BLD AUTO: 65 % (ref 42.7–76)
NRBC BLD AUTO-RTO: 0 /100 WBC (ref 0–0.2)
NT-PROBNP SERPL-MCNC: 61.7 PG/ML (ref 0–1800)
PLATELET # BLD AUTO: 263 10*3/MM3 (ref 140–450)
PMV BLD AUTO: 9.7 FL (ref 6–12)
POTASSIUM SERPL-SCNC: 3.5 MMOL/L (ref 3.5–5.2)
PROT SERPL-MCNC: 5.9 G/DL (ref 6–8.5)
RBC # BLD AUTO: 4.22 10*6/MM3 (ref 4.14–5.8)
SODIUM SERPL-SCNC: 141 MMOL/L (ref 136–145)
TRIGL SERPL-MCNC: 80 MG/DL (ref 0–150)
TSH SERPL DL<=0.05 MIU/L-ACNC: 4.07 UIU/ML (ref 0.27–4.2)
VLDLC SERPL-MCNC: 16 MG/DL (ref 5–40)
WBC # BLD AUTO: 9.93 10*3/MM3 (ref 3.4–10.8)

## 2021-11-08 PROCEDURE — 83880 ASSAY OF NATRIURETIC PEPTIDE: CPT | Performed by: HOSPITALIST

## 2021-11-08 PROCEDURE — 85025 COMPLETE CBC W/AUTO DIFF WBC: CPT | Performed by: HOSPITALIST

## 2021-11-08 PROCEDURE — 94799 UNLISTED PULMONARY SVC/PX: CPT

## 2021-11-08 PROCEDURE — 25010000002 SODIUM CHLORIDE 0.9 % WITH KCL 20 MEQ 20-0.9 MEQ/L-% SOLUTION: Performed by: HOSPITALIST

## 2021-11-08 PROCEDURE — 80061 LIPID PANEL: CPT | Performed by: HOSPITALIST

## 2021-11-08 PROCEDURE — 25010000002 PIPERACILLIN SOD-TAZOBACTAM PER 1 G: Performed by: HOSPITALIST

## 2021-11-08 PROCEDURE — 84443 ASSAY THYROID STIM HORMONE: CPT | Performed by: HOSPITALIST

## 2021-11-08 PROCEDURE — 25010000002 VANCOMYCIN 10 G RECONSTITUTED SOLUTION: Performed by: HOSPITALIST

## 2021-11-08 PROCEDURE — 0T2BX0Z CHANGE DRAINAGE DEVICE IN BLADDER, EXTERNAL APPROACH: ICD-10-PCS | Performed by: UROLOGY

## 2021-11-08 PROCEDURE — 80053 COMPREHEN METABOLIC PANEL: CPT | Performed by: HOSPITALIST

## 2021-11-08 PROCEDURE — 83036 HEMOGLOBIN GLYCOSYLATED A1C: CPT | Performed by: HOSPITALIST

## 2021-11-08 RX ORDER — PRAVASTATIN SODIUM 10 MG
10 TABLET ORAL NIGHTLY
Status: DISCONTINUED | OUTPATIENT
Start: 2021-11-08 | End: 2021-11-10 | Stop reason: HOSPADM

## 2021-11-08 RX ORDER — IPRATROPIUM BROMIDE AND ALBUTEROL SULFATE 2.5; .5 MG/3ML; MG/3ML
3 SOLUTION RESPIRATORY (INHALATION) EVERY 4 HOURS PRN
Status: DISCONTINUED | OUTPATIENT
Start: 2021-11-08 | End: 2021-11-10

## 2021-11-08 RX ORDER — SODIUM CHLORIDE AND POTASSIUM CHLORIDE 150; 900 MG/100ML; MG/100ML
50 INJECTION, SOLUTION INTRAVENOUS CONTINUOUS
Status: DISCONTINUED | OUTPATIENT
Start: 2021-11-08 | End: 2021-11-09

## 2021-11-08 RX ADMIN — DIVALPROEX SODIUM 250 MG: 250 TABLET, DELAYED RELEASE ORAL at 08:51

## 2021-11-08 RX ADMIN — PANTOPRAZOLE SODIUM 40 MG: 40 TABLET, DELAYED RELEASE ORAL at 08:51

## 2021-11-08 RX ADMIN — TAMSULOSIN HYDROCHLORIDE 0.4 MG: 0.4 CAPSULE ORAL at 20:08

## 2021-11-08 RX ADMIN — IPRATROPIUM BROMIDE AND ALBUTEROL SULFATE 3 ML: 2.5; .5 SOLUTION RESPIRATORY (INHALATION) at 11:26

## 2021-11-08 RX ADMIN — DIVALPROEX SODIUM 250 MG: 250 TABLET, DELAYED RELEASE ORAL at 20:08

## 2021-11-08 RX ADMIN — VANCOMYCIN HYDROCHLORIDE 1750 MG: 10 INJECTION, POWDER, LYOPHILIZED, FOR SOLUTION INTRAVENOUS at 11:59

## 2021-11-08 RX ADMIN — PRAVASTATIN SODIUM 10 MG: 10 TABLET ORAL at 20:08

## 2021-11-08 RX ADMIN — BUDESONIDE AND FORMOTEROL FUMARATE DIHYDRATE 2 PUFF: 80; 4.5 AEROSOL RESPIRATORY (INHALATION) at 22:12

## 2021-11-08 RX ADMIN — SODIUM CHLORIDE 75 ML/HR: 9 INJECTION, SOLUTION INTRAVENOUS at 08:51

## 2021-11-08 RX ADMIN — POTASSIUM CHLORIDE AND SODIUM CHLORIDE 50 ML/HR: 900; 150 INJECTION, SOLUTION INTRAVENOUS at 18:09

## 2021-11-08 RX ADMIN — POLYETHYLENE GLYCOL 3350 17 G: 17 POWDER, FOR SOLUTION ORAL at 08:51

## 2021-11-08 RX ADMIN — TAZOBACTAM SODIUM AND PIPERACILLIN SODIUM 3.38 G: 375; 3 INJECTION, SOLUTION INTRAVENOUS at 08:51

## 2021-11-08 RX ADMIN — HYDROCODONE BITARTRATE AND ACETAMINOPHEN 1 TABLET: 5; 325 TABLET ORAL at 05:32

## 2021-11-08 RX ADMIN — TAZOBACTAM SODIUM AND PIPERACILLIN SODIUM 3.38 G: 375; 3 INJECTION, SOLUTION INTRAVENOUS at 16:26

## 2021-11-08 RX ADMIN — VANCOMYCIN HYDROCHLORIDE 1250 MG: 10 INJECTION, POWDER, LYOPHILIZED, FOR SOLUTION INTRAVENOUS at 23:13

## 2021-11-08 RX ADMIN — BUDESONIDE AND FORMOTEROL FUMARATE DIHYDRATE 2 PUFF: 80; 4.5 AEROSOL RESPIRATORY (INHALATION) at 07:34

## 2021-11-08 NOTE — PROGRESS NOTES
"Daily progress note    Chief complaint  Doing same  No new complaints  Denies any abdominal pain nausea vomiting  Awake and alert and follow commands    History of present illness  80-year-old male with history of asthma hypertension hyperlipidemia cerebral palsy depression who also have a chronic suprapubic catheter in place with history of prostate cancer presented to [facility] emergency room with abdominal pain.  Patient awake and alert he stated that his suprapubic catheter is has been leaking.  Patient denies any fever cough chest pain shortness of breath abdominal pain nausea vomiting diarrhea.  Patient evaluated in ER found to have UTI with suprapubic catheter leakage admit for management.  Patient is a poor historian unable to give detail history most of the history obtained from the chart nursing staff old record.     REVIEW OF SYSTEMS  Limited due to poor historian     PHYSICAL EXAM  Blood pressure 159/64, pulse 88, temperature 97.4 °F (36.3 °C), temperature source Oral, resp. rate 14, height 182.9 cm (72\"), weight 82.6 kg (182 lb 3.2 oz), SpO2 96 %.    GENERAL: Awake and alert, nontoxic-appearing  HENT: nares patent  EYES: no scleral icterus  CV: regular rhythm, regular rate  RESPIRATORY: normal effort moving air bilaterally  ABDOMEN: soft, mild suprapubic tenderness to palpation.  There is a mature-looking suprapubic stoma with catheter in place.  There is no erythema, but it is tender to palpation around it with foul-smelling purulent discharge.  Bowel sounds positive  MUSCULOSKELETAL:  Chronic contractures  NEURO: alert, appears to be at his neurologic baseline  SKIN: warm, dry     LAB RESULTS  Lab Results (last 24 hours)     Procedure Component Value Units Date/Time    Blood Culture ID, PCR - Blood, Arm, Right [[id number]]  (Abnormal) Collected: 11/06/21 1730    Specimen: Blood from Arm, Right Updated: 11/08/21 0954     BCID, PCR Staph spp, not aureus or lugdunesis. Identification by BCID2 " PCR.    Wound Culture - Wound, Abdominal Wall [[id number]]  (Abnormal) Collected: 11/06/21 2002    Specimen: Wound from Abdominal Wall Updated: 11/08/21 0940     Wound Culture Scant growth (1+) Mixed Gram Negative Yuli      Light growth (2+) Normal Skin Yuli     Gram Stain Moderate (3+) WBCs seen      Rare (1+) Gram positive cocci      Rare (1+) Gram positive bacilli      Occasional Gram negative bacilli    Blood Culture - Blood, Arm, Right [[id number]]  (Abnormal) Collected: 11/06/21 1730    Specimen: Blood from Arm, Right Updated: 11/08/21 0839     Blood Culture Abnormal Stain     Gram Stain Aerobic Bottle Gram positive cocci in clusters    TSH [[id number]]  (Normal) Collected: 11/08/21 0700    Specimen: Blood Updated: 11/08/21 0801     TSH 4.070 uIU/mL     BNP [[id number]]  (Normal) Collected: 11/08/21 0700    Specimen: Blood Updated: 11/08/21 0800     proBNP 61.7 pg/mL     Narrative:      Among patients with dyspnea, NT-proBNP is highly sensitive for the detection of acute congestive heart failure. In addition NT-proBNP of <300 pg/ml effectively rules out acute congestive heart failure with 99% negative predictive value.    Results may be falsely decreased if patient taking Biotin.      Comprehensive Metabolic Panel [[id number]]  (Abnormal) Collected: 11/08/21 0700    Specimen: Blood Updated: 11/08/21 0756     Glucose 115 mg/dL      BUN 9 mg/dL      Creatinine 0.48 mg/dL      Sodium 141 mmol/L      Potassium 3.5 mmol/L      Chloride 109 mmol/L      CO2 25.7 mmol/L      Calcium 7.9 mg/dL      Total Protein 5.9 g/dL      Albumin 2.90 g/dL      ALT (SGPT) 9 U/L      AST (SGOT) 11 U/L      Alkaline Phosphatase 79 U/L      Total Bilirubin 0.4 mg/dL      eGFR Non African Amer >150 mL/min/1.73      Globulin 3.0 gm/dL      A/G Ratio 1.0 g/dL      BUN/Creatinine Ratio 18.8     Anion Gap 6.3 mmol/L     Narrative:      GFR Normal >60  Chronic Kidney Disease <60  Kidney Failure <15      Lipid Panel [[id number]]  (Abnormal)  Collected: 11/08/21 0700    Specimen: Blood Updated: 11/08/21 0756     Total Cholesterol 104 mg/dL      Triglycerides 80 mg/dL      HDL Cholesterol 29 mg/dL      LDL Cholesterol  59 mg/dL      VLDL Cholesterol 16 mg/dL      LDL/HDL Ratio 2.03    Narrative:      Cholesterol Reference Ranges  (U.S. Department of Health and Human Services ATP III Classifications)    Desirable          <200 mg/dL  Borderline High    200-239 mg/dL  High Risk          >240 mg/dL      Triglyceride Reference Ranges  (U.S. Department of Health and Human Services ATP III Classifications)    Normal           <150 mg/dL  Borderline High  150-199 mg/dL  High             200-499 mg/dL  Very High        >500 mg/dL    HDL Reference Ranges  (U.S. Department of Health and Human Services ATP III Classifcations)    Low     <40 mg/dl (major risk factor for CHD)  High    >60 mg/dl ('negative' risk factor for CHD)        LDL Reference Ranges  (U.S. Department of Health and Human Services ATP III Classifcations)    Optimal          <100 mg/dL  Near Optimal     100-129 mg/dL  Borderline High  130-159 mg/dL  High             160-189 mg/dL  Very High        >189 mg/dL    Hemoglobin A1c [[id number]]  (Abnormal) Collected: 11/08/21 0700    Specimen: Blood Updated: 11/08/21 0741     Hemoglobin A1C 6.50 %     Narrative:      Hemoglobin A1C Ranges:    Increased Risk for Diabetes  5.7% to 6.4%  Diabetes                     >= 6.5%  Diabetic Goal                < 7.0%    Blood Culture - Blood, Arm, Left [[id number]]  (Abnormal) Collected: 11/06/21 1730    Specimen: Blood from Arm, Left Updated: 11/08/21 0737     Blood Culture Growth present, too young to evaluate     Gram Stain Anaerobic Bottle Gram positive cocci in clusters    CBC & Differential [[id number]]  (Abnormal) Collected: 11/08/21 0700    Specimen: Blood Updated: 11/08/21 0732    Narrative:      The following orders were created for panel order CBC & Differential.  Procedure                                Abnormality         Status                     ---------                               -----------         ------                     CBC Auto Differential[[id number]]        Abnormal            Final result                 Please view results for these tests on the individual orders.    CBC Auto Differential [[id number]]  (Abnormal) Collected: 11/08/21 0700    Specimen: Blood Updated: 11/08/21 0732     WBC 9.93 10*3/mm3      RBC 4.22 10*6/mm3      Hemoglobin 11.9 g/dL      Hematocrit 38.4 %      MCV 91.0 fL      MCH 28.2 pg      MCHC 31.0 g/dL      RDW 13.2 %      RDW-SD 44.5 fl      MPV 9.7 fL      Platelets 263 10*3/mm3      Neutrophil % 65.0 %      Lymphocyte % 15.1 %      Monocyte % 10.5 %      Eosinophil % 8.4 %      Basophil % 0.5 %      Immature Grans % 0.5 %      Neutrophils, Absolute 6.46 10*3/mm3      Lymphocytes, Absolute 1.50 10*3/mm3      Monocytes, Absolute 1.04 10*3/mm3      Eosinophils, Absolute 0.83 10*3/mm3      Basophils, Absolute 0.05 10*3/mm3      Immature Grans, Absolute 0.05 10*3/mm3      nRBC 0.0 /100 WBC     Urine Culture - Urine, Urine, Catheter [[id number]] Collected: 11/06/21 1823    Specimen: Urine, Catheter Updated: 11/08/21 0711     Urine Culture >100,000 CFU/mL Mixed Bong Isolated    Narrative:      Specimen contains mixed organisms of questionable pathogenicity which indicates contamination with commensal bong.  Further identification is unlikely to provide clinically useful information.  Suggest recollection.    Blood Culture ID, PCR - Blood, Arm, Left [[id number]]  (Abnormal) Collected: 11/06/21 1730    Specimen: Blood from Arm, Left Updated: 11/08/21 0637     BCID, PCR Staph spp, not aureus or lugdunesis. Identification by BCID2 PCR.     BOTTLE TYPE Anaerobic Bottle        Imaging Results (Last 24 Hours)     ** No results found for the last 24 hours. **          Current Facility-Administered Medications:   •  budesonide-formoterol (SYMBICORT) 80-4.5 MCG/ACT inhaler 2 puff, 2 puff,  Inhalation, BID - RT, Lázaro Hirsch MD, 2 puff at 11/08/21 0734  •  divalproex (DEPAKOTE) DR tablet 250 mg, 250 mg, Oral, BID, Lázaro Hirsch MD, 250 mg at 11/08/21 0851  •  HYDROcodone-acetaminophen (NORCO) 5-325 MG per tablet 1 tablet, 1 tablet, Oral, Q4H PRN, Lázaro Hirsch MD, 1 tablet at 11/08/21 0532  •  ipratropium-albuterol (DUO-NEB) nebulizer solution 3 mL, 3 mL, Nebulization, Q4H - RT, Lázaro Hirsch MD, 3 mL at 11/08/21 1126  •  pantoprazole (PROTONIX) EC tablet 40 mg, 40 mg, Oral, Daily, Lázaro Hirsch MD, 40 mg at 11/08/21 0851  •  Pharmacy to dose vancomycin, , Does not apply, Continuous PRN, Lázaro Hirsch MD  •  piperacillin-tazobactam (ZOSYN) 3.375 g in iso-osmotic dextrose 50 ml (premix), 3.375 g, Intravenous, Q8H, Lázaro Hirsch MD, 3.375 g at 11/08/21 0851  •  polyethylene glycol (MIRALAX) packet 17 g, 17 g, Oral, Daily, Lázaro Hirsch MD, 17 g at 11/08/21 0851  •  pravastatin (PRAVACHOL) tablet 40 mg, 40 mg, Oral, Nightly, Lázaro Hirsch MD, 40 mg at 11/07/21 2045  •  [COMPLETED] Insert peripheral IV, , , Once **AND** sodium chloride 0.9 % flush 10 mL, 10 mL, Intravenous, PRN, Emilio Banegas MD  •  sodium chloride 0.9 % infusion, 75 mL/hr, Intravenous, Continuous, Lázaro Hirsch MD, Last Rate: 75 mL/hr at 11/08/21 0851, 75 mL/hr at 11/08/21 0851  •  tamsulosin (FLOMAX) 24 hr capsule 0.4 mg, 0.4 mg, Oral, Nightly, Lázaro Hirsch MD, 0.4 mg at 11/07/21 2045  •  vancomycin 1250 mg/250 mL 0.9% NS IVPB ([facility]), 1,250 mg, Intravenous, Q12H, Lázaro Hirsch MD     ASSESSMENT  GPC bacteremia with sepsis  Acute UTI  Suprapubic catheter leakage  History of urine retention  History of prostate cancer  Large nonobstructing right kidney stone  Hypertension  Hyperlipidemia  Depression  Gastroesophageal reflux disease    PLAN  CPM  IVF  Continue Zosyn and IV vancomycin  Infectious disease consult  Urology consult appreciated  Continue home medications  Stress ulcer DVT prophylaxis  Supportive care  PT/OT  Discussed with  nursing staff  Follow closely further recommendation current hospital course    MINGO MARAVILLA MD

## 2021-11-08 NOTE — PROGRESS NOTES
"[facility]  Clinical Pharmacy Department     Vancomycin Pharmacokinetic Initial Consult Note    Everett Emerson is a 80 y.o. male who is on day 1/7 of pharmacy to dose vancomycin.    MRSA PCR performed:   Target: -600 mg/L.hr or Dose by Levels  Indication: bacteremia   Culture/Source: 11/7 2/2 bcx staph not aureus   Previous Dose Given: none   Vancomycin Dose:   1750 mg x1 then 1250 mg q12 for  mg/L.hr   Planned Duration of Therapy: 7d  Consulting Provider: Joycelyn   Other Antimicrobials: zosyn     Vitals/Labs  Ht: 182.9 cm (72\"); Wt: 82.6 kg (182 lb 3.2 oz)  Temp Readings from Last 1 Encounters:   11/08/21 97.4 °F (36.3 °C) (Oral)    Estimated Creatinine Clearance: 87.5 mL/min (A) (by C-G formula based on SCr of 0.48 mg/dL (L)).     Results from last 7 days   Lab Units 11/08/21  0700 11/07/21  0952 11/06/21  1730   CREATININE mg/dL 0.48* 0.61* 0.52*   WBC 10*3/mm3 9.93 11.84* 11.69*     Assessment/Plan:    Vancomycin 1750 mg x1 then 1250 mg q12 for  mg/L.hr   Vancomycin trough 11/9 1100    Pharmacy will follow patient's kidney function and will adjust doses and obtain levels as necessary.Thank you for involving pharmacy in this patient's care. Please contact pharmacy with any questions or concerns.                           Rox Vo [facility]  Clinical Pharmacist  11/08/21     "

## 2021-11-08 NOTE — NURSING NOTE
CWON note: pt seen for evaluation of skin issues POA. Pt is alert and oriented, but has very limited mobility, currently on bariatric low air loss mattress from [employer] for adequate pressure redistribution and control of microclimate.    Suprapubic catheter was exchanged by urology today. There is hypertrophic tissue at 3 o'clock, which could be treated with silver nitrate, this is causing small amount of serosanguinous drainage from site.   Underside of penis is filleted open from previous long term charlton catheter usage. Wound bed is pink with moderate amount of yellow purulent drainage noted. Begin Opticel Ag dressing daily.   Perineal and buttocks has extensive IAD with yeast component. Begin Magic Barrier cream to the area TID. Prevention standing orders implemented. Discussed all with RNPortia

## 2021-11-08 NOTE — PROGRESS NOTES
Clinical Pharmacy Services: Medication History    Everett Emerson is a 80 y.o. male presenting to [facility] for Acute UTI [N39.0]     He  has a past medical history of Allergic rhinitis, Anxiety, Asthma, BPH without obstruction/lower urinary tract symptoms, Cancer ([facility]), Cerebral palsy ([facility]), Constipation, COPD (chronic obstructive pulmonary disease) ([facility]), Depression, Diabetes mellitus ([facility]), GERD (gastroesophageal reflux disease), Hyperlipidemia, Hypertension, Insomnia, Mild cognitive impairment, Mood disorder ([facility]), Neuromuscular dysfunction of bladder, Open wound of penis, Osteoarthritis, Spasmodic torticollis, and Xerosis cutis.    Allergies as of 11/06/2021   • (No Known Allergies)       Medication information was obtained from: [facility] [employer]   Pharmacy and Phone Number:   No Pharmacies Listed      Prior to Admission Medications     Prescriptions Last Dose Informant Patient Reported? Taking?    alfuzosin (UROXATRAL) 10 MG 24 hr tablet  Nursing Home Yes Yes    Take 10 mg by mouth Every Night.    Artificial Tear Solution (GenTeal Tears) 0.1-0.2-0.3 % solution  Nursing Home Yes Yes    Apply 2 drops to eye(s) as directed by provider 2 (Two) Times a Day.    baclofen (LIORESAL) 20 MG tablet  Nursing Home Yes Yes    Take 20 mg by mouth 3 (Three) Times a Day.    buPROPion SR (WELLBUTRIN SR) 100 MG 12 hr tablet  Nursing Home Yes Yes    Take 100 mg by mouth 1 (One) Time.    Calcium Polycarbophil 625 MG chewable tablet  Nursing Home Yes Yes    Chew. 1 tablet four times a day    CLOTRIMAZOLE-BETAMETHASONE EX  Nursing Home Yes Yes    Apply  topically 3 (Three) Times a Day. Apply to buttocks TID    divalproex (DEPAKOTE) 250 MG DR tablet  Nursing Home Yes Yes    Take 250 mg by mouth 2 (Two) Times a Day.    Fluticasone Furoate-Vilanterol (Breo Ellipta) 100-25 MCG/INH inhaler  Nursing Home Yes Yes    Inhale 1 puff Daily.    Ipratropium Bromide HFA (ATROVENT HFA IN)  Nursing Home Yes Yes    Inhale 2 puffs 2 (Two) Times a Day.     ipratropium-albuterol (DUO-NEB) 0.5-2.5 mg/3 ml nebulizer  Nursing Home Yes Yes    Take 3 mL by nebulization 4 (Four) Times a Day.    Loratadine 10 MG capsule  Nursing Home Yes Yes    Take 10 mg by mouth Daily.    metoprolol tartrate (LOPRESSOR) 25 MG tablet  Nursing Home Yes Yes    Take 25 mg by mouth 2 (Two) Times a Day.    miconazole (Mae Antifungal) 2 % cream  Nursing Home Yes Yes    Apply 1 application topically to the appropriate area as directed 2 (Two) Times a Day. Apply to buttocks q shift    mupirocin (BACTROBAN) 2 % cream  Nursing Home Yes Yes    Apply 1 application topically to the appropriate area as directed 2 (Two) Times a Day.    mupirocin (BACTROBAN) 2 % ointment  Nursing Home Yes Yes    Apply 1 application topically to the appropriate area as directed 3 (Three) Times a Day.    pantoprazole (PROTONIX) 20 MG EC tablet  Nursing Home Yes Yes    Take 20 mg by mouth Daily.    POLYETHYLENE GLYCOL 3350 PO  Nursing Home Yes Yes    Take 1 packet by mouth Daily.    pravastatin (PRAVACHOL) 20 MG tablet  Nursing Home Yes Yes    Take 40 mg by mouth Every Night.    simethicone (MYLICON) 80 MG chewable tablet  Nursing Home Yes Yes    Chew 80 mg 4 (Four) Times a Day Before Meals & at Bedtime.    Skin Protectants, Misc. (eucerin) cream  Nursing Home Yes Yes    Apply 1 application topically to the appropriate area as directed 2 (Two) Times a Day.    tuberculin (Tubersol) 5 UNIT/0.1ML injection  Nursing Home Yes Yes    Inject  into the appropriate area of the skin as directed by provider 1 (One) Time.    zinc oxide 20 % ointment  Nursing Home Yes Yes    Apply 1 application topically to the appropriate area as directed 2 (Two) Times a Day.            Medication notes: added metoprolol 25 bid    This medication list is complete to the best of my knowledge as of 11/8/2021    Please call if questions.    Rox Vo, PharmD, BCPS  11/8/2021 10:12 EST

## 2021-11-08 NOTE — PAYOR COMM NOTE
"Everett Emerson (80 y.o. Male)       ATTN: REQUESTING INPATIENT AUTHORIZATION: REF# [id number]    PLEASE REPLY TO UR DEPT: [phone],  [phone]    DX: N39.0     MD: MINGO HIRSCH [id number]                Date of Birth Social Security Number Address Home Phone MRN    1940  [id number] [facility] [phone] [id number]    Church Marital Status             [facility] Single       Admission Date Admission Type Admitting Provider Attending Provider Department, Room/Bed    11/6/21 Emergency Mingo Hirsch MD Ahmed, Aftab, MD [address], [address]/1    Discharge Date Discharge Disposition Discharge Destination                         Attending Provider: Mingo Hirsch MD    Allergies: No Known Allergies    Isolation: None   Infection: None   Code Status: CPR   Advance Care Planning Activity    Ht: 182.9 cm (72\")   Wt: 82.6 kg (182 lb 3.2 oz)    Admission Cmt: None   Principal Problem: None                Active Insurance as of 11/6/2021     Primary Coverage     Payor Plan Insurance Group Employer/Plan Group    S[employer] MEDICARE ADVANTAGE SIGNATURE ADVANTAGE      Payor Plan Address Payor Plan Phone Number Payor Plan Fax Number Effective Dates    [address] [address] [phone]  4/1/2019 - None Entered    [facility] [id number]       Subscriber Name Subscriber Birth Date Member ID       EVERETT EMERSON 1940 [id number]           Secondary Coverage     Payor Plan Insurance Group Employer/Plan Group    [place]      Payor Plan Address Payor Plan Phone Number Payor Plan Fax Number Effective Dates    [address] [phone]  1/19/2017 - None Entered    [place] [id number]       Subscriber Name Subscriber Birth Date Member ID       EVERETT EMERSON 1940 [id number]                 Emergency Contacts      (Rel.) Home Phone Work Phone Mobile Phone    Misty Stuart (Other) [phone] -- --    VAUGHN EMERSON (Relative) " "[phone] -- [phone]               History & Physical      Lázaro Hirsch MD at 11/07/21 1240          History and physical    Primary care physician  Dr. OLIVAS    Chief complaint  Abdominal pain     History of present illness  80-year-old male with history of asthma hypertension hyperlipidemia cerebral palsy depression who also have a chronic suprapubic catheter in place with history of prostate cancer presented to [facility] emergency room with abdominal pain.  Patient awake and alert he stated that his suprapubic catheter is has been leaking.  Patient denies any fever cough chest pain shortness of breath abdominal pain nausea vomiting diarrhea.  Patient evaluated in ER found to have UTI with suprapubic catheter leakage admit for management.  Patient is a poor historian unable to give detail history most of the history obtained from the chart nursing staff old record.    PAST MEDICAL HISTORY  • Asthma     • Prostate cancer      • Depression     • Hyperlipidemia        PAST SURGICAL HISTORY     No past surgical history on file.     FAMILY HISTORY  No family history on file.     SOCIAL HISTORY                Socioeconomic History   • Marital status: Single   Tobacco Use   • Smoking status: Unknown If Ever Smoked   • Smokeless tobacco: Never Used   Substance and Sexual Activity   • Alcohol use: No   • Drug use: No         ALLERGIES  Patient has no known allergies.  Nursing home medications reviewed     REVIEW OF SYSTEMS  Limited due to poor historian     PHYSICAL EXAM  Blood pressure 142/56, pulse 87, temperature 97.4 °F (36.3 °C), temperature source Oral, resp. rate 16, height 182.9 cm (72\"), weight 82.6 kg (182 lb 3.2 oz), SpO2 95 %.    GENERAL: Awake and alert, nontoxic-appearing  HENT: nares patent  EYES: no scleral icterus  CV: regular rhythm, regular rate  RESPIRATORY: normal effort moving air bilaterally  ABDOMEN: soft, mild suprapubic tenderness to palpation.  There is a mature-looking " suprapubic stoma with catheter in place.  There is no erythema, but it is tender to palpation around it with foul-smelling purulent discharge.  Bowel sounds positive  MUSCULOSKELETAL:  Chronic contractures  NEURO: alert, appears to be at his neurologic baseline  SKIN: warm, dry     LAB RESULTS  Lab Results (last 24 hours)     Procedure Component Value Units Date/Time    Basic Metabolic Panel [[id number]]  (Abnormal) Collected: 11/07/21 0952    Specimen: Blood Updated: 11/07/21 1047     Glucose 113 mg/dL      BUN 12 mg/dL      Creatinine 0.61 mg/dL      Sodium 140 mmol/L      Potassium 3.7 mmol/L      Chloride 104 mmol/L      CO2 24.3 mmol/L      Calcium 8.6 mg/dL      eGFR Non African Amer 128 mL/min/1.73      BUN/Creatinine Ratio 19.7     Anion Gap 11.7 mmol/L     Narrative:      GFR Normal >60  Chronic Kidney Disease <60  Kidney Failure <15      Wound Culture - Wound, Abdominal Wall [[id number]] Collected: 11/06/21 2002    Specimen: Wound from Abdominal Wall Updated: 11/07/21 1019     Wound Culture Culture in progress     Gram Stain Moderate (3+) WBCs seen      Rare (1+) Gram positive cocci      Rare (1+) Gram positive bacilli      Occasional Gram negative bacilli    CBC & Differential [[id number]]  (Abnormal) Collected: 11/07/21 0952    Specimen: Blood Updated: 11/07/21 1006    Narrative:      The following orders were created for panel order CBC & Differential.  Procedure                               Abnormality         Status                     ---------                               -----------         ------                     CBC Auto Differential[[id number]]        Abnormal            Final result                 Please view results for these tests on the individual orders.    CBC Auto Differential [[id number]]  (Abnormal) Collected: 11/07/21 0952    Specimen: Blood Updated: 11/07/21 1006     WBC 11.84 10*3/mm3      RBC 4.71 10*6/mm3      Hemoglobin 13.5 g/dL      Hematocrit 41.5 %      MCV 88.1 fL      MCH  28.7 pg      MCHC 32.5 g/dL      RDW 13.1 %      RDW-SD 42.7 fl      MPV 9.2 fL      Platelets 279 10*3/mm3      Neutrophil % 69.7 %      Lymphocyte % 12.2 %      Monocyte % 10.1 %      Eosinophil % 6.9 %      Basophil % 0.5 %      Immature Grans % 0.6 %      Neutrophils, Absolute 8.26 10*3/mm3      Lymphocytes, Absolute 1.44 10*3/mm3      Monocytes, Absolute 1.19 10*3/mm3      Eosinophils, Absolute 0.82 10*3/mm3      Basophils, Absolute 0.06 10*3/mm3      Immature Grans, Absolute 0.07 10*3/mm3      nRBC 0.0 /100 WBC     Urine Culture - Urine, Urine, Catheter [[id number]]  (Normal) Collected: 11/06/21 1823    Specimen: Urine, Catheter Updated: 11/07/21 0627     Urine Culture Growth present, too young to evaluate    Blood Culture - Blood, Arm, Right [[id number]] Collected: 11/06/21 1730    Specimen: Blood from Arm, Right Updated: 11/06/21 2314    Urinalysis, Microscopic Only - Urine, Catheter [[id number]]  (Abnormal) Collected: 11/06/21 1823    Specimen: Urine, Catheter Updated: 11/06/21 1932     RBC, UA Too Numerous to Count /HPF      WBC, UA Too Numerous to Count /HPF      Bacteria, UA 4+ /HPF      Squamous Epithelial Cells, UA 7-12 /HPF      Hyaline Casts, UA None Seen /LPF      Methodology Manual Light Microscopy    Urinalysis With Culture If Indicated - Urine, Catheter [[id number]]  (Abnormal) Collected: 11/06/21 1823    Specimen: Urine, Catheter Updated: 11/06/21 1909     Color, UA Yellow     Appearance, UA Turbid     pH, UA 8.0     Specific Gravity, UA 1.017     Glucose, UA Negative     Ketones, UA Negative     Bilirubin, UA Negative     Blood, UA Large (3+)     Protein, UA >=300 mg/dL (3+)     Leuk Esterase, UA Large (3+)     Nitrite, UA Positive     Urobilinogen, UA 1.0 E.U./dL    Comprehensive Metabolic Panel [[id number]]  (Abnormal) Collected: 11/06/21 1730    Specimen: Blood Updated: 11/06/21 1822     Glucose 154 mg/dL      BUN 15 mg/dL      Creatinine 0.52 mg/dL      Sodium 141 mmol/L      Potassium 4.5  mmol/L      Chloride 104 mmol/L      CO2 26.4 mmol/L      Calcium 9.0 mg/dL      Total Protein 7.2 g/dL      Albumin 3.60 g/dL      ALT (SGPT) 7 U/L      AST (SGOT) 9 U/L      Alkaline Phosphatase 98 U/L      Total Bilirubin 0.5 mg/dL      eGFR Non African Amer >150 mL/min/1.73      Globulin 3.6 gm/dL      A/G Ratio 1.0 g/dL      BUN/Creatinine Ratio 28.8     Anion Gap 10.6 mmol/L     Narrative:      GFR Normal >60  Chronic Kidney Disease <60  Kidney Failure <15      COVID PRE-OP / PRE-PROCEDURE SCREENING ORDER (NO ISOLATION) - Swab, Nasopharynx [[id number]]  (Normal) Collected: 11/06/21 1731    Specimen: Swab from Nasopharynx Updated: 11/06/21 1814    Narrative:      The following orders were created for panel order COVID PRE-OP / PRE-PROCEDURE SCREENING ORDER (NO ISOLATION) - Swab, Nasopharynx.  Procedure                               Abnormality         Status                     ---------                               -----------         ------                     COVID-19,[facility]...[[id number]]  Normal              Final result                 Please view results for these tests on the individual orders.    COVID-19,[facility] CEPHEID/JANINE NP SWAB IN TRANSPORT MEDIA 8-12 HR TAT - Swab, Nasopharynx [[id number]]  (Normal) Collected: 11/06/21 1731    Specimen: Swab from Nasopharynx Updated: 11/06/21 1814     COVID19 Not Detected    Narrative:      Fact sheet for providers: https://www.fda.gov/media/745860/download    Fact sheet for patients: https://www.fda.gov/media/168855/download    Test performed by PCR.    Procalcitonin [[id number]]  (Normal) Collected: 11/06/21 1730    Specimen: Blood Updated: 11/06/21 1811     Procalcitonin 0.13 ng/mL     Narrative:      As a Marker for Sepsis (Non-Neonates):     1. <0.5 ng/mL represents a low risk of severe sepsis and/or septic shock.  2. >2 ng/mL represents a high risk of severe sepsis and/or septic shock.    As a Marker for Lower Respiratory Tract Infections  "that require antibiotic therapy:  PCT on Admission     Antibiotic Therapy             6-12 Hrs later  >0.5                          Strongly Recommended            >0.25 - <0.5             Recommended  0.1 - 0.25                  Discouraged                       Remeasure/reassess PCT  <0.1                         Strongly Discouraged         Remeasure/reassess PCT      As 28 day mortality risk marker: \"Change in Procalcitonin Result\" (>80% or <=80%) if Day 0 (or Day 1) and Day 4 values are available. Refer to http://www.[facility] Turbinepct-calculator.com/    Change in PCT <=80 %   A decrease of PCT levels below or equal to 80% defines a positive change in PCT test result representing a higher risk for 28-day all-cause mortality of patients diagnosed with severe sepsis or septic shock.    Change in PCT >80 %   A decrease of PCT levels of more than 80% defines a negative change in PCT result representing a lower risk for 28-day all-cause mortality of patients diagnosed with severe sepsis or septic shock.                Lactic Acid, Plasma [[id number]]  (Normal) Collected: 11/06/21 1730    Specimen: Blood Updated: 11/06/21 1802     Lactate 1.5 mmol/L     CBC & Differential [[id number]]  (Abnormal) Collected: 11/06/21 1730    Specimen: Blood Updated: 11/06/21 1744    Narrative:      The following orders were created for panel order CBC & Differential.  Procedure                               Abnormality         Status                     ---------                               -----------         ------                     CBC Auto Differential[[id number]]        Abnormal            Final result                 Please view results for these tests on the individual orders.    CBC Auto Differential [[id number]]  (Abnormal) Collected: 11/06/21 1730    Specimen: Blood Updated: 11/06/21 1744     WBC 11.69 10*3/mm3      RBC 4.83 10*6/mm3      Hemoglobin 14.2 g/dL      Hematocrit 41.9 %      MCV 86.7 fL      MCH 29.4 pg      MCHC 33.9 " g/dL      RDW 13.3 %      RDW-SD 42.2 fl      MPV 9.6 fL      Platelets 290 10*3/mm3      Neutrophil % 72.6 %      Lymphocyte % 10.3 %      Monocyte % 9.1 %      Eosinophil % 7.1 %      Basophil % 0.5 %      Immature Grans % 0.4 %      Neutrophils, Absolute 8.49 10*3/mm3      Lymphocytes, Absolute 1.20 10*3/mm3      Monocytes, Absolute 1.06 10*3/mm3      Eosinophils, Absolute 0.83 10*3/mm3      Basophils, Absolute 0.06 10*3/mm3      Immature Grans, Absolute 0.05 10*3/mm3      nRBC 0.0 /100 WBC     Blood Culture - Blood, Arm, Left [[id number]] Collected: 11/06/21 1730    Specimen: Blood from Arm, Left Updated: 11/06/21 1739        Imaging Results (Last 24 Hours)     Procedure Component Value Units Date/Time    CT Abdomen Pelvis With Contrast [[id number]] Collected: 11/06/21 1949     Updated: 11/06/21 2000    Narrative:      CT ABDOMEN PELVIS W CONTRAST-     CLINICAL HISTORY: Abdomen pain     TECHNIQUE: Spiral CT images were acquired through the abdomen and pelvis  with IV contrast only and were reconstructed in 3 mm thick axial slices.     Radiation dose reduction techniques were utilized, including automated  exposure control and exposure modulation based on body size.     COMPARISON: None     FINDINGS: The liver, spleen, pancreas, and adrenal glands appear within  normal limits. There is a 2.3 x 1.3 cm diameter nonobstructing calculus  in the posterior aspect of the upper pole of the right kidney. A few  tiny bilateral renal cysts are also noted. The kidneys are otherwise  unremarkable. There is a tiny hiatal hernia. The stomach and small and  large bowel are otherwise unremarkable. There is no evidence of  obstruction. No abnormal masses or fluid collections are identified  within the abdomen or pelvis. A suprapubic Skaggs catheter is in place.  The tip of the catheter and the inflated balloon of the catheter are  located within the prostatic urethra. There is moderate irregular  bladder wall thickening. 2 small  bladder diverticula are also noted.       Impression:      Large nonobstructing right renal calculus as described. Tiny  bilateral renal cysts. Suprapubic Skaggs catheter in place with the tip  of the catheter and the inflated balloon of the catheter located within  the prostatic urethra. There is bladder wall hypertrophy and there are 2  small bladder diverticuli. Otherwise unremarkable CT scan of the abdomen  and pelvis.     This report was finalized on 11/6/2021 7:57 PM by Dr. Singh Perez M.D.             Current Facility-Administered Medications:   •  budesonide-formoterol (SYMBICORT) 80-4.5 MCG/ACT inhaler 2 puff, 2 puff, Inhalation, BID - RT, Lázaro Hirsch MD  •  buPROPion SR (WELLBUTRIN SR) 12 hr tablet 100 mg, 100 mg, Oral, Once, Lázaro Hirsch MD  •  divalproex (DEPAKOTE) DR tablet 250 mg, 250 mg, Oral, BID, Lázaro Hirsch MD  •  HYDROcodone-acetaminophen (NORCO) 5-325 MG per tablet 1 tablet, 1 tablet, Oral, Q4H PRN, Lázaro Hirsch MD, 1 tablet at 11/07/21 0501  •  ipratropium-albuterol (DUO-NEB) nebulizer solution 3 mL, 3 mL, Nebulization, Q4H - RT, Lázaro Hirsch MD  •  pantoprazole (PROTONIX) EC tablet 20 mg, 20 mg, Oral, Daily, Lázaro Hirsch MD  •  piperacillin-tazobactam (ZOSYN) 3.375 g in iso-osmotic dextrose 50 ml (premix), 3.375 g, Intravenous, Q8H, Lázaro Hirsch MD, 3.375 g at 11/07/21 0830  •  polyethylene glycol (MIRALAX) packet 17 g, 17 g, Oral, Daily, Lázaro Hirsch MD  •  pravastatin (PRAVACHOL) tablet 40 mg, 40 mg, Oral, Nightly, Lázaro Hirsch MD  •  [COMPLETED] Insert peripheral IV, , , Once **AND** sodium chloride 0.9 % flush 10 mL, 10 mL, Intravenous, PRN, Emilio Banegas MD  •  sodium chloride 0.9 % infusion, 75 mL/hr, Intravenous, Continuous, Lázaro Hirsch MD, Last Rate: 75 mL/hr at 11/07/21 0057, 75 mL/hr at 11/07/21 0057  •  tamsulosin (FLOMAX) 24 hr capsule 0.4 mg, 0.4 mg, Oral, Nightly, Lázaro Hirsch MD     ASSESSMENT  Acute UTI  Suprapubic catheter leakage  History of urine  retention  History of prostate cancer  Large nonobstructing right kidney stone  Hypertension  Hyperlipidemia  Depression  Gastroesophageal reflux disease    PLAN  Admit  IVF  IV antibiotics  Urology consult  Continue home medications  Stress ulcer DVT prophylaxis  Supportive care  Patient is full code  Discussed with nursing staff  Follow closely further recommendation current hospital course    MINGO HIRSCH MD                Electronically signed by Mingo Hirsch MD at 11/07/21 1251          Emergency Department Notes      Ernie Martinez, RN at 11/06/21 1659        Pt has supra pubic catheter in place having drainage and pain. Nurse at [facility] also reports scrotal swelling and pain.     Patient masked in first look triage. I was wearing N 95 mask and goggles. `      Electronically signed by Ernie Martinez, RN at 11/06/21 1700     Janet Lozano, RN at 11/06/21 1826        Patient having drainage from subrapubic catheter, it is yellow and has an odor, patient reports they sent him over due to the infection but has been on antibiotics.      Janet Lozano, RN  11/06/21 1827      Electronically signed by Janet Lozano RN at 11/06/21 1827     Anival Garcia RN at 11/06/21 2046        Pt's facility paperwork shows that the patient's suprapubic catheter is 18 Fr with a 10cc balloon       Anival Garcia, TIGIST  11/06/21 2047      Electronically signed by Anival Garcia, RN at 11/06/21 2047       Vital Signs (last 2 days)     Date/Time Temp Temp src Pulse Resp BP Patient Position SpO2    11/08/21 1127 -- -- -- 14 -- -- --    11/08/21 1100 97.4 (36.3) Oral -- 16 159/64 Lying --    11/08/21 0734 -- -- -- 16 -- -- --    11/08/21 0700 97.4 (36.3) Oral -- 20 129/68 Lying --    11/07/21 2300 98.1 (36.7) Oral 88 24 167/74 Lying 96    11/07/21 1953 -- -- 85 20 -- -- 96    11/07/21 1921 98 (36.7) Oral 85 24 122/98 Lying 95    11/07/21 1529 -- -- 86 16 -- -- 94    11/07/21 1500 97.7 (36.5) Oral 88 18 142/65  Lying 96    11/07/21 1100 97.4 (36.3) Oral 87 16 142/56 Lying 95    11/07/21 1031 -- -- 90 16 -- -- 94    11/07/21 0700 98.5 (36.9) Oral 83 20 132/79 Lying 94    11/07/21 0628 -- -- 82 16 -- -- 92    11/07/21 0014 -- -- 93 16 -- -- 96    11/06/21 2348 98 (36.7) Oral 97 16 158/121  Lying 96    11/06/21 2215 97.8 (36.6) Oral 70 16 179/91 -- --    11/06/21 2200 -- Oral -- -- -- -- --    11/06/21 21:35:51 -- -- 82 16 144/79 Lying 98    11/06/21 20:29:14 -- -- 84 18 154/84 -- 96    11/06/21 18:27:22 -- -- 82 18 159/80 -- 95    11/06/21 1700 98.6 (37) -- 57 18 148/64 -- 98    Comments:   BP: nurse notified at 11/06/21 2348       Oxygen Therapy (last 2 days)     Date/Time SpO2 Device (Oxygen Therapy) Flow (L/min) Oxygen Concentration (%) ETCO2 (mmHg)    11/08/21 1127 -- room air -- -- --    11/08/21 1100 -- room air -- -- --    11/08/21 0734 -- room air -- -- --    11/08/21 0700 -- room air -- -- --    11/08/21 0200 -- room air -- -- --    11/07/21 2300 96 room air -- -- --    11/07/21 2000 -- room air -- -- --    11/07/21 1953 96 room air -- -- --    11/07/21 1921 95 room air -- -- --    11/07/21 1529 94 room air -- -- --    11/07/21 1500 96 room air -- -- --    11/07/21 1415 -- room air -- -- --    11/07/21 1100 95 room air -- -- --    11/07/21 1031 94 room air -- -- --    11/07/21 0810 -- room air -- -- --    11/07/21 0700 94 room air -- -- --    11/07/21 0628 92 room air -- -- --    11/07/21 0014 96 room air -- -- --    11/06/21 2348 96 room air -- -- --    11/06/21 2235 -- room air -- -- --    11/06/21 21:35:51 98 room air -- -- --    11/06/21 20:29:14 96 room air -- -- --    11/06/21 18:27:22 95 -- -- -- --    11/06/21 1700 98 room air -- -- --        Intake & Output (last 2 days)       11/06 0701 11/07 0700 11/07 0701 11/08 0700 11/08 0701 11/09 0700    P.O. 120 1170     I.V. (mL/kg)  1290 (15.6)     IV Piggyback 1000 200     Total Intake(mL/kg) 1120 (13.6) 2660 (32.2)     Urine (mL/kg/hr)  1200 (0.6)     Total  Output  1200     Net +1120 +1460            Urine Unmeasured Occurrence  1 x     Stool Unmeasured Occurrence 1 x          Lines, Drains & Airways     Active LDAs     Name Placement date Placement time Site Days    Peripheral IV 11/06/21 1733 Right Antecubital 11/06/21 1733  Antecubital  1    Suprapubic Catheter --  --                     Current Facility-Administered Medications   Medication Dose Route Frequency Provider Last Rate Last Admin   • budesonide-formoterol (SYMBICORT) 80-4.5 MCG/ACT inhaler 2 puff  2 puff Inhalation BID - RT Lázaro Hirsch MD   2 puff at 11/08/21 0734   • divalproex (DEPAKOTE) DR tablet 250 mg  250 mg Oral BID Lázaro Hirsch MD   250 mg at 11/08/21 0851   • HYDROcodone-acetaminophen (NORCO) 5-325 MG per tablet 1 tablet  1 tablet Oral Q4H PRN Lázaro Hirsch MD   1 tablet at 11/08/21 0532   • ipratropium-albuterol (DUO-NEB) nebulizer solution 3 mL  3 mL Nebulization Q4H - RT Lázaro Hirsch MD   3 mL at 11/08/21 1126   • pantoprazole (PROTONIX) EC tablet 40 mg  40 mg Oral Daily Lázaro Hirsch MD   40 mg at 11/08/21 0851   • Pharmacy to dose vancomycin   Does not apply Continuous PRN Lázaro Hirsch MD       • piperacillin-tazobactam (ZOSYN) 3.375 g in iso-osmotic dextrose 50 ml (premix)  3.375 g Intravenous Q8H Lázaro Hirsch MD   3.375 g at 11/08/21 0851   • polyethylene glycol (MIRALAX) packet 17 g  17 g Oral Daily Lázaro Hirsch MD   17 g at 11/08/21 0851   • pravastatin (PRAVACHOL) tablet 40 mg  40 mg Oral Nightly Lázaro Hirsch MD   40 mg at 11/07/21 2045   • sodium chloride 0.9 % flush 10 mL  10 mL Intravenous PRN Emilio Banegas MD       • sodium chloride 0.9 % infusion  75 mL/hr Intravenous Continuous Lázaro Hirsch MD 75 mL/hr at 11/08/21 0851 75 mL/hr at 11/08/21 0851   • tamsulosin (FLOMAX) 24 hr capsule 0.4 mg  0.4 mg Oral Nightly Lázaro Hirsch MD   0.4 mg at 11/07/21 2045   • vancomycin 1250 mg/250 mL 0.9% NS IVPB ([facility])  1,250 mg Intravenous Q12H Lázaro Hirsch MD       • vancomycin 1750  mg/500 mL 0.9% NS IVPB ([facility])  20 mg/kg Intravenous Once Lázaro Hirsch MD   1,750 mg at 11/08/21 1159       Lab Results (last 48 hours)     Procedure Component Value Units Date/Time    Blood Culture ID, PCR - Blood, Arm, Right [[id number]]  (Abnormal) Collected: 11/06/21 1730    Specimen: Blood from Arm, Right Updated: 11/08/21 0954     BCID, PCR Staph spp, not aureus or lugdunesis. Identification by BCID2 PCR.    Wound Culture - Wound, Abdominal Wall [[id number]]  (Abnormal) Collected: 11/06/21 2002    Specimen: Wound from Abdominal Wall Updated: 11/08/21 0940     Wound Culture Scant growth (1+) Mixed Gram Negative Yuli      Light growth (2+) Normal Skin Yuli     Gram Stain Moderate (3+) WBCs seen      Rare (1+) Gram positive cocci      Rare (1+) Gram positive bacilli      Occasional Gram negative bacilli    Blood Culture - Blood, Arm, Right [[id number]]  (Abnormal) Collected: 11/06/21 1730    Specimen: Blood from Arm, Right Updated: 11/08/21 0839     Blood Culture Abnormal Stain     Gram Stain Aerobic Bottle Gram positive cocci in clusters    TSH [[id number]]  (Normal) Collected: 11/08/21 0700    Specimen: Blood Updated: 11/08/21 0801     TSH 4.070 uIU/mL     BNP [[id number]]  (Normal) Collected: 11/08/21 0700    Specimen: Blood Updated: 11/08/21 0800     proBNP 61.7 pg/mL     Narrative:      Among patients with dyspnea, NT-proBNP is highly sensitive for the detection of acute congestive heart failure. In addition NT-proBNP of <300 pg/ml effectively rules out acute congestive heart failure with 99% negative predictive value.    Results may be falsely decreased if patient taking Biotin.      Comprehensive Metabolic Panel [[id number]]  (Abnormal) Collected: 11/08/21 0700    Specimen: Blood Updated: 11/08/21 0756     Glucose 115 mg/dL      BUN 9 mg/dL      Creatinine 0.48 mg/dL      Sodium 141 mmol/L      Potassium 3.5 mmol/L      Chloride 109 mmol/L      CO2 25.7 mmol/L      Calcium 7.9 mg/dL      Total Protein 5.9 g/dL       Albumin 2.90 g/dL      ALT (SGPT) 9 U/L      AST (SGOT) 11 U/L      Alkaline Phosphatase 79 U/L      Total Bilirubin 0.4 mg/dL      eGFR Non African Amer >150 mL/min/1.73      Globulin 3.0 gm/dL      A/G Ratio 1.0 g/dL      BUN/Creatinine Ratio 18.8     Anion Gap 6.3 mmol/L     Narrative:      GFR Normal >60  Chronic Kidney Disease <60  Kidney Failure <15      Lipid Panel [[id number]]  (Abnormal) Collected: 11/08/21 0700    Specimen: Blood Updated: 11/08/21 0756     Total Cholesterol 104 mg/dL      Triglycerides 80 mg/dL      HDL Cholesterol 29 mg/dL      LDL Cholesterol  59 mg/dL      VLDL Cholesterol 16 mg/dL      LDL/HDL Ratio 2.03    Narrative:      Cholesterol Reference Ranges  (U.S. Department of Health and Human Services ATP III Classifications)    Desirable          <200 mg/dL  Borderline High    200-239 mg/dL  High Risk          >240 mg/dL      Triglyceride Reference Ranges  (U.S. Department of Health and Human Services ATP III Classifications)    Normal           <150 mg/dL  Borderline High  150-199 mg/dL  High             200-499 mg/dL  Very High        >500 mg/dL    HDL Reference Ranges  (U.S. Department of Health and Human Services ATP III Classifcations)    Low     <40 mg/dl (major risk factor for CHD)  High    >60 mg/dl ('negative' risk factor for CHD)        LDL Reference Ranges  (U.S. Department of Health and Human Services ATP III Classifcations)    Optimal          <100 mg/dL  Near Optimal     100-129 mg/dL  Borderline High  130-159 mg/dL  High             160-189 mg/dL  Very High        >189 mg/dL    Hemoglobin A1c [[id number]]  (Abnormal) Collected: 11/08/21 0700    Specimen: Blood Updated: 11/08/21 0741     Hemoglobin A1C 6.50 %     Narrative:      Hemoglobin A1C Ranges:    Increased Risk for Diabetes  5.7% to 6.4%  Diabetes                     >= 6.5%  Diabetic Goal                < 7.0%    Blood Culture - Blood, Arm, Left [[id number]]  (Abnormal) Collected: 11/06/21 1730    Specimen: Blood  from Arm, Left Updated: 11/08/21 0737     Blood Culture Growth present, too young to evaluate     Gram Stain Anaerobic Bottle Gram positive cocci in clusters    CBC & Differential [[id number]]  (Abnormal) Collected: 11/08/21 0700    Specimen: Blood Updated: 11/08/21 0732    Narrative:      The following orders were created for panel order CBC & Differential.  Procedure                               Abnormality         Status                     ---------                               -----------         ------                     CBC Auto Differential[[id number]]        Abnormal            Final result                 Please view results for these tests on the individual orders.    CBC Auto Differential [[id number]]  (Abnormal) Collected: 11/08/21 0700    Specimen: Blood Updated: 11/08/21 0732     WBC 9.93 10*3/mm3      RBC 4.22 10*6/mm3      Hemoglobin 11.9 g/dL      Hematocrit 38.4 %      MCV 91.0 fL      MCH 28.2 pg      MCHC 31.0 g/dL      RDW 13.2 %      RDW-SD 44.5 fl      MPV 9.7 fL      Platelets 263 10*3/mm3      Neutrophil % 65.0 %      Lymphocyte % 15.1 %      Monocyte % 10.5 %      Eosinophil % 8.4 %      Basophil % 0.5 %      Immature Grans % 0.5 %      Neutrophils, Absolute 6.46 10*3/mm3      Lymphocytes, Absolute 1.50 10*3/mm3      Monocytes, Absolute 1.04 10*3/mm3      Eosinophils, Absolute 0.83 10*3/mm3      Basophils, Absolute 0.05 10*3/mm3      Immature Grans, Absolute 0.05 10*3/mm3      nRBC 0.0 /100 WBC     Urine Culture - Urine, Urine, Catheter [[id number]] Collected: 11/06/21 1823    Specimen: Urine, Catheter Updated: 11/08/21 0711     Urine Culture >100,000 CFU/mL Mixed Bong Isolated    Narrative:      Specimen contains mixed organisms of questionable pathogenicity which indicates contamination with commensal bong.  Further identification is unlikely to provide clinically useful information.  Suggest recollection.    Blood Culture ID, PCR - Blood, Arm, Left [[id number]]  (Abnormal) Collected:  11/06/21 1730    Specimen: Blood from Arm, Left Updated: 11/08/21 0637     BCID, PCR Staph spp, not aureus or lugdunesis. Identification by BCID2 PCR.     BOTTLE TYPE Anaerobic Bottle    Basic Metabolic Panel [[id number]]  (Abnormal) Collected: 11/07/21 0952    Specimen: Blood Updated: 11/07/21 1047     Glucose 113 mg/dL      BUN 12 mg/dL      Creatinine 0.61 mg/dL      Sodium 140 mmol/L      Potassium 3.7 mmol/L      Chloride 104 mmol/L      CO2 24.3 mmol/L      Calcium 8.6 mg/dL      eGFR Non African Amer 128 mL/min/1.73      BUN/Creatinine Ratio 19.7     Anion Gap 11.7 mmol/L     Narrative:      GFR Normal >60  Chronic Kidney Disease <60  Kidney Failure <15      CBC & Differential [[id number]]  (Abnormal) Collected: 11/07/21 0952    Specimen: Blood Updated: 11/07/21 1006    Narrative:      The following orders were created for panel order CBC & Differential.  Procedure                               Abnormality         Status                     ---------                               -----------         ------                     CBC Auto Differential[[id number]]        Abnormal            Final result                 Please view results for these tests on the individual orders.    CBC Auto Differential [[id number]]  (Abnormal) Collected: 11/07/21 0952    Specimen: Blood Updated: 11/07/21 1006     WBC 11.84 10*3/mm3      RBC 4.71 10*6/mm3      Hemoglobin 13.5 g/dL      Hematocrit 41.5 %      MCV 88.1 fL      MCH 28.7 pg      MCHC 32.5 g/dL      RDW 13.1 %      RDW-SD 42.7 fl      MPV 9.2 fL      Platelets 279 10*3/mm3      Neutrophil % 69.7 %      Lymphocyte % 12.2 %      Monocyte % 10.1 %      Eosinophil % 6.9 %      Basophil % 0.5 %      Immature Grans % 0.6 %      Neutrophils, Absolute 8.26 10*3/mm3      Lymphocytes, Absolute 1.44 10*3/mm3      Monocytes, Absolute 1.19 10*3/mm3      Eosinophils, Absolute 0.82 10*3/mm3      Basophils, Absolute 0.06 10*3/mm3      Immature Grans, Absolute 0.07 10*3/mm3      nRBC 0.0  /100 WBC     Urinalysis, Microscopic Only - Urine, Catheter [[id number]]  (Abnormal) Collected: 11/06/21 1823    Specimen: Urine, Catheter Updated: 11/06/21 1932     RBC, UA Too Numerous to Count /HPF      WBC, UA Too Numerous to Count /HPF      Bacteria, UA 4+ /HPF      Squamous Epithelial Cells, UA 7-12 /HPF      Hyaline Casts, UA None Seen /LPF      Methodology Manual Light Microscopy    Urinalysis With Culture If Indicated - Urine, Catheter [[id number]]  (Abnormal) Collected: 11/06/21 1823    Specimen: Urine, Catheter Updated: 11/06/21 1909     Color, UA Yellow     Appearance, UA Turbid     pH, UA 8.0     Specific Gravity, UA 1.017     Glucose, UA Negative     Ketones, UA Negative     Bilirubin, UA Negative     Blood, UA Large (3+)     Protein, UA >=300 mg/dL (3+)     Leuk Esterase, UA Large (3+)     Nitrite, UA Positive     Urobilinogen, UA 1.0 E.U./dL    Comprehensive Metabolic Panel [[id number]]  (Abnormal) Collected: 11/06/21 1730    Specimen: Blood Updated: 11/06/21 1822     Glucose 154 mg/dL      BUN 15 mg/dL      Creatinine 0.52 mg/dL      Sodium 141 mmol/L      Potassium 4.5 mmol/L      Chloride 104 mmol/L      CO2 26.4 mmol/L      Calcium 9.0 mg/dL      Total Protein 7.2 g/dL      Albumin 3.60 g/dL      ALT (SGPT) 7 U/L      AST (SGOT) 9 U/L      Alkaline Phosphatase 98 U/L      Total Bilirubin 0.5 mg/dL      eGFR Non African Amer >150 mL/min/1.73      Globulin 3.6 gm/dL      A/G Ratio 1.0 g/dL      BUN/Creatinine Ratio 28.8     Anion Gap 10.6 mmol/L     Narrative:      GFR Normal >60  Chronic Kidney Disease <60  Kidney Failure <15      COVID PRE-OP / PRE-PROCEDURE SCREENING ORDER (NO ISOLATION) - Swab, Nasopharynx [[id number]]  (Normal) Collected: 11/06/21 1731    Specimen: Swab from Nasopharynx Updated: 11/06/21 1814    Narrative:      The following orders were created for panel order COVID PRE-OP / PRE-PROCEDURE SCREENING ORDER (NO ISOLATION) - Swab, Nasopharynx.  Procedure                                "Abnormality         Status                     ---------                               -----------         ------                     COVID-19,[facility]...[[id number]]  Normal              Final result                 Please view results for these tests on the individual orders.    COVID-19,[facility] CEPHEID/JANINE NP SWAB IN TRANSPORT MEDIA 8-12 HR TAT - Swab, Nasopharynx [[id number]]  (Normal) Collected: 11/06/21 1731    Specimen: Swab from Nasopharynx Updated: 11/06/21 1814     COVID19 Not Detected    Narrative:      Fact sheet for providers: https://www.fda.gov/media/639438/download    Fact sheet for patients: https://www.fda.gov/media/680572/download    Test performed by PCR.    Procalcitonin [[id number]]  (Normal) Collected: 11/06/21 1730    Specimen: Blood Updated: 11/06/21 1811     Procalcitonin 0.13 ng/mL     Narrative:      As a Marker for Sepsis (Non-Neonates):     1. <0.5 ng/mL represents a low risk of severe sepsis and/or septic shock.  2. >2 ng/mL represents a high risk of severe sepsis and/or septic shock.    As a Marker for Lower Respiratory Tract Infections that require antibiotic therapy:  PCT on Admission     Antibiotic Therapy             6-12 Hrs later  >0.5                          Strongly Recommended            >0.25 - <0.5             Recommended  0.1 - 0.25                  Discouraged                       Remeasure/reassess PCT  <0.1                         Strongly Discouraged         Remeasure/reassess PCT      As 28 day mortality risk marker: \"Change in Procalcitonin Result\" (>80% or <=80%) if Day 0 (or Day 1) and Day 4 values are available. Refer to http://www.my[employer].com-pct-calculator.com/    Change in PCT <=80 %   A decrease of PCT levels below or equal to 80% defines a positive change in PCT test result representing a higher risk for 28-day all-cause mortality of patients diagnosed with severe sepsis or septic shock.    Change in PCT >80 %   A decrease of PCT levels of more than " 80% defines a negative change in PCT result representing a lower risk for 28-day all-cause mortality of patients diagnosed with severe sepsis or septic shock.                Lactic Acid, Plasma [[id number]]  (Normal) Collected: 11/06/21 1730    Specimen: Blood Updated: 11/06/21 1802     Lactate 1.5 mmol/L     CBC & Differential [[id number]]  (Abnormal) Collected: 11/06/21 1730    Specimen: Blood Updated: 11/06/21 1744    Narrative:      The following orders were created for panel order CBC & Differential.  Procedure                               Abnormality         Status                     ---------                               -----------         ------                     CBC Auto Differential[[id number]]        Abnormal            Final result                 Please view results for these tests on the individual orders.    CBC Auto Differential [[id number]]  (Abnormal) Collected: 11/06/21 1730    Specimen: Blood Updated: 11/06/21 1744     WBC 11.69 10*3/mm3      RBC 4.83 10*6/mm3      Hemoglobin 14.2 g/dL      Hematocrit 41.9 %      MCV 86.7 fL      MCH 29.4 pg      MCHC 33.9 g/dL      RDW 13.3 %      RDW-SD 42.2 fl      MPV 9.6 fL      Platelets 290 10*3/mm3      Neutrophil % 72.6 %      Lymphocyte % 10.3 %      Monocyte % 9.1 %      Eosinophil % 7.1 %      Basophil % 0.5 %      Immature Grans % 0.4 %      Neutrophils, Absolute 8.49 10*3/mm3      Lymphocytes, Absolute 1.20 10*3/mm3      Monocytes, Absolute 1.06 10*3/mm3      Eosinophils, Absolute 0.83 10*3/mm3      Basophils, Absolute 0.06 10*3/mm3      Immature Grans, Absolute 0.05 10*3/mm3      nRBC 0.0 /100 WBC           Imaging Results (Last 48 Hours)     Procedure Component Value Units Date/Time    CT Abdomen Pelvis With Contrast [[id number]] Collected: 11/06/21 1949     Updated: 11/06/21 2000    Narrative:      CT ABDOMEN PELVIS W CONTRAST-     CLINICAL HISTORY: Abdomen pain     TECHNIQUE: Spiral CT images were acquired through the abdomen and pelvis  with  IV contrast only and were reconstructed in 3 mm thick axial slices.     Radiation dose reduction techniques were utilized, including automated  exposure control and exposure modulation based on body size.     COMPARISON: None     FINDINGS: The liver, spleen, pancreas, and adrenal glands appear within  normal limits. There is a 2.3 x 1.3 cm diameter nonobstructing calculus  in the posterior aspect of the upper pole of the right kidney. A few  tiny bilateral renal cysts are also noted. The kidneys are otherwise  unremarkable. There is a tiny hiatal hernia. The stomach and small and  large bowel are otherwise unremarkable. There is no evidence of  obstruction. No abnormal masses or fluid collections are identified  within the abdomen or pelvis. A suprapubic Skaggs catheter is in place.  The tip of the catheter and the inflated balloon of the catheter are  located within the prostatic urethra. There is moderate irregular  bladder wall thickening. 2 small bladder diverticula are also noted.       Impression:      Large nonobstructing right renal calculus as described. Tiny  bilateral renal cysts. Suprapubic Skaggs catheter in place with the tip  of the catheter and the inflated balloon of the catheter located within  the prostatic urethra. There is bladder wall hypertrophy and there are 2  small bladder diverticuli. Otherwise unremarkable CT scan of the abdomen  and pelvis.     This report was finalized on 11/6/2021 7:57 PM by Dr. Singh Perez M.D.             Orders (last 48 hrs)      Start     Ordered    11/09/21 1100  Vancomycin, Trough  Timed         11/08/21 1205    11/09/21 0600  Basic Metabolic Panel  Morning Draw         11/08/21 1205    11/08/21 2330  vancomycin 1250 mg/250 mL 0.9% NS IVPB ([facility])  Every 12 Hours         11/08/21 1205    11/08/21 1230  vancomycin 1750 mg/500 mL 0.9% NS IVPB ([facility])  Once         11/08/21 1131    11/08/21 1130  Pharmacy to dose vancomycin  Continuous PRN         11/08/21 1130     11/08/21 0839  Blood Culture ID, PCR - Blood, Arm, Right  Once         11/08/21 0838    11/08/21 0600  CBC & Differential  Morning Draw         11/07/21 1240    11/08/21 0600  Comprehensive Metabolic Panel  Morning Draw         11/07/21 1240    11/08/21 0600  BNP  Morning Draw         11/07/21 1240    11/08/21 0600  TSH  Morning Draw         11/07/21 1240    11/08/21 0600  Lipid Panel  Morning Draw         11/07/21 1240    11/08/21 0600  Hemoglobin A1c  Morning Draw         11/07/21 1240    11/08/21 0600  CBC Auto Differential  PROCEDURE ONCE         11/07/21 2206 11/07/21 2127  Blood Culture ID, PCR - Blood, Arm, Left  Once         11/07/21 2126 11/07/21 2100  tamsulosin (FLOMAX) 24 hr capsule 0.4 mg  Nightly         11/07/21 1239    11/07/21 2100  pravastatin (PRAVACHOL) tablet 40 mg  Nightly         11/07/21 1239    11/07/21 1715  pantoprazole (PROTONIX) EC tablet 40 mg  Daily         11/07/21 1620    11/07/21 1530  ipratropium-albuterol (DUO-NEB) nebulizer solution 3 mL  Every 4 Hours - RT         11/07/21 1240    11/07/21 1400  buPROPion SR (WELLBUTRIN SR) 12 hr tablet 100 mg  Once         11/07/21 1239    11/07/21 1400  divalproex (DEPAKOTE) DR tablet 250 mg  2 Times Daily         11/07/21 1239    11/07/21 1400  budesonide-formoterol (SYMBICORT) 80-4.5 MCG/ACT inhaler 2 puff  2 Times Daily - RT         11/07/21 1239    11/07/21 1400  pantoprazole (PROTONIX) EC tablet 20 mg  Daily,   Status:  Discontinued         11/07/21 1239    11/07/21 1400  polyethylene glycol (MIRALAX) packet 17 g  Daily         11/07/21 1239    11/07/21 1330  piperacillin-tazobactam (ZOSYN) 3.375 g in iso-osmotic dextrose 50 ml (premix)  Once,   Status:  Discontinued         11/07/21 1237    11/07/21 1330  iopamidol (ISOVUE-300) 61 % injection 100 mL  Once in Imaging,   Status:  Discontinued         11/07/21 1237    11/07/21 1330  sodium chloride 0.9 % bolus 1,000 mL  Once,   Status:  Discontinued         11/07/21 1237    11/07/21  1256  PT Consult: Eval & Treat Functional Mobility Below Baseline  Once         11/07/21 1255    11/07/21 1256  OT Consult: Eval & Treat  Once         11/07/21 1255    11/07/21 1241  Place Sequential Compression Device  Once         11/07/21 1240    11/07/21 1241  Maintain Sequential Compression Device  Continuous         11/07/21 1240    11/07/21 1241  Code Status and Medical Interventions:  Continuous         11/07/21 1240    11/07/21 0956  Please order 18 fr charlton and have at bedside in am with charlton catheter kit for possible SPT change on Monday am  Nursing Communication  Once        Comments: Please order 18 fr charlton and have at bedside in am with charlton catheter kit for possible SPT change on Monday am    11/07/21 0955    11/07/21 0600  CBC & Differential  Morning Draw         11/06/21 2259 11/07/21 0600  Basic Metabolic Panel  Morning Draw         11/06/21 2259    11/07/21 0600  CBC Auto Differential  PROCEDURE ONCE         11/06/21 2259    11/07/21 0042  Wound Ostomy Eval & Treat  Once         11/07/21 0042    11/07/21 0000  piperacillin-tazobactam (ZOSYN) 3.375 g in iso-osmotic dextrose 50 ml (premix)  Every 8 Hours         11/06/21 2311    11/06/21 2345  sodium chloride 0.9 % infusion  Continuous         11/06/21 2259 11/06/21 2345  ipratropium-albuterol (DUO-NEB) nebulizer solution 3 mL  4 Times Daily - RT,   Status:  Discontinued         11/06/21 2259 11/06/21 2259  Inpatient Urology Consult  Once        Specialty:  Urology  Provider:  Jackson Garcia Jr., MD    11/06/21 2259 11/06/21 2258  HYDROcodone-acetaminophen (NORCO) 5-325 MG per tablet 1 tablet  Every 4 Hours PRN         11/06/21 2259 11/06/21 2258  Diet Regular  Diet Effective Now         11/06/21 2259 11/06/21 2257  Pharmacy to Dose Zosyn  Continuous PRN,   Status:  Discontinued         11/06/21 2259 11/06/21 1946  Inpatient Admission  Once         11/06/21 1945    11/06/21 1937  iopamidol (ISOVUE-300) 61 % injection 100 mL   "Once in Imaging         11/06/21 1935    11/06/21 1840  Urine Culture - Urine, Urine, Catheter  Once         11/06/21 1839    11/06/21 1840  Urinalysis, Microscopic Only - Urine, Catheter  Once         11/06/21 1839    11/06/21 1809  CT Abdomen Pelvis With Contrast  1 Time Imaging        Comments: IV CONTRAST ONLY      11/06/21 1808    11/06/21 1717  sodium chloride 0.9 % bolus 1,000 mL  Once         11/06/21 1715    11/06/21 1717  piperacillin-tazobactam (ZOSYN) 3.375 g in iso-osmotic dextrose 50 ml (premix)  Once         11/06/21 1715    11/06/21 1716  Wound Culture - Wound, Abdominal Wall  Once         11/06/21 1715    11/06/21 1715  CBC & Differential  Once         11/06/21 1715    11/06/21 1715  Comprehensive Metabolic Panel  Once         11/06/21 1715    11/06/21 1715  Blood Culture - Blood, Arm, Left  Once         11/06/21 1715    11/06/21 1715  Blood Culture - Blood, Arm, Right  Once         11/06/21 1715    11/06/21 1715  Lactic Acid, Plasma  Once         11/06/21 1715    11/06/21 1715  Procalcitonin  Once         11/06/21 1715    11/06/21 1715  Insert peripheral IV  Once        \"And\" Linked Group Details    11/06/21 1715    11/06/21 1715  COVID PRE-OP / PRE-PROCEDURE SCREENING ORDER (NO ISOLATION) - Swab, Nasopharynx  Once         11/06/21 1715    11/06/21 1715  Urinalysis With Culture If Indicated - Urine, Catheter  Once         11/06/21 1715    11/06/21 1715  CBC Auto Differential  PROCEDURE ONCE         11/06/21 1715    11/06/21 1715  COVID-19,[facility] CEPHEID/JANINE NP SWAB IN TRANSPORT MEDIA 8-12 HR TAT - Swab, Nasopharynx  PROCEDURE ONCE         11/06/21 1715    11/06/21 1714  sodium chloride 0.9 % flush 10 mL  As Needed        \"And\" Linked Group Details    11/06/21 1715    --  Fluticasone Furoate-Vilanterol (Breo Ellipta) 100-25 MCG/INH inhaler  Daily - RT         11/06/21 2046    --  POLYETHYLENE GLYCOL 3350 PO  Daily         11/06/21 2046    --  divalproex (DEPAKOTE) 250 MG DR tablet  2 Times " Daily         11/06/21 2046    --  metoprolol tartrate (LOPRESSOR) 25 MG tablet  2 Times Daily,   Status:  Discontinued         11/06/21 2046    --  Calcium Polycarbophil 625 MG chewable tablet         11/06/21 2046    --  Artificial Tear Solution (GenTeal Tears) 0.1-0.2-0.3 % solution  2 Times Daily         11/06/21 2046    --  tuberculin (Tubersol) 5 UNIT/0.1ML injection  Once         11/06/21 2046    --  pantoprazole (PROTONIX) 20 MG EC tablet  Daily         11/06/21 2046    --  simethicone (MYLICON) 80 MG chewable tablet  4 Times Daily Before Meals & Nightly         11/06/21 2046    --  Skin Protectants, Misc. (eucerin) cream  As Needed,   Status:  Discontinued         11/06/21 2046    --  buPROPion SR (WELLBUTRIN SR) 100 MG 12 hr tablet  Once         11/06/21 2046    --  Ipratropium Bromide HFA (ATROVENT HFA IN)  2 Times Daily         11/06/21 2046    --  miconazole (Mae Antifungal) 2 % cream  2 Times Daily         11/06/21 2046    --  mupirocin (BACTROBAN) 2 % cream  2 Times Daily         11/06/21 2046    --  zinc oxide 20 % ointment  2 Times Daily         11/06/21 2046    --  CLOTRIMAZOLE-BETAMETHASONE EX  3 Times Daily         11/06/21 2046    --  mupirocin (BACTROBAN) 2 % ointment  3 Times Daily         11/06/21 2046    --  Fluticasone Furoate-Vilanterol (Breo Ellipta) 100-25 MCG/INH inhaler  Daily - RT,   Status:  Discontinued         11/06/21 2227    --  Skin Protectants, Misc. (eucerin) cream  2 Times Daily         11/06/21 2227    --  ipratropium-albuterol (DUO-NEB) 0.5-2.5 mg/3 ml nebulizer  4 Times Daily         11/06/21 2227    --  metoprolol tartrate (LOPRESSOR) 25 MG tablet  2 Times Daily         11/08/21 1010                   Physician Progress Notes (last 48 hours)      Jackson Zamarripa Jr., MD at 11/08/21 1323        I was called to exchange the patient's SP tube. I discussed the risks and benefits with the patient prior to the procedure.    The balloon of the SP tube was deflated, and the  catheter was removed. The site was then prepped and draped. An 18 Fr coude catheter was placed without difficulty. Yellow fluid drained from the catheter. The balloon was inflated with 10 cc sterile water. The catheter was connected to the drainage bag. The procedure was then terminated.         Electronically signed by Jackson Zamarripa Jr., MD at 21 1325          Consult Notes (last 48 hours)      Nadiya Velásquez APRN at 21 0933      Consult Orders    1. Inpatient Urology Consult [[id number]] ordered by Lázaro Hirsch MD at 21 2259               Urology Consult Note    Patient:Everett Emerson :1940  Room:[facility]/  Admit Date2021  Age:80 y.o.     SEX:male     DOS:2021     MR:[id number]     Visit:[id number]       Attending: Lázaro Hirsch MD  Referring Provider: NATHAN Finnegan  Reason for Consultation: UTI     Patient Care Team:  Jackson Marlow Jr., MD as PCP - General (Family Medicine)  Jackson Marlow Jr., MD as Consulting Physician (Family Medicine)    Chief complaint Lower abdominal pain     Subjective .     History of present illness:  Patient is known to our practice, last seen by Dr. Kemar Giles in the office in 2020.  He has a history of prostate cancer s/p IMRT , chronic urinary retention with suprapubic tube, and hydrocele.  Patient is alert and oriented. SPT is intact, draining prurlent urine. Patient uncertain of last SPT change at NH. Discussed with nursing, 18 fr charlton catheter is currently unavailable. Requested to have at bedside for SPT in am. SPT hand irrigated and placement adjusted and confirmed this morning. Urine draining from SPT. Some bladder spasms around cathter and via phallus.     Review of Systems  Constitutional:  No recent weight gain/loss, fever, chills  Opthalmalogic: No change in vision  Otolaryngeal:  No epistaxis  Cardiovascular: No recent chest pain  Pulmonary:  No cough  Gastrointestinal: Lower abdominal  pain  Genitourinary:  See HPI  Hematologic:  + bruises easily   Musculoskeletal: Denies pain  Neurologic:  No Seizures, new focal deficits      History  Past Medical History:   Diagnosis Date   • Allergic rhinitis    • Anxiety    • Asthma    • BPH without obstruction/lower urinary tract symptoms    • Cancer ([facility])    • Cerebral palsy ([facility])    • Constipation    • COPD (chronic obstructive pulmonary disease) ([facility])    • Depression    • Diabetes mellitus ([facility])     type 2   • GERD (gastroesophageal reflux disease)    • Hyperlipidemia    • Hypertension    • Insomnia    • Mild cognitive impairment    • Mood disorder ([facility])    • Neuromuscular dysfunction of bladder    • Open wound of penis    • Osteoarthritis    • Spasmodic torticollis    • Xerosis cutis      Past Surgical History:   Procedure Laterality Date   • SUPRAPUBIC CATHETER INSERTION       Social History     Socioeconomic History   • Marital status: Single   Tobacco Use   • Smoking status: Never Smoker   • Smokeless tobacco: Never Used   Substance and Sexual Activity   • Alcohol use: No   • Drug use: No     History reviewed. No pertinent family history.  No Known Allergies  Prior to Admission medications    Medication Sig Start Date End Date Taking? Authorizing Provider   alfuzosin (UROXATRAL) 10 MG 24 hr tablet Take 10 mg by mouth Every Night.   Yes Jonatan Jacobson MD   Artificial Tear Solution (GenTeal Tears) 0.1-0.2-0.3 % solution Apply 2 drops to eye(s) as directed by provider 2 (Two) Times a Day.   Yes Jonatan Jacobson MD   baclofen (LIORESAL) 20 MG tablet Take 20 mg by mouth 3 (Three) Times a Day.   Yes Jonatan Jacobson MD   buPROPion SR (WELLBUTRIN SR) 100 MG 12 hr tablet Take 100 mg by mouth 1 (One) Time.   Yes Jonatan Jacobson MD   Calcium Polycarbophil 625 MG chewable tablet Chew. 1 tablet four times a day   Yes Jonatan Jacobson MD   CLOTRIMAZOLE-BETAMETHASONE EX Apply  topically 3 (Three) Times a Day. Apply to buttocks TID   Yes  ProviderJonatan MD   divalproex (DEPAKOTE) 250 MG DR tablet Take 250 mg by mouth 2 (Two) Times a Day.   Yes Jonatan Jacobson MD   Fluticasone Furoate-Vilanterol (Breo Ellipta) 100-25 MCG/INH inhaler Inhale 1 puff Daily.   Yes Jonatan Jacobson MD   Fluticasone Furoate-Vilanterol (Breo Ellipta) 100-25 MCG/INH inhaler Inhale 1 puff Daily.   Yes Jonatan Jacobson MD   Ipratropium Bromide HFA (ATROVENT HFA IN) Inhale 2 puffs.   Yes Jonatan Jacobson MD   ipratropium-albuterol (DUO-NEB) 0.5-2.5 mg/3 ml nebulizer Take 3 mL by nebulization 4 (Four) Times a Day.   Yes Jonatan Jacobson MD   Loratadine 10 MG capsule Take 10 mg by mouth Daily.   Yes Jonatan Jacobson MD   miconazole (Mae Antifungal) 2 % cream Apply 1 application topically to the appropriate area as directed 2 (Two) Times a Day. Apply to buttocks q shift   Yes Jonatan Jacobson MD   mupirocin (BACTROBAN) 2 % cream Apply 1 application topically to the appropriate area as directed 2 (Two) Times a Day.   Yes Jonatan Jacobson MD   mupirocin (BACTROBAN) 2 % ointment Apply 1 application topically to the appropriate area as directed 3 (Three) Times a Day.   Yes Jonatan Jacobson MD   pantoprazole (PROTONIX) 20 MG EC tablet Take 20 mg by mouth Daily.   Yes Jonatan Jacobson MD   POLYETHYLENE GLYCOL 3350 PO Take 1 packet by mouth Daily.   Yes Jonatan Jacobson MD   pravastatin (PRAVACHOL) 20 MG tablet Take 40 mg by mouth Every Night.   Yes Jonatan Jacobson MD   simethicone (MYLICON) 80 MG chewable tablet Chew 80 mg 4 (Four) Times a Day Before Meals & at Bedtime.   Yes Jonatan Jacobson MD   Skin Protectants, Misc. (eucerin) cream Apply 1 application topically to the appropriate area as directed 2 (Two) Times a Day.   Yes Jonatan Jacobson MD   tuberculin (Tubersol) 5 UNIT/0.1ML injection Inject  into the appropriate area of the skin as directed by provider 1 (One) Time.   Yes Jonatan Jacobson MD  "  zinc oxide 20 % ointment Apply 1 application topically to the appropriate area as directed 2 (Two) Times a Day.   Yes Provider, MD Jonatan   buPROPion XL (FORFIVO XL) 450 MG 24 hr tablet Take 1 tablet by mouth Every Morning. 17   Aamir Bess III, MD         Objective     tMax 24 hours:  Temp (24hrs), Av.2 °F (36.8 °C), Min:97.8 °F (36.6 °C), Max:98.6 °F (37 °C)    Vital Sign Ranges:  Temp:  [97.8 °F (36.6 °C)-98.6 °F (37 °C)] 98.5 °F (36.9 °C)  Heart Rate:  [57-97] 83  Resp:  [16-20] 20  BP: (132-179)/() 132/79  Intake and Output Last 3 Shifts:  I/O last 3 completed shifts:  In: 1120 [P.O.:120; IV Piggyback:1000]  Out: -       Physical Exam:     Vital signs: /79 (BP Location: Left arm, Patient Position: Lying)   Pulse 83   Temp 98.5 °F (36.9 °C) (Oral)   Resp 20   Ht 182.9 cm (72\")   Wt 82.6 kg (182 lb 3.2 oz)   SpO2 94%   BMI 24.71 kg/m²   94%     General: Alert and oriented x 3    Appears stated age. No apparent distress.    HEENT: Moist mucous membranes of the oral mucosa & nasal mucosa.    Lips are not cyanotic.    Extraocular movements intact    Neck:  Trachea position is midline.     Respiratory: Respiratory rhythm & depth: Normal.    Respiratory effort:  Normal.          GI:  Nondistended. Nontender.    Skin:  Inspection: No rash.    Palpation:  Warm, dry. No induration.     Extremities: No clubbing & no cyanosis.    No edema    :  SPT intact, hand irrigated and repositioned, draining urine.     Hydrocele    Phallus with split meatus           Results Review:     Lab Results (last 24 hours)     Procedure Component Value Units Date/Time    Urine Culture - Urine, Urine, Catheter [[id number]]  (Normal) Collected: 21    Specimen: Urine, Catheter Updated: 21     Urine Culture Growth present, too young to evaluate    Blood Culture - Blood, Arm, Right [[id number]] Collected: 21 9053    Specimen: Blood from Arm, Right Updated: 21 7316 "    Wound Culture - Wound, Abdominal Wall [[id number]] Collected: 11/06/21 2002    Specimen: Wound from Abdominal Wall Updated: 11/06/21 2123     Gram Stain Moderate (3+) WBCs seen      Rare (1+) Gram positive cocci      Rare (1+) Gram positive bacilli      Occasional Gram negative bacilli    Urinalysis, Microscopic Only - Urine, Catheter [[id number]]  (Abnormal) Collected: 11/06/21 1823    Specimen: Urine, Catheter Updated: 11/06/21 1932     RBC, UA Too Numerous to Count /HPF      WBC, UA Too Numerous to Count /HPF      Bacteria, UA 4+ /HPF      Squamous Epithelial Cells, UA 7-12 /HPF      Hyaline Casts, UA None Seen /LPF      Methodology Manual Light Microscopy    Urinalysis With Culture If Indicated - Urine, Catheter [[id number]]  (Abnormal) Collected: 11/06/21 1823    Specimen: Urine, Catheter Updated: 11/06/21 1909     Color, UA Yellow     Appearance, UA Turbid     pH, UA 8.0     Specific Gravity, UA 1.017     Glucose, UA Negative     Ketones, UA Negative     Bilirubin, UA Negative     Blood, UA Large (3+)     Protein, UA >=300 mg/dL (3+)     Leuk Esterase, UA Large (3+)     Nitrite, UA Positive     Urobilinogen, UA 1.0 E.U./dL    Comprehensive Metabolic Panel [[id number]]  (Abnormal) Collected: 11/06/21 1730    Specimen: Blood Updated: 11/06/21 1822     Glucose 154 mg/dL      BUN 15 mg/dL      Creatinine 0.52 mg/dL      Sodium 141 mmol/L      Potassium 4.5 mmol/L      Chloride 104 mmol/L      CO2 26.4 mmol/L      Calcium 9.0 mg/dL      Total Protein 7.2 g/dL      Albumin 3.60 g/dL      ALT (SGPT) 7 U/L      AST (SGOT) 9 U/L      Alkaline Phosphatase 98 U/L      Total Bilirubin 0.5 mg/dL      eGFR Non African Amer >150 mL/min/1.73      Globulin 3.6 gm/dL      A/G Ratio 1.0 g/dL      BUN/Creatinine Ratio 28.8     Anion Gap 10.6 mmol/L     Narrative:      GFR Normal >60  Chronic Kidney Disease <60  Kidney Failure <15      COVID PRE-OP / PRE-PROCEDURE SCREENING ORDER (NO ISOLATION) - Swab, Nasopharynx [[id number]]   "(Normal) Collected: 11/06/21 1731    Specimen: Swab from Nasopharynx Updated: 11/06/21 1814    Narrative:      The following orders were created for panel order COVID PRE-OP / PRE-PROCEDURE SCREENING ORDER (NO ISOLATION) - Swab, Nasopharynx.  Procedure                               Abnormality         Status                     ---------                               -----------         ------                     COVID-19,[facility]...[[id number]]  Normal              Final result                 Please view results for these tests on the individual orders.    COVID-19,[facility] CEPHEID/JANINE NP SWAB IN TRANSPORT MEDIA 8-12 HR TAT - Swab, Nasopharynx [[id number]]  (Normal) Collected: 11/06/21 1731    Specimen: Swab from Nasopharynx Updated: 11/06/21 1814     COVID19 Not Detected    Narrative:      Fact sheet for providers: https://www.fda.gov/media/676527/download    Fact sheet for patients: https://www.fda.gov/media/209955/download    Test performed by PCR.    Procalcitonin [[id number]]  (Normal) Collected: 11/06/21 1730    Specimen: Blood Updated: 11/06/21 1811     Procalcitonin 0.13 ng/mL     Narrative:      As a Marker for Sepsis (Non-Neonates):     1. <0.5 ng/mL represents a low risk of severe sepsis and/or septic shock.  2. >2 ng/mL represents a high risk of severe sepsis and/or septic shock.    As a Marker for Lower Respiratory Tract Infections that require antibiotic therapy:  PCT on Admission     Antibiotic Therapy             6-12 Hrs later  >0.5                          Strongly Recommended            >0.25 - <0.5             Recommended  0.1 - 0.25                  Discouraged                       Remeasure/reassess PCT  <0.1                         Strongly Discouraged         Remeasure/reassess PCT      As 28 day mortality risk marker: \"Change in Procalcitonin Result\" (>80% or <=80%) if Day 0 (or Day 1) and Day 4 values are available. Refer to http://www.anfixs-pct-calculator.com/    Change in " PCT <=80 %   A decrease of PCT levels below or equal to 80% defines a positive change in PCT test result representing a higher risk for 28-day all-cause mortality of patients diagnosed with severe sepsis or septic shock.    Change in PCT >80 %   A decrease of PCT levels of more than 80% defines a negative change in PCT result representing a lower risk for 28-day all-cause mortality of patients diagnosed with severe sepsis or septic shock.                Lactic Acid, Plasma [[id number]]  (Normal) Collected: 11/06/21 1730    Specimen: Blood Updated: 11/06/21 1802     Lactate 1.5 mmol/L     CBC & Differential [[id number]]  (Abnormal) Collected: 11/06/21 1730    Specimen: Blood Updated: 11/06/21 1744    Narrative:      The following orders were created for panel order CBC & Differential.  Procedure                               Abnormality         Status                     ---------                               -----------         ------                     CBC Auto Differential[[id number]]        Abnormal            Final result                 Please view results for these tests on the individual orders.    CBC Auto Differential [[id number]]  (Abnormal) Collected: 11/06/21 1730    Specimen: Blood Updated: 11/06/21 1744     WBC 11.69 10*3/mm3      RBC 4.83 10*6/mm3      Hemoglobin 14.2 g/dL      Hematocrit 41.9 %      MCV 86.7 fL      MCH 29.4 pg      MCHC 33.9 g/dL      RDW 13.3 %      RDW-SD 42.2 fl      MPV 9.6 fL      Platelets 290 10*3/mm3      Neutrophil % 72.6 %      Lymphocyte % 10.3 %      Monocyte % 9.1 %      Eosinophil % 7.1 %      Basophil % 0.5 %      Immature Grans % 0.4 %      Neutrophils, Absolute 8.49 10*3/mm3      Lymphocytes, Absolute 1.20 10*3/mm3      Monocytes, Absolute 1.06 10*3/mm3      Eosinophils, Absolute 0.83 10*3/mm3      Basophils, Absolute 0.06 10*3/mm3      Immature Grans, Absolute 0.05 10*3/mm3      nRBC 0.0 /100 WBC     Blood Culture - Blood, Arm, Left [[id number]] Collected: 11/06/21  1730    Specimen: Blood from Arm, Left Updated: 11/06/21 1739           Urine Culture   Date Value Ref Range Status   11/06/2021 Growth present, too young to evaluate  Preliminary        Imaging Results (Last 7 Days)     Procedure Component Value Units Date/Time    CT Abdomen Pelvis With Contrast [[id number]] Collected: 11/06/21 1949     Updated: 11/06/21 2000    Narrative:      CT ABDOMEN PELVIS W CONTRAST-     CLINICAL HISTORY: Abdomen pain     TECHNIQUE: Spiral CT images were acquired through the abdomen and pelvis  with IV contrast only and were reconstructed in 3 mm thick axial slices.     Radiation dose reduction techniques were utilized, including automated  exposure control and exposure modulation based on body size.     COMPARISON: None     FINDINGS: The liver, spleen, pancreas, and adrenal glands appear within  normal limits. There is a 2.3 x 1.3 cm diameter nonobstructing calculus  in the posterior aspect of the upper pole of the right kidney. A few  tiny bilateral renal cysts are also noted. The kidneys are otherwise  unremarkable. There is a tiny hiatal hernia. The stomach and small and  large bowel are otherwise unremarkable. There is no evidence of  obstruction. No abnormal masses or fluid collections are identified  within the abdomen or pelvis. A suprapubic Skaggs catheter is in place.  The tip of the catheter and the inflated balloon of the catheter are  located within the prostatic urethra. There is moderate irregular  bladder wall thickening. 2 small bladder diverticula are also noted.       Impression:      Large nonobstructing right renal calculus as described. Tiny  bilateral renal cysts. Suprapubic Skaggs catheter in place with the tip  of the catheter and the inflated balloon of the catheter located within  the prostatic urethra. There is bladder wall hypertrophy and there are 2  small bladder diverticuli. Otherwise unremarkable CT scan of the abdomen  and pelvis.     This report was finalized  on 11/6/2021 7:57 PM by Dr. Singh Perez M.D.                Inpatient Meds:   Current Meds:         Scheduled Meds:ipratropium-albuterol, 3 mL, Nebulization, 4x Daily - RT  piperacillin-tazobactam, 3.375 g, Intravenous, Q8H       Continuous Infusions:Pharmacy to Dose Zosyn,   sodium chloride, 75 mL/hr, Last Rate: 75 mL/hr (11/07/21 0057)       PRN Meds:.•  HYDROcodone-acetaminophen  •  Pharmacy to Dose Zosyn  •  [COMPLETED] Insert peripheral IV **AND** sodium chloride      IMPRESSION       Acute UTI   Urinary Retention with SPT   Hx of Prostate Cancer s/p IMRT 2014     Plan:   Obtain 18 fr charlton catheter to have SPT change on Monday   Follow [facility], continue antibiotics    Will need PSA once UTI results        I discussed the patients findings and my recommendations with patient.    NATHAN Sullivan  11/07/21  09:34 EST      Electronically signed by Nadiya Velásquez APRN at 11/07/21 0996

## 2021-11-08 NOTE — CASE MANAGEMENT/SOCIAL WORK
Discharge Planning Assessment  [facility]     Patient Name: Everett Emerson  MRN: [id number]  Today's Date: 11/8/2021    Admit Date: 11/6/2021     Discharge Needs Assessment     Row Name 11/08/21 1555       Living Environment    Lives With facility resident    Current Living Arrangements extended care facility       Transition Planning    Patient/Family Anticipates Transition to long-term care facility    Patient/Family Anticipated Services at Transition skilled nursing    Transportation Anticipated health plan transportation       Discharge Needs Assessment    Readmission Within the Last 30 Days no previous admission in last 30 days    Current Outpatient/Agency/Support Group skilled nursing facility    Equipment Currently Used at Home --  per facility    Concerns to be Addressed discharge planning    Equipment Needed After Discharge --  per facility    Outpatient/Agency/Support Group Needs skilled nursing facility    Discharge Facility/Level of Care Needs nursing facility, skilled    Provided Post Acute Provider List? N/A    N/A Provider List Comment facility resident    Provided Post Acute Provider Quality & Resource List? N/A    N/A Quality & Resource List Comment facility resident               Discharge Plan     Row Name 11/08/21 1556       Plan    Plan Discharge to [facility]    Plan Comments The patient is a long term care resident at [facility].   I spoke with Nata from [facility] and she advised that the patient had a Medicaid Bed Hold and that he can return upon discharge.  CM will continue to follow.              Continued Care and Services - Admitted Since 11/6/2021     Destination Coordination complete.    Service Provider Request Status Selected Services Address Phone Fax Patient Preferred    [facility] [facility] [place] [address] [phone] --                 Demographic Summary     Row Name 11/08/21 1554        General Information    Admission Type inpatient    Arrived From [facility]    Referral Source admission list    Reason for Consult discharge planning    Preferred Language English     Used During This Interaction no               Functional Status    No documentation.                Psychosocial    No documentation.                Abuse/Neglect    No documentation.                Legal    No documentation.                Substance Abuse    No documentation.                Patient Forms    No documentation.                   Janis Oliveros RN

## 2021-11-08 NOTE — PROGRESS NOTES
I was called to exchange the patient's SP tube. I discussed the risks and benefits with the patient prior to the procedure.    The balloon of the SP tube was deflated, and the catheter was removed. The site was then prepped and draped. An 18 Fr coude catheter was placed without difficulty. Yellow fluid drained from the catheter. The balloon was inflated with 10 cc sterile water. The catheter was connected to the drainage bag. The procedure was then terminated.

## 2021-11-08 NOTE — PLAN OF CARE
Goal Outcome Evaluation:  Plan of Care Reviewed With: patient        Progress: improving  Outcome Summary: VSS, room air, specialty bed, WOCN to see, suprapubic catheter urology to change today, c/o pain prn meds given, IV vanc started for + blood culter

## 2021-11-08 NOTE — SIGNIFICANT NOTE
11/08/21 1452   OTHER   Discipline occupational therapist   Rehab Time/Intention   Session Not Performed other (see comments)  (Per chart review pt from a nursing facility - Hx of CP and limbs contracted. OT to s/o.)

## 2021-11-08 NOTE — PLAN OF CARE
Goal Outcome Evaluation:      VSS on room air, IVF/ABT, PRN norco given, drainage continues from suprapubic cath and penis,

## 2021-11-09 LAB
ANION GAP SERPL CALCULATED.3IONS-SCNC: 10 MMOL/L (ref 5–15)
BACTERIA SPEC AEROBE CULT: ABNORMAL
BACTERIA SPEC AEROBE CULT: ABNORMAL
BASOPHILS # BLD AUTO: 0.04 10*3/MM3 (ref 0–0.2)
BASOPHILS NFR BLD AUTO: 0.5 % (ref 0–1.5)
BUN SERPL-MCNC: 6 MG/DL (ref 8–23)
BUN/CREAT SERPL: 13 (ref 7–25)
CALCIUM SPEC-SCNC: 8.2 MG/DL (ref 8.6–10.5)
CHLORIDE SERPL-SCNC: 105 MMOL/L (ref 98–107)
CO2 SERPL-SCNC: 23 MMOL/L (ref 22–29)
CREAT SERPL-MCNC: 0.46 MG/DL (ref 0.76–1.27)
DEPRECATED RDW RBC AUTO: 42.3 FL (ref 37–54)
EOSINOPHIL # BLD AUTO: 0.85 10*3/MM3 (ref 0–0.4)
EOSINOPHIL NFR BLD AUTO: 9.8 % (ref 0.3–6.2)
ERYTHROCYTE [DISTWIDTH] IN BLOOD BY AUTOMATED COUNT: 13.1 % (ref 12.3–15.4)
GFR SERPL CREATININE-BSD FRML MDRD: >150 ML/MIN/1.73
GLUCOSE SERPL-MCNC: 115 MG/DL (ref 65–99)
GRAM STN SPEC: ABNORMAL
GRAM STN SPEC: ABNORMAL
HCT VFR BLD AUTO: 38.8 % (ref 37.5–51)
HGB BLD-MCNC: 12.6 G/DL (ref 13–17.7)
IMM GRANULOCYTES # BLD AUTO: 0.05 10*3/MM3 (ref 0–0.05)
IMM GRANULOCYTES NFR BLD AUTO: 0.6 % (ref 0–0.5)
ISOLATED FROM: ABNORMAL
ISOLATED FROM: ABNORMAL
LYMPHOCYTES # BLD AUTO: 1.47 10*3/MM3 (ref 0.7–3.1)
LYMPHOCYTES NFR BLD AUTO: 17 % (ref 19.6–45.3)
MCH RBC QN AUTO: 28.5 PG (ref 26.6–33)
MCHC RBC AUTO-ENTMCNC: 32.5 G/DL (ref 31.5–35.7)
MCV RBC AUTO: 87.8 FL (ref 79–97)
MONOCYTES # BLD AUTO: 0.77 10*3/MM3 (ref 0.1–0.9)
MONOCYTES NFR BLD AUTO: 8.9 % (ref 5–12)
NEUTROPHILS NFR BLD AUTO: 5.47 10*3/MM3 (ref 1.7–7)
NEUTROPHILS NFR BLD AUTO: 63.2 % (ref 42.7–76)
NRBC BLD AUTO-RTO: 0 /100 WBC (ref 0–0.2)
PLATELET # BLD AUTO: 300 10*3/MM3 (ref 140–450)
PMV BLD AUTO: 9.5 FL (ref 6–12)
POTASSIUM SERPL-SCNC: 3.1 MMOL/L (ref 3.5–5.2)
RBC # BLD AUTO: 4.42 10*6/MM3 (ref 4.14–5.8)
SODIUM SERPL-SCNC: 138 MMOL/L (ref 136–145)
WBC # BLD AUTO: 8.65 10*3/MM3 (ref 3.4–10.8)

## 2021-11-09 PROCEDURE — 25010000002 PIPERACILLIN SOD-TAZOBACTAM PER 1 G: Performed by: HOSPITALIST

## 2021-11-09 PROCEDURE — 94799 UNLISTED PULMONARY SVC/PX: CPT

## 2021-11-09 PROCEDURE — 85025 COMPLETE CBC W/AUTO DIFF WBC: CPT | Performed by: HOSPITALIST

## 2021-11-09 PROCEDURE — 80048 BASIC METABOLIC PNL TOTAL CA: CPT | Performed by: HOSPITALIST

## 2021-11-09 RX ORDER — NYSTATIN 100000 [USP'U]/G
POWDER TOPICAL EVERY 12 HOURS SCHEDULED
Status: DISCONTINUED | OUTPATIENT
Start: 2021-11-09 | End: 2021-11-09

## 2021-11-09 RX ORDER — POTASSIUM CHLORIDE 750 MG/1
20 TABLET, FILM COATED, EXTENDED RELEASE ORAL 2 TIMES DAILY WITH MEALS
Status: DISCONTINUED | OUTPATIENT
Start: 2021-11-09 | End: 2021-11-10

## 2021-11-09 RX ORDER — POTASSIUM CHLORIDE 750 MG/1
10 TABLET, FILM COATED, EXTENDED RELEASE ORAL DAILY
Status: DISCONTINUED | OUTPATIENT
Start: 2021-11-09 | End: 2021-11-09

## 2021-11-09 RX ADMIN — ZINC OXIDE 1 APPLICATION: 200 OINTMENT TOPICAL at 17:00

## 2021-11-09 RX ADMIN — HYDROCODONE BITARTRATE AND ACETAMINOPHEN 1 TABLET: 5; 325 TABLET ORAL at 01:12

## 2021-11-09 RX ADMIN — BUDESONIDE AND FORMOTEROL FUMARATE DIHYDRATE 2 PUFF: 80; 4.5 AEROSOL RESPIRATORY (INHALATION) at 21:32

## 2021-11-09 RX ADMIN — DIVALPROEX SODIUM 250 MG: 250 TABLET, DELAYED RELEASE ORAL at 08:55

## 2021-11-09 RX ADMIN — PANTOPRAZOLE SODIUM 40 MG: 40 TABLET, DELAYED RELEASE ORAL at 08:55

## 2021-11-09 RX ADMIN — POTASSIUM CHLORIDE 20 MEQ: 750 TABLET, EXTENDED RELEASE ORAL at 17:00

## 2021-11-09 RX ADMIN — POLYETHYLENE GLYCOL 3350 17 G: 17 POWDER, FOR SOLUTION ORAL at 08:55

## 2021-11-09 RX ADMIN — TAZOBACTAM SODIUM AND PIPERACILLIN SODIUM 3.38 G: 375; 3 INJECTION, SOLUTION INTRAVENOUS at 00:48

## 2021-11-09 RX ADMIN — PRAVASTATIN SODIUM 10 MG: 10 TABLET ORAL at 20:58

## 2021-11-09 RX ADMIN — ZINC OXIDE 1 APPLICATION: 200 OINTMENT TOPICAL at 20:59

## 2021-11-09 RX ADMIN — DIVALPROEX SODIUM 250 MG: 250 TABLET, DELAYED RELEASE ORAL at 20:58

## 2021-11-09 RX ADMIN — GLYCERIN, HYPROMELLOSE, POLYETHYLENE GLYCOL 1 DROP: .2; .2; 1 LIQUID OPHTHALMIC at 20:58

## 2021-11-09 RX ADMIN — BUDESONIDE AND FORMOTEROL FUMARATE DIHYDRATE 2 PUFF: 80; 4.5 AEROSOL RESPIRATORY (INHALATION) at 07:37

## 2021-11-09 RX ADMIN — TAMSULOSIN HYDROCHLORIDE 0.4 MG: 0.4 CAPSULE ORAL at 20:58

## 2021-11-09 NOTE — CONSULTS
CONSULT NOTE    Infectious Diseases - Anusha Hernandez MD  [facility]       Patient Identification:  Name: Everett Emerson  Age: 80 y.o.  Sex: male  :  1940  MRN: [id number]             Date of Consultation: 2021      Primary Care Physician: Jackson Marlow Jr., MD                               Requesting Physician: Dr. Hirsch  Reason for Consultation: Positive blood culture    Impression: Patient is a 80-year-old male with complicated past medical history consisting of cerebral palsy, depression, neurogenic bladder with urinary retention for which he is suprapubic catheter in place as well as history of prostate cancer currently a resident of nursing home presented on 2021 from the facility with lower abdominal pain no associated leakage from the suprapubic catheter insertion site.  Work-up revealed abnormal urinalysis consistent with urinary tract infection and normal lactic acid level.  Patient was started on IV Zosyn after urine culture and blood cultures were sent.  Blood cultures drawn at the time of admission presented as positive for gram-positive cocci in clusters presumptively identified as coag negative staph.  Vancomycin was initiated and infectious disease service is consulted.  Patient himself is feeling better.  Patient had low blood pressure wound in the genital and suprapubic area with no obvious evidence of cellulitis.  Patient has been seen by urology service and his suprapubic catheter is exchanged earlier today.  Patient himself is feeling overall better.  1-coag negative staph bacteremia likely contaminant during the process of collection and would not require any specific treatment and further work-up.  2-Chronic suprapubic catheter malfunction associated retention and leakage around the insertion site-status post suprapubic catheter exchange earlier today with improvement in symptoms.  3-possible urinary tract infection so far urine culture have been negative and  patient has normal lactic acid level and procalcitonin level.  4-chronic pressure wound involving the penile and suprapubic area with no obvious evidence of cellulitis with some purulent drainage noted by the wound care nurse Gram stain and cultures are pending  5-history of cerebral palsy with immobility  6-decubitus changes as well as intertriginous fungal infection in the skin folds    Recommendations/Discussions:  At this juncture I would recommend no further work-up for coag negative staph bacteremia and continue local wound care.  His urine culture is negative and patient symptom had an adequate alternate explanation with malfunctioning suprapubic catheter which has been addressed further role of antibiotic therapy is unclear and consider stopping Zosyn.  Continue local care.  Overall concept of care will be discussed with Dr. Venegas.  Thank you Dr. Venegas for letting me be the part of your patient care please see above impression and recommendations      History of Present Illness:   Patient is a 80-year-old male with complicated past medical history consisting of cerebral palsy, depression, neurogenic bladder with urinary retention for which he is suprapubic catheter in place as well as history of prostate cancer currently a resident of nursing home presented on 11/6/2021 from the facility with lower abdominal pain no associated leakage from the suprapubic catheter insertion site.  Work-up revealed abnormal urinalysis consistent with urinary tract infection and normal lactic acid level.  Patient was started on IV Zosyn after urine culture and blood cultures were sent.  Blood cultures drawn at the time of admission presented as positive for gram-positive cocci in clusters presumptively identified as coag negative staph.  Vancomycin was initiated and infectious disease service is consulted.  Patient himself is feeling better.  Patient had low blood pressure wound in the genital and suprapubic area with no  obvious evidence of cellulitis.  Patient has been seen by urology service and his suprapubic catheter is exchanged earlier today.  Patient himself is feeling overall better.      Past Medical History:  Past Medical History:   Diagnosis Date   • Allergic rhinitis    • Anxiety    • Asthma    • BPH without obstruction/lower urinary tract symptoms    • Cancer ([facility])    • Cerebral palsy ([facility])    • Constipation    • COPD (chronic obstructive pulmonary disease) ([facility])    • Depression    • Diabetes mellitus ([facility])     type 2   • GERD (gastroesophageal reflux disease)    • Hyperlipidemia    • Hypertension    • Insomnia    • Mild cognitive impairment    • Mood disorder ([facility])    • Neuromuscular dysfunction of bladder    • Open wound of penis    • Osteoarthritis    • Spasmodic torticollis    • Xerosis cutis      Past Surgical History:  Past Surgical History:   Procedure Laterality Date   • SUPRAPUBIC CATHETER INSERTION        Home Meds:  Medications Prior to Admission   Medication Sig Dispense Refill Last Dose   • alfuzosin (UROXATRAL) 10 MG 24 hr tablet Take 10 mg by mouth Every Night.      • Artificial Tear Solution (GenTeal Tears) 0.1-0.2-0.3 % solution Apply 2 drops to eye(s) as directed by provider 2 (Two) Times a Day.      • baclofen (LIORESAL) 20 MG tablet Take 20 mg by mouth 3 (Three) Times a Day.      • buPROPion SR (WELLBUTRIN SR) 100 MG 12 hr tablet Take 100 mg by mouth 1 (One) Time.      • Calcium Polycarbophil 625 MG chewable tablet Chew. 1 tablet four times a day      • CLOTRIMAZOLE-BETAMETHASONE EX Apply  topically 3 (Three) Times a Day. Apply to buttocks TID      • divalproex (DEPAKOTE) 250 MG DR tablet Take 250 mg by mouth 2 (Two) Times a Day.      • Fluticasone Furoate-Vilanterol (Breo Ellipta) 100-25 MCG/INH inhaler Inhale 1 puff Daily.      • Ipratropium Bromide HFA (ATROVENT HFA IN) Inhale 2 puffs 2 (Two) Times a Day.      • ipratropium-albuterol (DUO-NEB) 0.5-2.5 mg/3 ml nebulizer Take 3 mL by nebulization 4  (Four) Times a Day.      • Loratadine 10 MG capsule Take 10 mg by mouth Daily.      • metoprolol tartrate (LOPRESSOR) 25 MG tablet Take 25 mg by mouth 2 (Two) Times a Day.      • miconazole (Mae Antifungal) 2 % cream Apply 1 application topically to the appropriate area as directed 2 (Two) Times a Day. Apply to buttocks q shift      • mupirocin (BACTROBAN) 2 % cream Apply 1 application topically to the appropriate area as directed 2 (Two) Times a Day.      • mupirocin (BACTROBAN) 2 % ointment Apply 1 application topically to the appropriate area as directed 3 (Three) Times a Day.      • pantoprazole (PROTONIX) 20 MG EC tablet Take 20 mg by mouth Daily.      • POLYETHYLENE GLYCOL 3350 PO Take 1 packet by mouth Daily.      • pravastatin (PRAVACHOL) 20 MG tablet Take 40 mg by mouth Every Night.      • simethicone (MYLICON) 80 MG chewable tablet Chew 80 mg 4 (Four) Times a Day Before Meals & at Bedtime.      • Skin Protectants, Misc. (eucerin) cream Apply 1 application topically to the appropriate area as directed 2 (Two) Times a Day.      • tuberculin (Tubersol) 5 UNIT/0.1ML injection Inject  into the appropriate area of the skin as directed by provider 1 (One) Time.      • zinc oxide 20 % ointment Apply 1 application topically to the appropriate area as directed 2 (Two) Times a Day.        Current Meds:     Current Facility-Administered Medications:   •  budesonide-formoterol (SYMBICORT) 80-4.5 MCG/ACT inhaler 2 puff, 2 puff, Inhalation, BID - RT, Lázaro Hirsch MD, 2 puff at 11/08/21 2212  •  divalproex (DEPAKOTE) DR tablet 250 mg, 250 mg, Oral, BID, Lázaro Hirsch MD, 250 mg at 11/08/21 2008  •  HYDROcodone-acetaminophen (NORCO) 5-325 MG per tablet 1 tablet, 1 tablet, Oral, Q4H PRN, Lázaro Hirsch MD, 1 tablet at 11/08/21 0532  •  hydrocortisone-bacitracin-zinc oxide-nystatin (MAGIC BARRIER) ointment 1 application, 1 application, Topical, TID, Lázaro Hirsch MD  •  ipratropium-albuterol (DUO-NEB) nebulizer solution 3  mL, 3 mL, Nebulization, Q4H PRN, Lázaro Hirsch MD  •  pantoprazole (PROTONIX) EC tablet 40 mg, 40 mg, Oral, Daily, Lázaro Hirsch MD, 40 mg at 11/08/21 0851  •  Pharmacy to dose vancomycin, , Does not apply, Continuous PRN, Lázaro Hirsch MD  •  piperacillin-tazobactam (ZOSYN) 3.375 g in iso-osmotic dextrose 50 ml (premix), 3.375 g, Intravenous, Q8H, Lázaro Hirsch MD, 3.375 g at 11/08/21 1626  •  polyethylene glycol (MIRALAX) packet 17 g, 17 g, Oral, Daily, Lázaro Hirsch MD, 17 g at 11/08/21 0851  •  pravastatin (PRAVACHOL) tablet 10 mg, 10 mg, Oral, Nightly, Lázaro Hirsch MD, 10 mg at 11/08/21 2008  •  [COMPLETED] Insert peripheral IV, , , Once **AND** sodium chloride 0.9 % flush 10 mL, 10 mL, Intravenous, PRN, Emilio Banegas MD  •  sodium chloride 0.9 % with KCl 20 mEq/L infusion, 50 mL/hr, Intravenous, Continuous, Lázaro Hirsch MD, Last Rate: 50 mL/hr at 11/08/21 1809, 50 mL/hr at 11/08/21 1809  •  tamsulosin (FLOMAX) 24 hr capsule 0.4 mg, 0.4 mg, Oral, Nightly, Lázaro Hirsch MD, 0.4 mg at 11/08/21 2008  •  vancomycin 1250 mg/250 mL 0.9% NS IVPB ([facility]), 1,250 mg, Intravenous, Q12H, Lázaro Hirsch MD  Allergies:  No Known Allergies  Social History:   Social History     Tobacco Use   • Smoking status: Never Smoker   • Smokeless tobacco: Never Used   Substance Use Topics   • Alcohol use: No      Family History:  History reviewed. No pertinent family history.       Review of Systems  See history of present illness and past medical history.   Constitutional: Remarkable for no fever or chills  Cardiovascular: Without chest pain or shortness of breath  GI: Remarkable for diffuse abdominal discomfort no nausea vomiting or diarrhea  : Remarkable for suprapubic catheter abdominal discomfort upon admission which is now improved.  Musculoskeletal: Remarkable for contractures and no new joint aches and pain  Neurological: Remarkable for cerebral palsy and associated immobility.  Remainder of ROS is negative.      Vitals:   BP  "127/73 (BP Location: Left arm, Patient Position: Lying)   Pulse 88   Temp 98.4 °F (36.9 °C) (Oral)   Resp 18   Ht 182.9 cm (72\")   Wt 82.6 kg (182 lb 3.2 oz)   SpO2 95%   BMI 24.71 kg/m²   I/O:     Intake/Output Summary (Last 24 hours) at 11/8/2021 2217  Last data filed at 11/8/2021 1700  Gross per 24 hour   Intake 1340 ml   Output 1150 ml   Net 190 ml     Exam:  Patient is examined using the personal protective equipment as per guidelines from infection control for this particular patient as enacted.  Hand washing was performed before and after patient interaction.  General Appearance:    Alert, cooperative, no distress, appears stated age   Head:    Normocephalic, without obvious abnormality, atraumatic   Eyes:    PERRL, conjunctivae/corneas clear, EOM's intact, both eyes   Ears:    Normal external ear canals, both ears   Nose:   Nares normal, septum midline, mucosa normal, no drainage    or sinus tenderness   Throat:   Lips, tongue, gums normal; oral mucosa pink and moist   Neck:   Supple, symmetrical, trachea midline, no adenopathy;     thyroid:  no enlargement/tenderness/nodules; no carotid    bruit or JVD   Back:     Symmetric, no curvature, ROM normal, no CVA tenderness   Lungs:     Clear to auscultation bilaterally, respirations unlabored   Chest Wall:    No tenderness or deformity    Heart:    Regular rate and rhythm, S1 and S2 normal, no murmur, rub   or gallop   Abdomen:            Extremities:  Chronic contractures   Pulses:   Pulses palpable in all extremities; symmetric all extremities   Skin:                      Neurologic:  Residual deficits from cerebral palsy but awake interactive and pleasant       Data Review:    I reviewed the patient's new clinical results.  Results from last 7 days   Lab Units 11/08/21  0700 11/07/21  0952 11/06/21  1730   WBC 10*3/mm3 9.93 11.84* 11.69*   HEMOGLOBIN g/dL 11.9* 13.5 14.2   PLATELETS 10*3/mm3 263 279 290     Results from last 7 days   Lab Units " 11/08/21  0700 11/07/21  0952 11/06/21  1730   SODIUM mmol/L 141 140 141   POTASSIUM mmol/L 3.5 3.7 4.5   CHLORIDE mmol/L 109* 104 104   CO2 mmol/L 25.7 24.3 26.4   BUN mg/dL 9 12 15   CREATININE mg/dL 0.48* 0.61* 0.52*   CALCIUM mg/dL 7.9* 8.6 9.0   GLUCOSE mg/dL 115* 113* 154*     Microbiology Results (last 10 days)     Procedure Component Value - Date/Time    Wound Culture - Wound, Abdominal Wall [[id number]]  (Abnormal) Collected: 11/06/21 2002    Lab Status: Final result Specimen: Wound from Abdominal Wall Updated: 11/08/21 0940     Wound Culture Scant growth (1+) Mixed Gram Negative Bong      Light growth (2+) Normal Skin Bong     Gram Stain Moderate (3+) WBCs seen      Rare (1+) Gram positive cocci      Rare (1+) Gram positive bacilli      Occasional Gram negative bacilli    Urine Culture - Urine, Urine, Catheter [[id number]] Collected: 11/06/21 1823    Lab Status: Final result Specimen: Urine, Catheter Updated: 11/08/21 0711     Urine Culture >100,000 CFU/mL Mixed Bnog Isolated    Narrative:      Specimen contains mixed organisms of questionable pathogenicity which indicates contamination with commensal bong.  Further identification is unlikely to provide clinically useful information.  Suggest recollection.    COVID PRE-OP / PRE-PROCEDURE SCREENING ORDER (NO ISOLATION) - Swab, Nasopharynx [[id number]]  (Normal) Collected: 11/06/21 1731    Lab Status: Final result Specimen: Swab from Nasopharynx Updated: 11/06/21 1814    Narrative:      The following orders were created for panel order COVID PRE-OP / PRE-PROCEDURE SCREENING ORDER (NO ISOLATION) - Swab, Nasopharynx.  Procedure                               Abnormality         Status                     ---------                               -----------         ------                     COVID-[facility] IN-HOUSE...[[id number]]  Normal              Final result                 Please view results for these tests on the individual orders.    COVID-19,[facility]  IN-HOUSE CEPHEID/JANINE NP SWAB IN TRANSPORT MEDIA 8-12 HR TAT - Swab, Nasopharynx [[id number]]  (Normal) Collected: 11/06/21 1731    Lab Status: Final result Specimen: Swab from Nasopharynx Updated: 11/06/21 1814     COVID19 Not Detected    Narrative:      Fact sheet for providers: https://www.fda.gov/media/000662/download    Fact sheet for patients: https://www.fda.gov/media/019231/download    Test performed by PCR.    Blood Culture - Blood, Arm, Left [[id number]]  (Abnormal) Collected: 11/06/21 1730    Lab Status: Preliminary result Specimen: Blood from Arm, Left Updated: 11/08/21 0737     Blood Culture Growth present, too young to evaluate     Gram Stain Anaerobic Bottle Gram positive cocci in clusters    Blood Culture - Blood, Arm, Right [[id number]]  (Abnormal) Collected: 11/06/21 1730    Lab Status: Preliminary result Specimen: Blood from Arm, Right Updated: 11/08/21 0839     Blood Culture Abnormal Stain     Gram Stain Aerobic Bottle Gram positive cocci in clusters    Blood Culture ID, PCR - Blood, Arm, Left [[id number]]  (Abnormal) Collected: 11/06/21 1730    Lab Status: Final result Specimen: Blood from Arm, Left Updated: 11/08/21 0637     BCID, PCR Staph spp, not aureus or lugdunesis. Identification by BCID2 PCR.     BOTTLE TYPE Anaerobic Bottle    Blood Culture ID, PCR - Blood, Arm, Right [[id number]]  (Abnormal) Collected: 11/06/21 1730    Lab Status: Final result Specimen: Blood from Arm, Right Updated: 11/08/21 0954     BCID, PCR Staph spp, not aureus or lugdunesis. Identification by BCID2 PCR.            Assessment:  Active Hospital Problems    Diagnosis  POA   • Acute UTI [N39.0]  Yes      Resolved Hospital Problems   No resolved problems to display.         Plan:  See above  Anusha Murphy MD   11/8/2021  22:17 EST    Much of this encounter note is an electronic transcription/translation of spoken language to printed text. The electronic translation of spoken language may permit erroneous, or at times,  nonsensical words or phrases to be inadvertently transcribed; Although I have reviewed the note for such errors, some may still exist

## 2021-11-09 NOTE — PROGRESS NOTES
"  Infectious Diseases Progress Note    Anusha Murphy MD     [facility]  [place]: 3 days  Patient Identification:  Name: Everett Emerson  Age: 80 y.o.  Sex: male  :  1940  MRN: [id number]         Primary Care Physician: Jackson Marlow Jr., MD            Subjective: Feeling better denies any complaints.  Interval History: See consultation note.    Objective:    Scheduled Meds:budesonide-formoterol, 2 puff, Inhalation, BID - RT  divalproex, 250 mg, Oral, BID  hydrocortisone-bacitracin-zinc oxide-nystatin, 1 application, Topical, TID  pantoprazole, 40 mg, Oral, Daily  polyethylene glycol, 17 g, Oral, Daily  pravastatin, 10 mg, Oral, Nightly  tamsulosin, 0.4 mg, Oral, Nightly      Continuous Infusions:sodium chloride 0.9 % with KCl 20 mEq, 50 mL/hr, Last Rate: 50 mL/hr (21 1809)        Vital signs in last 24 hours:  Temp:  [97.4 °F (36.3 °C)-98.6 °F (37 °C)] 98 °F (36.7 °C)  Heart Rate:  [86-92] 89  Resp:  [14-20] 18  BP: (127-170)/() 153/80    Intake/Output:    Intake/Output Summary (Last 24 hours) at 2021 0908  Last data filed at 2021 0836  Gross per 24 hour   Intake 1280 ml   Output 975 ml   Net 305 ml       Exam:  /80 (BP Location: Left arm, Patient Position: Lying)   Pulse 89   Temp 98 °F (36.7 °C) (Oral)   Resp 18   Ht 182.9 cm (72\")   Wt 82.6 kg (182 lb 3.2 oz)   SpO2 96%   BMI 24.71 kg/m²   Patient is examined using the personal protective equipment as per guidelines from infection control for this particular patient as enacted.  Hand washing was performed before and after patient interaction.  General Appearance:    Alert, cooperative, no distress, AAOx3  Head:    Normocephalic, without obvious abnormality, atraumatic   Eyes:    PERRL, conjunctivae/corneas clear, EOM's intact, both eyes   Ears:    Normal external ear canals, both ears   Nose:   Nares normal, septum midline, mucosa normal, no drainage    or sinus tenderness   Throat:   Lips, tongue, gums normal; " oral mucosa pink and moist   Neck:   Supple, symmetrical, trachea midline, no adenopathy;     thyroid:  no enlargement/tenderness/nodules; no carotid    bruit or JVD   Back:     Symmetric, no curvature, ROM normal, no CVA tenderness   Lungs:     Clear to auscultation bilaterally, respirations unlabored   Chest Wall:    No tenderness or deformity    Heart:    Regular rate and rhythm, S1 and S2 normal, no murmur, rub   or gallop   Abdomen:              Extremities:  Chronic contractures   Pulses:   Pulses palpable in all extremities; symmetric all extremities   Skin:                           Neurologic:  Residual deficits from cerebral palsy but awake interactive and pleasant            Data Review:    I reviewed the patient's new clinical results.  Results from last 7 days   Lab Units 11/09/21  0542 11/08/21  0700 11/07/21  0952 11/06/21  1730   WBC 10*3/mm3 8.65 9.93 11.84* 11.69*   HEMOGLOBIN g/dL 12.6* 11.9* 13.5 14.2   PLATELETS 10*3/mm3 300 263 279 290     Results from last 7 days   Lab Units 11/09/21  0541 11/08/21  0700 11/07/21  0952 11/06/21  1730   SODIUM mmol/L 138 141 140 141   POTASSIUM mmol/L 3.1* 3.5 3.7 4.5   CHLORIDE mmol/L 105 109* 104 104   CO2 mmol/L 23.0 25.7 24.3 26.4   BUN mg/dL 6* 9 12 15   CREATININE mg/dL 0.46* 0.48* 0.61* 0.52*   CALCIUM mg/dL 8.2* 7.9* 8.6 9.0   GLUCOSE mg/dL 115* 115* 113* 154*     Microbiology Results (last 10 days)     Procedure Component Value - Date/Time    Wound Culture - Wound, Abdominal Wall [[id number]]  (Abnormal) Collected: 11/06/21 2002    Lab Status: Final result Specimen: Wound from Abdominal Wall Updated: 11/08/21 0940     Wound Culture Scant growth (1+) Mixed Gram Negative Yuli      Light growth (2+) Normal Skin Yuli     Gram Stain Moderate (3+) WBCs seen      Rare (1+) Gram positive cocci      Rare (1+) Gram positive bacilli      Occasional Gram negative bacilli    Urine Culture - Urine, Urine, Catheter [[id number]] Collected: 11/06/21 1823    Lab Status:  Final result Specimen: Urine, Catheter Updated: 11/08/21 0711     Urine Culture >100,000 CFU/mL Mixed Bong Isolated    Narrative:      Specimen contains mixed organisms of questionable pathogenicity which indicates contamination with commensal bong.  Further identification is unlikely to provide clinically useful information.  Suggest recollection.    COVID PRE-OP / PRE-PROCEDURE SCREENING ORDER (NO ISOLATION) - Swab, Nasopharynx [[id number]]  (Normal) Collected: 11/06/21 1731    Lab Status: Final result Specimen: Swab from Nasopharynx Updated: 11/06/21 1814    Narrative:      The following orders were created for panel order COVID PRE-OP / PRE-PROCEDURE SCREENING ORDER (NO ISOLATION) - Swab, Nasopharynx.  Procedure                               Abnormality         Status                     ---------                               -----------         ------                     COVID-19,[facility]...[[id number]]  Normal              Final result                 Please view results for these tests on the individual orders.    COVID-19,[facility] CEPHEID/JANINE NP SWAB IN TRANSPORT MEDIA 8-12 HR TAT - Swab, Nasopharynx [[id number]]  (Normal) Collected: 11/06/21 1731    Lab Status: Final result Specimen: Swab from Nasopharynx Updated: 11/06/21 1814     COVID19 Not Detected    Narrative:      Fact sheet for providers: https://www.fda.gov/media/404152/download    Fact sheet for patients: https://www.fda.gov/media/864278/download    Test performed by PCR.    Blood Culture - Blood, Arm, Left [[id number]]  (Abnormal) Collected: 11/06/21 1730    Lab Status: Final result Specimen: Blood from Arm, Left Updated: 11/09/21 0617     Blood Culture Staphylococcus, coagulase negative     Comment: Probable contaminant requires clinical correlation, susceptibility not performed unless requested by physician.          Isolated from Anaerobic Bottle     Gram Stain Anaerobic Bottle Gram positive cocci in clusters    Blood Culture -  Blood, Arm, Right [[id number]]  (Abnormal) Collected: 11/06/21 1730    Lab Status: Final result Specimen: Blood from Arm, Right Updated: 11/09/21 0628     Blood Culture Staphylococcus, coagulase negative     Comment: Probable contaminant requires clinical correlation, susceptibility not performed unless requested by physician.          Isolated from Aerobic Bottle     Gram Stain Aerobic Bottle Gram positive cocci in clusters    Blood Culture ID, PCR - Blood, Arm, Left [[id number]]  (Abnormal) Collected: 11/06/21 1730    Lab Status: Final result Specimen: Blood from Arm, Left Updated: 11/08/21 0637     BCID, PCR Staph spp, not aureus or lugdunesis. Identification by BCID2 PCR.     BOTTLE TYPE Anaerobic Bottle    Blood Culture ID, PCR - Blood, Arm, Right [[id number]]  (Abnormal) Collected: 11/06/21 1730    Lab Status: Final result Specimen: Blood from Arm, Right Updated: 11/08/21 0954     BCID, PCR Staph spp, not aureus or lugdunesis. Identification by BCID2 PCR.            Assessment:  1-coag negative staph bacteremia likely contaminant during the process of collection and would not require any specific treatment and further work-up.  2-Chronic suprapubic catheter malfunction associated retention and leakage around the insertion site-status post suprapubic catheter exchange earlier today with improvement in symptoms.  3-possible urinary tract infection so far urine culture have been negative and patient has normal lactic acid level and procalcitonin level.  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  His urine culture is  negative and patient symptom had an adequate alternate explanation with malfunctioning suprapubic catheter which has been addressed further role of antibiotic therapy is unclear .  · DC Zosyn  · Observe off of antibiotic therapy.  · Continue local care.    Anusha Murphy MD  11/9/2021  09:08 EST    Much of this encounter note is an electronic transcription/translation of spoken language to printed text. The electronic translation of spoken language may permit erroneous, or at times, nonsensical words or phrases to be inadvertently transcribed; Although I have reviewed the note for such errors, some may still exist

## 2021-11-09 NOTE — PLAN OF CARE
Goal Outcome Evaluation:  Plan of Care Reviewed With: patient   Progress: no change  Outcome Summary: VSS. On room air. Pain controlled with prn norco. Suprapubic catheter with drainage around site, split gauze dressing changed prn during shift. Dressing changed to penile wound per order. Pt turned q2hr. Heel boots in place. Anifungal cream applied to buttock and mana area. IV fluids and abx given as ordered.

## 2021-11-09 NOTE — PLAN OF CARE
Goal Outcome Evaluation:  Plan of Care Reviewed With: patient        Progress: improving  Outcome Summary: Patient alert, oriented x 4, pleasant; VSS. Patient hands are contracted, BUE are weak, BLE are nearly flaccid, but he does attempt to move his extremities upon request. Patient on specialty bed; turned q2h. Suprapubic catheter has leaked only a small amount this shift; wrapped in male incontinence wraps to keep patient dry. MASD mana area and bottom; magic cream applied per orders and incontinence pads used to promote dry area. Plan for patient to go back to facility tomorrow. Will continue supportive care.

## 2021-11-09 NOTE — PROGRESS NOTES
"Daily progress note    Chief complaint  Doing better  No new complaints  Denies any abdominal pain nausea vomiting    History of present illness  80-year-old male with history of asthma hypertension hyperlipidemia cerebral palsy depression who also have a chronic suprapubic catheter in place with history of prostate cancer presented to [facility] emergency room with abdominal pain.  Patient awake and alert he stated that his suprapubic catheter is has been leaking.  Patient denies any fever cough chest pain shortness of breath abdominal pain nausea vomiting diarrhea.  Patient evaluated in ER found to have UTI with suprapubic catheter leakage admit for management.  Patient is a poor historian unable to give detail history most of the history obtained from the chart nursing staff old record.     REVIEW OF SYSTEMS  Unremarkable except generalized weakness     PHYSICAL EXAM  Blood pressure 153/71, pulse 89, temperature 97.8 °F (36.6 °C), temperature source Oral, resp. rate 18, height 182.9 cm (72\"), weight 82.6 kg (182 lb 3.2 oz), SpO2 96 %.    GENERAL: Awake and alert, nontoxic-appearing  HENT: nares patent  EYES: no scleral icterus  CV: regular rhythm, regular rate  RESPIRATORY: normal effort moving air bilaterally  ABDOMEN: soft, mild suprapubic tenderness to palpation.  There is a mature-looking suprapubic stoma with catheter in place.  There is no erythema, but it is tender to palpation around it with foul-smelling purulent discharge.  Bowel sounds positive  MUSCULOSKELETAL:  Chronic contractures  NEURO: alert, appears to be at his neurologic baseline  SKIN: warm, dry     LAB RESULTS  Lab Results (last 24 hours)     Procedure Component Value Units Date/Time    Basic Metabolic Panel [[id number]]  (Abnormal) Collected: 11/09/21 0541    Specimen: Blood Updated: 11/09/21 0726     Glucose 115 mg/dL      BUN 6 mg/dL      Creatinine 0.46 mg/dL      Sodium 138 mmol/L      Potassium 3.1 mmol/L      Chloride 105 " mmol/L      CO2 23.0 mmol/L      Calcium 8.2 mg/dL      eGFR Non African Amer >150 mL/min/1.73      BUN/Creatinine Ratio 13.0     Anion Gap 10.0 mmol/L     Narrative:      GFR Normal >60  Chronic Kidney Disease <60  Kidney Failure <15      Blood Culture - Blood, Arm, Right [[id number]]  (Abnormal) Collected: 11/06/21 1730    Specimen: Blood from Arm, Right Updated: 11/09/21 0628     Blood Culture Staphylococcus, coagulase negative     Comment: Probable contaminant requires clinical correlation, susceptibility not performed unless requested by physician.          Isolated from Aerobic Bottle     Gram Stain Aerobic Bottle Gram positive cocci in clusters    CBC & Differential [[id number]]  (Abnormal) Collected: 11/09/21 0542    Specimen: Blood Updated: 11/09/21 0627    Narrative:      The following orders were created for panel order CBC & Differential.  Procedure                               Abnormality         Status                     ---------                               -----------         ------                     CBC Auto Differential[[id number]]        Abnormal            Final result                 Please view results for these tests on the individual orders.    CBC Auto Differential [[id number]]  (Abnormal) Collected: 11/09/21 0542    Specimen: Blood Updated: 11/09/21 0627     WBC 8.65 10*3/mm3      RBC 4.42 10*6/mm3      Hemoglobin 12.6 g/dL      Hematocrit 38.8 %      MCV 87.8 fL      MCH 28.5 pg      MCHC 32.5 g/dL      RDW 13.1 %      RDW-SD 42.3 fl      MPV 9.5 fL      Platelets 300 10*3/mm3      Neutrophil % 63.2 %      Lymphocyte % 17.0 %      Monocyte % 8.9 %      Eosinophil % 9.8 %      Basophil % 0.5 %      Immature Grans % 0.6 %      Neutrophils, Absolute 5.47 10*3/mm3      Lymphocytes, Absolute 1.47 10*3/mm3      Monocytes, Absolute 0.77 10*3/mm3      Eosinophils, Absolute 0.85 10*3/mm3      Basophils, Absolute 0.04 10*3/mm3      Immature Grans, Absolute 0.05 10*3/mm3      nRBC 0.0 /100 WBC      Blood Culture - Blood, Arm, Left [[id number]]  (Abnormal) Collected: 11/06/21 1730    Specimen: Blood from Arm, Left Updated: 11/09/21 0617     Blood Culture Staphylococcus, coagulase negative     Comment: Probable contaminant requires clinical correlation, susceptibility not performed unless requested by physician.          Isolated from Anaerobic Bottle     Gram Stain Anaerobic Bottle Gram positive cocci in clusters        Imaging Results (Last 24 Hours)     ** No results found for the last 24 hours. **          Current Facility-Administered Medications:   •  budesonide-formoterol (SYMBICORT) 80-4.5 MCG/ACT inhaler 2 puff, 2 puff, Inhalation, BID - RT, Lázaro Hirsch MD, 2 puff at 11/09/21 0737  •  divalproex (DEPAKOTE) DR tablet 250 mg, 250 mg, Oral, BID, Lázaro Hirsch MD, 250 mg at 11/09/21 0855  •  Glycerin-Hypromellose- (ARTIFICIAL TEARS) 0.2-0.2-1 % ophthalmic solution solution 1 drop, 1 drop, Both Eyes, Q3H PRN, Lázaro Hirsch MD  •  HYDROcodone-acetaminophen (NORCO) 5-325 MG per tablet 1 tablet, 1 tablet, Oral, Q4H PRN, Lázaro Hirsch MD, 1 tablet at 11/09/21 0112  •  hydrocortisone-bacitracin-zinc oxide-nystatin (MAGIC BARRIER) ointment 1 application, 1 application, Topical, TID, Lázaro Hirsch MD  •  ipratropium-albuterol (DUO-NEB) nebulizer solution 3 mL, 3 mL, Nebulization, Q4H PRN, Lázaro Hirsch MD  •  pantoprazole (PROTONIX) EC tablet 40 mg, 40 mg, Oral, Daily, Lázaro Hirsch MD, 40 mg at 11/09/21 0855  •  polyethylene glycol (MIRALAX) packet 17 g, 17 g, Oral, Daily, Lázaro Hirsch MD, 17 g at 11/09/21 0855  •  pravastatin (PRAVACHOL) tablet 10 mg, 10 mg, Oral, Nightly, Lázaro Hirsch MD, 10 mg at 11/08/21 2008  •  [COMPLETED] Insert peripheral IV, , , Once **AND** sodium chloride 0.9 % flush 10 mL, 10 mL, Intravenous, PRN, Emilio Banegas MD  •  sodium chloride 0.9 % with KCl 20 mEq/L infusion, 50 mL/hr, Intravenous, Continuous, Lázaro Hirsch MD, Last Rate: 50 mL/hr at 11/08/21 1809, 50 mL/hr at  11/08/21 1809  •  tamsulosin (FLOMAX) 24 hr capsule 0.4 mg, 0.4 mg, Oral, Nightly, Mingo Hirsch MD, 0.4 mg at 11/08/21 2008     ASSESSMENT  CNS  bacteremia contamination   Acute UTI  Suprapubic catheter leakage  History of urine retention  History of prostate cancer  Large nonobstructing right kidney stone  Hypertension  Hyperlipidemia  Depression  Gastroesophageal reflux disease    PLAN  CPM  Discontinue IV fluid  Replacement potassium  Discontinue antibiotics and observe  Infectious disease consult appreciated  Urology consult appreciated  Continue home medications  Stress ulcer DVT prophylaxis  Supportive care  PT/OT  Discussed with nursing staff  Discharge planning    MINGO HIRSCH MD

## 2021-11-09 NOTE — NURSING NOTE
Large amount of drainage from around suprapubic catheter site, yellow in color.  Incontinence pads and linens changed multiple times during shift due to drainage. Catheter tubing and anchor position adjusted to better facilitate drainage into bag. Clean dry dressing currently in place over catheter site.

## 2021-11-10 VITALS
TEMPERATURE: 98.3 F | HEIGHT: 72 IN | RESPIRATION RATE: 16 BRPM | WEIGHT: 182.2 LBS | HEART RATE: 74 BPM | DIASTOLIC BLOOD PRESSURE: 72 MMHG | BODY MASS INDEX: 24.68 KG/M2 | OXYGEN SATURATION: 93 % | SYSTOLIC BLOOD PRESSURE: 162 MMHG

## 2021-11-10 LAB
ANION GAP SERPL CALCULATED.3IONS-SCNC: 12.4 MMOL/L (ref 5–15)
BASOPHILS # BLD AUTO: 0.06 10*3/MM3 (ref 0–0.2)
BASOPHILS NFR BLD AUTO: 0.8 % (ref 0–1.5)
BUN SERPL-MCNC: 5 MG/DL (ref 8–23)
BUN/CREAT SERPL: 13.5 (ref 7–25)
CALCIUM SPEC-SCNC: 8.7 MG/DL (ref 8.6–10.5)
CHLORIDE SERPL-SCNC: 107 MMOL/L (ref 98–107)
CO2 SERPL-SCNC: 18.6 MMOL/L (ref 22–29)
CREAT SERPL-MCNC: 0.37 MG/DL (ref 0.76–1.27)
DEPRECATED RDW RBC AUTO: 43.3 FL (ref 37–54)
EOSINOPHIL # BLD AUTO: 0.66 10*3/MM3 (ref 0–0.4)
EOSINOPHIL NFR BLD AUTO: 8.4 % (ref 0.3–6.2)
ERYTHROCYTE [DISTWIDTH] IN BLOOD BY AUTOMATED COUNT: 13.2 % (ref 12.3–15.4)
GFR SERPL CREATININE-BSD FRML MDRD: >150 ML/MIN/1.73
GLUCOSE SERPL-MCNC: 90 MG/DL (ref 65–99)
HCT VFR BLD AUTO: 39.8 % (ref 37.5–51)
HGB BLD-MCNC: 13.2 G/DL (ref 13–17.7)
IMM GRANULOCYTES # BLD AUTO: 0.04 10*3/MM3 (ref 0–0.05)
IMM GRANULOCYTES NFR BLD AUTO: 0.5 % (ref 0–0.5)
LYMPHOCYTES # BLD AUTO: 1.3 10*3/MM3 (ref 0.7–3.1)
LYMPHOCYTES NFR BLD AUTO: 16.5 % (ref 19.6–45.3)
MAGNESIUM SERPL-MCNC: 1.9 MG/DL (ref 1.6–2.4)
MCH RBC QN AUTO: 29.7 PG (ref 26.6–33)
MCHC RBC AUTO-ENTMCNC: 33.2 G/DL (ref 31.5–35.7)
MCV RBC AUTO: 89.4 FL (ref 79–97)
MONOCYTES # BLD AUTO: 0.8 10*3/MM3 (ref 0.1–0.9)
MONOCYTES NFR BLD AUTO: 10.1 % (ref 5–12)
NEUTROPHILS NFR BLD AUTO: 5.03 10*3/MM3 (ref 1.7–7)
NEUTROPHILS NFR BLD AUTO: 63.7 % (ref 42.7–76)
NRBC BLD AUTO-RTO: 0 /100 WBC (ref 0–0.2)
PLATELET # BLD AUTO: 330 10*3/MM3 (ref 140–450)
PMV BLD AUTO: 9.8 FL (ref 6–12)
POTASSIUM SERPL-SCNC: 3.5 MMOL/L (ref 3.5–5.2)
RBC # BLD AUTO: 4.45 10*6/MM3 (ref 4.14–5.8)
SODIUM SERPL-SCNC: 138 MMOL/L (ref 136–145)
WBC # BLD AUTO: 7.89 10*3/MM3 (ref 3.4–10.8)

## 2021-11-10 PROCEDURE — 94799 UNLISTED PULMONARY SVC/PX: CPT

## 2021-11-10 PROCEDURE — 80048 BASIC METABOLIC PNL TOTAL CA: CPT | Performed by: HOSPITALIST

## 2021-11-10 PROCEDURE — 85025 COMPLETE CBC W/AUTO DIFF WBC: CPT | Performed by: HOSPITALIST

## 2021-11-10 PROCEDURE — 83735 ASSAY OF MAGNESIUM: CPT | Performed by: HOSPITALIST

## 2021-11-10 PROCEDURE — 94761 N-INVAS EAR/PLS OXIMETRY MLT: CPT

## 2021-11-10 RX ORDER — TAMSULOSIN HYDROCHLORIDE 0.4 MG/1
0.4 CAPSULE ORAL NIGHTLY
Qty: 30 CAPSULE | Refills: 0 | Status: SHIPPED | OUTPATIENT
Start: 2021-11-10 | End: 2021-12-10

## 2021-11-10 RX ORDER — PRAVASTATIN SODIUM 10 MG
10 TABLET ORAL NIGHTLY
Qty: 30 TABLET | Refills: 0 | Status: SHIPPED | OUTPATIENT
Start: 2021-11-10 | End: 2021-12-10

## 2021-11-10 RX ORDER — POTASSIUM CHLORIDE 20 MEQ/1
20 TABLET, EXTENDED RELEASE ORAL
Qty: 90 TABLET | Refills: 0 | Status: SHIPPED | OUTPATIENT
Start: 2021-11-10 | End: 2021-12-10

## 2021-11-10 RX ORDER — PANTOPRAZOLE SODIUM 40 MG/1
40 TABLET, DELAYED RELEASE ORAL DAILY
Qty: 30 TABLET | Refills: 0 | Status: SHIPPED | OUTPATIENT
Start: 2021-11-11 | End: 2021-12-11

## 2021-11-10 RX ORDER — POTASSIUM CHLORIDE 750 MG/1
20 TABLET, FILM COATED, EXTENDED RELEASE ORAL
Status: DISCONTINUED | OUTPATIENT
Start: 2021-11-10 | End: 2021-11-10 | Stop reason: HOSPADM

## 2021-11-10 RX ADMIN — DIVALPROEX SODIUM 250 MG: 250 TABLET, DELAYED RELEASE ORAL at 08:27

## 2021-11-10 RX ADMIN — GLYCERIN, HYPROMELLOSE, POLYETHYLENE GLYCOL 1 DROP: .2; .2; 1 LIQUID OPHTHALMIC at 08:27

## 2021-11-10 RX ADMIN — ZINC OXIDE 1 APPLICATION: 200 OINTMENT TOPICAL at 08:27

## 2021-11-10 RX ADMIN — POTASSIUM CHLORIDE 20 MEQ: 750 TABLET, EXTENDED RELEASE ORAL at 08:27

## 2021-11-10 RX ADMIN — DIVALPROEX SODIUM 250 MG: 250 TABLET, DELAYED RELEASE ORAL at 19:52

## 2021-11-10 RX ADMIN — METOPROLOL TARTRATE 25 MG: 25 TABLET, FILM COATED ORAL at 08:27

## 2021-11-10 RX ADMIN — ZINC OXIDE 1 APPLICATION: 200 OINTMENT TOPICAL at 17:34

## 2021-11-10 RX ADMIN — POTASSIUM CHLORIDE 20 MEQ: 750 TABLET, EXTENDED RELEASE ORAL at 17:34

## 2021-11-10 RX ADMIN — METOPROLOL TARTRATE 25 MG: 25 TABLET, FILM COATED ORAL at 19:53

## 2021-11-10 RX ADMIN — PANTOPRAZOLE SODIUM 40 MG: 40 TABLET, DELAYED RELEASE ORAL at 08:27

## 2021-11-10 RX ADMIN — TAMSULOSIN HYDROCHLORIDE 0.4 MG: 0.4 CAPSULE ORAL at 19:52

## 2021-11-10 RX ADMIN — BUDESONIDE AND FORMOTEROL FUMARATE DIHYDRATE 2 PUFF: 80; 4.5 AEROSOL RESPIRATORY (INHALATION) at 07:34

## 2021-11-10 RX ADMIN — POLYETHYLENE GLYCOL 3350 17 G: 17 POWDER, FOR SOLUTION ORAL at 08:27

## 2021-11-10 NOTE — PLAN OF CARE
Goal Outcome Evaluation:  Plan of Care Reviewed With: patient        Progress: improving  Outcome Summary: Patient alert, oriented x 4, pleasant; VSS. Patient feels ready to return to facility. Acute issues have resolved; he continues with chronic wound on underside of penis and MASD on bottom, mana area which are improving. Plan for patient to return to facility this evening. Call placed to notify emergency contact; left message. Report called to [facility].

## 2021-11-10 NOTE — CASE MANAGEMENT/SOCIAL WORK
"Physicians Statement of Medical Necessity for  Ambulance Transportation    GENERAL INFORMATION     Name: Everett Emerson  YOB: 1940  Medicare #: [employer]vantage [id number]  Transport Date: 11/10/2021(Valid for round trips this date, or for scheduled repetitive trips for 60 days from the date signed below.)  Origin: [facility]  Destination: [facility]: [address] [place] [address]  Is the Patient's stay covered under Medicare Part A (PPS/DRG?)Yes  Closest appropriate facility? Yes  If this a hosp-hosp transfer? No  Is this a hospice patient? No    MEDICAL NECESSITY QUESTIONAIRE    Ambulance Transportation is medically necessary only if other means of transportation are contraindicated or would be potentially harmful to the patient.  To meet this requirement, the patient must be either \"bed confined\" or suffer from a condition such that transport by means other than an ambulance is contraindicated by the patient's condition.  The following questions must be answered by the healthcare professional signing below for this form to be valid:     1) Describe the MEDICAL CONDITION (physical and/or mental) of this patient AT THE TIME OF AMBULANCE TRANSPORT that requires the patient to be transported in an ambulance, and why transport by other means is contraindicated by the patient's condition:   Past Medical History:   Diagnosis Date   • Allergic rhinitis    • Anxiety    • Asthma    • BPH without obstruction/lower urinary tract symptoms    • Cancer ([facility])    • Cerebral palsy ([facility])    • Constipation    • COPD (chronic obstructive pulmonary disease) ([facility])    • Depression    • Diabetes mellitus ([facility])     type 2   • GERD (gastroesophageal reflux disease)    • Hyperlipidemia    • Hypertension    • Insomnia    • Mild cognitive impairment    • Mood disorder ([facility])    • Neuromuscular dysfunction of bladder    • Open wound of penis    • Osteoarthritis    • Spasmodic torticollis " "   • Xerosis cutis       Past Surgical History:   Procedure Laterality Date   • SUPRAPUBIC CATHETER INSERTION        2) Is this patient \"bed confined\" as defined below?Yes   To be \"bed confined\" the patient must satisfy all three of the following criteria:  (1) unable to get up from bed without assistance; AND (2) unable to ambulate;  AND (3) unable to sit in a chair or wheelchair.  3) Can this patient safely be transported by car or wheelchair van (I.e., may safely sit during transport, without an attendant or monitoring?)No   4. In addition to completing questions 1-3 above, please check any of the following conditions that apply*:          *Note: supporting documentation for any boxes checked must be maintained in the patient's medical records Contractures and Unable to tolerate seated position for time needed to transport      SIGNATURE OF PHYSICIAN OR OTHER AUTHORIZED HEALTHCARE PROFESSIONAL    I certify that the above information is true and correct based on my evaluation of this patient, and represent that the patient requires transport by ambulance and that other forms of transport are contraindicated.  I understand that this information will be used by the Centers for Medicare and Medicaid Services (CMS) to support the determiniation of medical necessity for ambulance services, and I represent that I have personal knowledge of the patient's condition at the time of transport.       If this box is checked, I also certify that the patient is physically or mentally incapable of signing the ambulance service's claim form and that the institution with which I am affiliated has furnished care, services or assistance to the patient.  My signature below is made on behalf of the patient pursuant to 42 .36(b)(4). In accordance with 42 .37, the specific reason(s) that the patient is physically or mentally incapable of signing the claim for is as follows:     Signature of Physician or Healthcare " Professional  Date/Time:        (For Scheduled repetitive transport, this form is not valid for transports performed more than 60 days after this date).                                                                                                                                            --------------------------------------------------------------------------------------------  Printed Name and Credentials of Physician or Authorized Healthcare Professional     *Form must be signed by patient's attending physician for scheduled, repetitive transports,.  For non-repetitive ambulance transports, if unable to obtain the signature of the attending physician, any of the following may sign (please select below):     Physician  Clinical Nurse Specialist  Registered Nurse     Physician Assistant  Discharge Planner  Licensed Practical Nurse     Nurse Practitioner

## 2021-11-10 NOTE — DISCHARGE SUMMARY
Discharge summary    Date of admission 11/6/2021  Date of discharge 11/10/2021    Final diagnosis    CNS  bacteremia contamination   Acute UTI completed antibiotics  Suprapubic catheter leakage  Urinary retention  History of prostate cancer  Large nonobstructing right kidney stone  Hypertension  Hyperlipidemia  Depression  Gastroesophageal reflux disease    Discharge medications    Current Facility-Administered Medications:   •  budesonide-formoterol (SYMBICORT) 80-4.5 MCG/ACT inhaler 2 puff, 2 puff, Inhalation, BID - RT, Lázaro Hirsch MD, 2 puff at 11/10/21 0734  •  divalproex (DEPAKOTE) DR tablet 250 mg, 250 mg, Oral, BID, Lázaro Hirsch MD, 250 mg at 11/10/21 0827  •  hydrocortisone-bacitracin-zinc oxide-nystatin (MAGIC BARRIER) ointment 1 application, 1 application, Topical, TID, Lázaro Hirsch MD, 1 application at 11/10/21 0827  •  metoprolol tartrate (LOPRESSOR) tablet 25 mg, 25 mg, Oral, Q12H, Lázaro Hirsch MD, 25 mg at 11/10/21 0827  •  pantoprazole (PROTONIX) EC tablet 40 mg, 40 mg, Oral, Daily, Lázaro Hirsch MD, 40 mg at 11/10/21 0827  •  polyethylene glycol (MIRALAX) packet 17 g, 17 g, Oral, Daily, Lázaro Hirsch MD, 17 g at 11/10/21 0827  •  potassium chloride (K-DUR,KLOR-CON) ER tablet 20 mEq, 20 mEq, Oral, TID With Meals, Lázaro Hirsch MD  •  pravastatin (PRAVACHOL) tablet 10 mg, 10 mg, Oral, Nightly, Lázaro Hirsch MD, 10 mg at 11/09/21 2058  •  [COMPLETED] Insert peripheral IV, , , Once **AND** sodium chloride 0.9 % flush 10 mL, 10 mL, Intravenous, PRN, Emilio Banegas MD  •  tamsulosin (FLOMAX) 24 hr capsule 0.4 mg, 0.4 mg, Oral, Nightly, Lázaro Hirsch MD, 0.4 mg at 11/09/21 2058     Consults obtained  Urology  Infectious disease    Procedures  Suprapubic catheter exchange    Hospital course  80-year-old white male with history of prostate cancer with urinary tension with suprapubic catheter in place admit through emergency room with abdominal pain.  Patient work-up revealed UTI with suprapubic  catheter leakage admit for management.  Patient admitted treated with empiric antibiotics after obtaining the cultures and urology consult obtained in the exchange the suprapubic catheter.  Patient urine cultures remains negative but wound culture showed gram-negative bong and blood cultures came back positive for CNS which is contamination.  Patient further evaluated by infectious disease and recommend to discontinue all antibiotics and observe and do the local wound care and symptomatic treatment.  Patient remains afebrile and feeling much better and clear for discharge    Discharge diet regular    Activity per PT OT    Medication as above    Further care per accepting physician at nursing home and follow-up with urology and infectious disease per the instruction and take medication as directed    MINGO MARAVILLA MD

## 2021-11-10 NOTE — PROGRESS NOTES
"Daily progress note    Chief complaint  Doing better  No new complaints  Wants to go back to nursing home    History of present illness  80-year-old male with history of asthma hypertension hyperlipidemia cerebral palsy depression who also have a chronic suprapubic catheter in place with history of prostate cancer presented to [facility] emergency room with abdominal pain.  Patient awake and alert he stated that his suprapubic catheter is has been leaking.  Patient denies any fever cough chest pain shortness of breath abdominal pain nausea vomiting diarrhea.  Patient evaluated in ER found to have UTI with suprapubic catheter leakage admit for management.  Patient is a poor historian unable to give detail history most of the history obtained from the chart nursing staff old record.     REVIEW OF SYSTEMS  Unremarkable      PHYSICAL EXAM  Blood pressure 171/84, pulse 81, temperature 97.4 °F (36.3 °C), temperature source Oral, resp. rate 18, height 182.9 cm (72\"), weight 82.6 kg (182 lb 3.2 oz), SpO2 91 %.    GENERAL: Awake and alert, nontoxic-appearing  HENT: nares patent  EYES: no scleral icterus  CV: regular rhythm, regular rate  RESPIRATORY: normal effort moving air bilaterally  ABDOMEN: soft, mild suprapubic tenderness to palpation.  There is a mature-looking suprapubic stoma with catheter in place.  There is no erythema, but it is tender to palpation around it with foul-smelling purulent discharge.  Bowel sounds positive  MUSCULOSKELETAL:  Chronic contractures  NEURO: alert, appears to be at his neurologic baseline  SKIN: warm, dry     LAB RESULTS  Lab Results (last 24 hours)     Procedure Component Value Units Date/Time    Basic Metabolic Panel [[id number]]  (Abnormal) Collected: 11/10/21 0647    Specimen: Blood Updated: 11/10/21 0830     Glucose 90 mg/dL      BUN 5 mg/dL      Creatinine 0.37 mg/dL      Sodium 138 mmol/L      Potassium 3.5 mmol/L      Chloride 107 mmol/L      CO2 18.6 mmol/L      " Calcium 8.7 mg/dL      eGFR Non African Amer >150 mL/min/1.73      BUN/Creatinine Ratio 13.5     Anion Gap 12.4 mmol/L     Narrative:      GFR Normal >60  Chronic Kidney Disease <60  Kidney Failure <15      Magnesium [[id number]]  (Normal) Collected: 11/10/21 0647    Specimen: Blood Updated: 11/10/21 0830     Magnesium 1.9 mg/dL     CBC & Differential [[id number]]  (Abnormal) Collected: 11/10/21 0647    Specimen: Blood Updated: 11/10/21 0805    Narrative:      The following orders were created for panel order CBC & Differential.  Procedure                               Abnormality         Status                     ---------                               -----------         ------                     CBC Auto Differential[[id number]]        Abnormal            Final result                 Please view results for these tests on the individual orders.    CBC Auto Differential [[id number]]  (Abnormal) Collected: 11/10/21 0647    Specimen: Blood Updated: 11/10/21 0805     WBC 7.89 10*3/mm3      RBC 4.45 10*6/mm3      Hemoglobin 13.2 g/dL      Hematocrit 39.8 %      MCV 89.4 fL      MCH 29.7 pg      MCHC 33.2 g/dL      RDW 13.2 %      RDW-SD 43.3 fl      MPV 9.8 fL      Platelets 330 10*3/mm3      Neutrophil % 63.7 %      Lymphocyte % 16.5 %      Monocyte % 10.1 %      Eosinophil % 8.4 %      Basophil % 0.8 %      Immature Grans % 0.5 %      Neutrophils, Absolute 5.03 10*3/mm3      Lymphocytes, Absolute 1.30 10*3/mm3      Monocytes, Absolute 0.80 10*3/mm3      Eosinophils, Absolute 0.66 10*3/mm3      Basophils, Absolute 0.06 10*3/mm3      Immature Grans, Absolute 0.04 10*3/mm3      nRBC 0.0 /100 WBC         Imaging Results (Last 24 Hours)     ** No results found for the last 24 hours. **          Current Facility-Administered Medications:   •  budesonide-formoterol (SYMBICORT) 80-4.5 MCG/ACT inhaler 2 puff, 2 puff, Inhalation, BID - RT, Lázaro Hirsch MD, 2 puff at 11/10/21 0734  •  divalproex (DEPAKOTE) DR tablet 250 mg, 250  mg, Oral, BID, Mingo Maravilla MD, 250 mg at 11/10/21 0827  •  Glycerin-Hypromellose- (ARTIFICIAL TEARS) 0.2-0.2-1 % ophthalmic solution solution 1 drop, 1 drop, Both Eyes, Q3H PRN, Mingo Maravilla MD, 1 drop at 11/10/21 0827  •  HYDROcodone-acetaminophen (NORCO) 5-325 MG per tablet 1 tablet, 1 tablet, Oral, Q4H PRN, Mingo Maravilla MD, 1 tablet at 11/09/21 0112  •  hydrocortisone-bacitracin-zinc oxide-nystatin (MAGIC BARRIER) ointment 1 application, 1 application, Topical, TID, Mingo Maravilla MD, 1 application at 11/10/21 0827  •  ipratropium-albuterol (DUO-NEB) nebulizer solution 3 mL, 3 mL, Nebulization, Q4H PRN, Mingo Maravilla MD  •  metoprolol tartrate (LOPRESSOR) tablet 25 mg, 25 mg, Oral, Q12H, Mingo Maravilla MD, 25 mg at 11/10/21 0827  •  pantoprazole (PROTONIX) EC tablet 40 mg, 40 mg, Oral, Daily, Mingo Maravilla MD, 40 mg at 11/10/21 0827  •  polyethylene glycol (MIRALAX) packet 17 g, 17 g, Oral, Daily, Mingo Maravilla MD, 17 g at 11/10/21 0827  •  potassium chloride (K-DUR,KLOR-CON) ER tablet 20 mEq, 20 mEq, Oral, BID With Meals, Mingo Maravilla MD, 20 mEq at 11/10/21 0827  •  pravastatin (PRAVACHOL) tablet 10 mg, 10 mg, Oral, Nightly, Mingo Maravilla MD, 10 mg at 11/09/21 2058  •  [COMPLETED] Insert peripheral IV, , , Once **AND** sodium chloride 0.9 % flush 10 mL, 10 mL, Intravenous, PRN, Emilio Banegas MD  •  tamsulosin (FLOMAX) 24 hr capsule 0.4 mg, 0.4 mg, Oral, Nightly, Mingo Maravilla MD, 0.4 mg at 11/09/21 2058     ASSESSMENT  CNS  bacteremia contamination   Acute UTI  Suprapubic catheter leakage  History of urine retention  History of prostate cancer  Large nonobstructing right kidney stone  Hypertension  Hyperlipidemia  Depression  Gastroesophageal reflux disease    PLAN  Discharge back to nursing home  Discharge summary dictated    MINGO MARAVILLA MD

## 2021-11-10 NOTE — PLAN OF CARE
Problem: Adult Inpatient Plan of Care  Goal: Plan of Care Review  Outcome: Ongoing, Progressing  Flowsheets (Taken 11/10/2021 0338)  Progress: improving  Plan of Care Reviewed With: patient  Outcome Summary: resting on and off w eyes closed, pt eager to go back to NH in am.  will cont to monitor, no distress noted.   Goal Outcome Evaluation:  Plan of Care Reviewed With: patient        Progress: improving  Outcome Summary: resting on and off w eyes closed, pt eager to go back to NH in am.  will cont to monitor, no distress noted.

## 2021-11-11 NOTE — PAYOR COMM NOTE
"Everett Emerson (80 y.o. Male)     ATTN: DISCHARGE SUMMARY FOR REVIEW:[id number]    UR DEPT: [phone],  [phone]               Date of Birth Social Security Number Address [place] Phone MRN    1940  [address] [facility] [address] [phone] [id number]    Worship Marital Status             [facility] Single       Admission Date Admission Type Admitting Provider Attending Provider Department, Room/Bed    11/6/21 Emergency Lázaro Hisrch MD  [facility] 5 EAST, E563/1    Discharge Date Discharge Disposition Discharge Destination          11/10/2021 Skilled Nursing Facility (DC - External)              Attending Provider: (none)   Allergies: No Known Allergies    Isolation: None   Infection: None   Code Status: Prior   Advance Care Planning Activity    Ht: 182.9 cm (72\")   Wt: 82.6 kg (182 lb 3.2 oz)    Admission Cmt: None   Principal Problem: None                Active Insurance as of 11/6/2021     Primary Coverage     Payor Plan Insurance Group Employer/Plan Group    SIGNATURE MEDICARE ADVANTAGE SIGNATURE ADVANTAGE NGN     Payor Plan Address Payor Plan Phone Number Payor Plan Fax Number Effective Dates    [address] [address] [phone]  4/1/2019 - None Entered    [facility] [id number]       Subscriber Name Subscriber Birth Date Member ID       EVERETT EMERSON 1940 [id number]           Secondary Coverage     Payor Plan Insurance Group Employer/Plan Group    [place]      Payor Plan Address Payor Plan Phone Number Payor Plan Fax Number Effective Dates    [address] [phone]  1/19/2017 - None Entered    [facility] [id number]       Subscriber Name Subscriber Birth Date Member ID       EVERETT EMERSON 1940 [id number]                 Emergency Contacts      (Rel.) Home Phone Work Phone Mobile Phone    Misty Stuart (Other) [phone] -- --    VAUGHN EMERSON(COUSIN) (Relative) [phone] -- [phone]             "   Discharge Summary      Lázaro Hirsch MD at 11/10/21 1342        Discharge summary    Date of admission 11/6/2021  Date of discharge 11/10/2021    Final diagnosis    CNS  bacteremia contamination   Acute UTI completed antibiotics  Suprapubic catheter leakage  Urinary retention  History of prostate cancer  Large nonobstructing right kidney stone  Hypertension  Hyperlipidemia  Depression  Gastroesophageal reflux disease    Discharge medications    Current Facility-Administered Medications:   •  budesonide-formoterol (SYMBICORT) 80-4.5 MCG/ACT inhaler 2 puff, 2 puff, Inhalation, BID - RT, Lázaro Hirsch MD, 2 puff at 11/10/21 0734  •  divalproex (DEPAKOTE) DR tablet 250 mg, 250 mg, Oral, BID, Lázaro Hirsch MD, 250 mg at 11/10/21 0827  •  hydrocortisone-bacitracin-zinc oxide-nystatin (MAGIC BARRIER) ointment 1 application, 1 application, Topical, TID, Lázaro Hirsch MD, 1 application at 11/10/21 0827  •  metoprolol tartrate (LOPRESSOR) tablet 25 mg, 25 mg, Oral, Q12H, Lázaro Hirsch MD, 25 mg at 11/10/21 0827  •  pantoprazole (PROTONIX) EC tablet 40 mg, 40 mg, Oral, Daily, Lázaro Hirsch MD, 40 mg at 11/10/21 0827  •  polyethylene glycol (MIRALAX) packet 17 g, 17 g, Oral, Daily, Lázaro Hirsch MD, 17 g at 11/10/21 0827  •  potassium chloride (K-DUR,KLOR-CON) ER tablet 20 mEq, 20 mEq, Oral, TID With Meals, Lázaro Hirsch MD  •  pravastatin (PRAVACHOL) tablet 10 mg, 10 mg, Oral, Nightly, Lázaro Hirsch MD, 10 mg at 11/09/21 2058  •  [COMPLETED] Insert peripheral IV, , , Once **AND** sodium chloride 0.9 % flush 10 mL, 10 mL, Intravenous, PRN, Emilio Banegas MD  •  tamsulosin (FLOMAX) 24 hr capsule 0.4 mg, 0.4 mg, Oral, Nightly, Lázaro Hirsch MD, 0.4 mg at 11/09/21 2058     Consults obtained  Urology  Infectious disease    Procedures  Suprapubic catheter exchange    Hospital course  80-year-old white male with history of prostate cancer with urinary tension with suprapubic catheter in place admit through emergency room with  abdominal pain.  Patient work-up revealed UTI with suprapubic catheter leakage admit for management.  Patient admitted treated with empiric antibiotics after obtaining the cultures and urology consult obtained in the exchange the suprapubic catheter.  Patient urine cultures remains negative but wound culture showed gram-negative bong and blood cultures came back positive for CNS which is contamination.  Patient further evaluated by infectious disease and recommend to discontinue all antibiotics and observe and do the local wound care and symptomatic treatment.  Patient remains afebrile and feeling much better and clear for discharge    Discharge diet regular    Activity per PT OT    Medication as above    Further care per accepting physician at nursing home and follow-up with urology and infectious disease per the instruction and take medication as directed    MINGO MARAVILLA MD    Electronically signed by Mingo Maravilla MD at 11/10/21 1347                   Tricia Beard, RN      Case Management   Case Management/Social Work   Signed   Date of Service:  11/10/21 2100   Creation Time:  11/11/21 0852              Signed                Show:Clear all  []Manual[x]Template[]Copied    Added by:  [x]Tricia Beard, RN      []Jacqueline for details    Case Management Discharge Note        Final Note: Pt discharged back to [facility].  Transported by [facility].  SENDY Beard RN     Provided Post Acute Provider List?: N/A  N/A Provider List Comment: facility resident  Provided Post Acute Provider Quality & Resource List?: N/A  N/A Quality & Resource List Comment: facility resident         Selected Continued Care - Discharged on 11/10/2021 Admission date: 11/6/2021 - Discharge disposition: Skilled Nursing Facility (DC - External)               Destination Coordination complete.               Service Provider Selected Services Address Phone Fax Patient Preferred      [facility]   Skilled Nursing [address] [facility] [address] [phone] --             Durable Medical Equipment    No services have been selected for the patient.                 Dialysis/Infusion    No services have been selected for the patient.                 Home Medical Care    No services have been selected for the patient.                 Therapy    No services have been selected for the patient.                 Community Resources    No services have been selected for the patient.                 Community & DME    No services have been selected for the patient.                      Transportation Services  Ambulance: Other ([facility])     Final Discharge Disposition Code: 04 - intermediate care facility

## 2021-11-11 NOTE — CASE MANAGEMENT/SOCIAL WORK
Case Management Discharge Note      Final Note: Pt discharged back to [facility].  Transported by [facility].  SENDY Beard RN    Provided Post Acute Provider List?: N/A  N/A Provider List Comment: facility resident  Provided Post Acute Provider Quality & Resource List?: N/A  N/A Quality & Resource List Comment: facility resident    Selected Continued Care - Discharged on 11/10/2021 Admission date: 11/6/2021 - Discharge disposition: Skilled Nursing Facility ([facility] - External)    Destination Coordination complete.    Service Provider Selected Services Address Phone Fax Patient Preferred    [facility]  Skilled Nursing [facility] [facility] [address] [phone] --          Durable Medical Equipment    No services have been selected for the patient.              Dialysis/Infusion    No services have been selected for the patient.              Home Medical Care    No services have been selected for the patient.              Therapy    No services have been selected for the patient.              Community Resources    No services have been selected for the patient.              Community & DME    No services have been selected for the patient.                  Transportation Services  Ambulance: Other ([facility])    Final Discharge Disposition Code: 04 - intermediate care facility

## 2021-12-04 ENCOUNTER — HOSPITAL ENCOUNTER (EMERGENCY)
Facility: HOSPITAL | Age: 81
Discharge: HOME | End: 2021-12-04
Attending: EMERGENCY MEDICINE | Admitting: EMERGENCY MEDICINE

## 2021-12-04 VITALS
RESPIRATION RATE: 17 BRPM | WEIGHT: 189 LBS | SYSTOLIC BLOOD PRESSURE: 149 MMHG | BODY MASS INDEX: 25.63 KG/M2 | OXYGEN SATURATION: 96 % | DIASTOLIC BLOOD PRESSURE: 87 MMHG | TEMPERATURE: 99 F | HEART RATE: 87 BPM

## 2021-12-04 DIAGNOSIS — R31.0 HEMATURIA, GROSS: Primary | ICD-10-CM

## 2021-12-04 LAB
ALBUMIN SERPL-MCNC: 3.8 G/DL (ref 3.5–5.2)
ALBUMIN/GLOB SERPL: 1.1 G/DL
ALP SERPL-CCNC: 128 U/L (ref 39–117)
ALT SERPL W P-5'-P-CCNC: 9 U/L (ref 1–41)
ANION GAP SERPL CALCULATED.3IONS-SCNC: 10.6 MMOL/L (ref 5–15)
APTT PPP: 30.1 SECONDS (ref 22.7–35.4)
AST SERPL-CCNC: 9 U/L (ref 1–40)
BACTERIA UR QL AUTO: ABNORMAL /HPF
BASOPHILS # BLD AUTO: 0.04 10*3/MM3 (ref 0–0.2)
BASOPHILS NFR BLD AUTO: 0.4 % (ref 0–1.5)
BILIRUB SERPL-MCNC: 0.3 MG/DL (ref 0–1.2)
BILIRUB UR QL STRIP: NEGATIVE
BUN SERPL-MCNC: 22 MG/DL (ref 8–23)
BUN/CREAT SERPL: 40.7 (ref 7–25)
CALCIUM SPEC-SCNC: 9 MG/DL (ref 8.6–10.5)
CHLORIDE SERPL-SCNC: 106 MMOL/L (ref 98–107)
CLARITY UR: ABNORMAL
CO2 SERPL-SCNC: 25.4 MMOL/L (ref 22–29)
COLOR UR: YELLOW
CREAT SERPL-MCNC: 0.54 MG/DL (ref 0.76–1.27)
DEPRECATED RDW RBC AUTO: 44.6 FL (ref 37–54)
EOSINOPHIL # BLD AUTO: 0.47 10*3/MM3 (ref 0–0.4)
EOSINOPHIL NFR BLD AUTO: 5 % (ref 0.3–6.2)
ERYTHROCYTE [DISTWIDTH] IN BLOOD BY AUTOMATED COUNT: 13.6 % (ref 12.3–15.4)
GFR SERPL CREATININE-BSD FRML MDRD: 146 ML/MIN/1.73
GLOBULIN UR ELPH-MCNC: 3.5 GM/DL
GLUCOSE SERPL-MCNC: 122 MG/DL (ref 65–99)
GLUCOSE UR STRIP-MCNC: NEGATIVE MG/DL
HCT VFR BLD AUTO: 44.8 % (ref 37.5–51)
HGB BLD-MCNC: 14.3 G/DL (ref 13–17.7)
HGB UR QL STRIP.AUTO: ABNORMAL
HYALINE CASTS UR QL AUTO: ABNORMAL /LPF
IMM GRANULOCYTES # BLD AUTO: 0.03 10*3/MM3 (ref 0–0.05)
IMM GRANULOCYTES NFR BLD AUTO: 0.3 % (ref 0–0.5)
INR PPP: 0.99 (ref 0.9–1.1)
KETONES UR QL STRIP: NEGATIVE
LEUKOCYTE ESTERASE UR QL STRIP.AUTO: ABNORMAL
LYMPHOCYTES # BLD AUTO: 1.24 10*3/MM3 (ref 0.7–3.1)
LYMPHOCYTES NFR BLD AUTO: 13.1 % (ref 19.6–45.3)
MCH RBC QN AUTO: 28.7 PG (ref 26.6–33)
MCHC RBC AUTO-ENTMCNC: 31.9 G/DL (ref 31.5–35.7)
MCV RBC AUTO: 90 FL (ref 79–97)
MONOCYTES # BLD AUTO: 0.97 10*3/MM3 (ref 0.1–0.9)
MONOCYTES NFR BLD AUTO: 10.2 % (ref 5–12)
NEUTROPHILS NFR BLD AUTO: 6.74 10*3/MM3 (ref 1.7–7)
NEUTROPHILS NFR BLD AUTO: 71 % (ref 42.7–76)
NITRITE UR QL STRIP: POSITIVE
NRBC BLD AUTO-RTO: 0 /100 WBC (ref 0–0.2)
PH UR STRIP.AUTO: 7 [PH] (ref 5–8)
PLATELET # BLD AUTO: 283 10*3/MM3 (ref 140–450)
PMV BLD AUTO: 9.5 FL (ref 6–12)
POTASSIUM SERPL-SCNC: 4.1 MMOL/L (ref 3.5–5.2)
PROT SERPL-MCNC: 7.3 G/DL (ref 6–8.5)
PROT UR QL STRIP: ABNORMAL
PROTHROMBIN TIME: 12.9 SECONDS (ref 11.7–14.2)
RBC # BLD AUTO: 4.98 10*6/MM3 (ref 4.14–5.8)
RBC # UR STRIP: ABNORMAL /HPF
REF LAB TEST METHOD: ABNORMAL
SODIUM SERPL-SCNC: 142 MMOL/L (ref 136–145)
SP GR UR STRIP: 1.02 (ref 1–1.03)
SQUAMOUS #/AREA URNS HPF: ABNORMAL /HPF
UROBILINOGEN UR QL STRIP: ABNORMAL
WBC # UR STRIP: ABNORMAL /HPF
WBC NRBC COR # BLD: 9.49 10*3/MM3 (ref 3.4–10.8)

## 2021-12-04 PROCEDURE — 99283 EMERGENCY DEPT VISIT LOW MDM: CPT

## 2021-12-04 PROCEDURE — 87077 CULTURE AEROBIC IDENTIFY: CPT | Performed by: EMERGENCY MEDICINE

## 2021-12-04 PROCEDURE — 81001 URINALYSIS AUTO W/SCOPE: CPT | Performed by: EMERGENCY MEDICINE

## 2021-12-04 PROCEDURE — 80053 COMPREHEN METABOLIC PANEL: CPT | Performed by: EMERGENCY MEDICINE

## 2021-12-04 PROCEDURE — 87086 URINE CULTURE/COLONY COUNT: CPT | Performed by: EMERGENCY MEDICINE

## 2021-12-04 PROCEDURE — 85610 PROTHROMBIN TIME: CPT | Performed by: EMERGENCY MEDICINE

## 2021-12-04 PROCEDURE — 85025 COMPLETE CBC W/AUTO DIFF WBC: CPT | Performed by: EMERGENCY MEDICINE

## 2021-12-04 PROCEDURE — 87186 SC STD MICRODIL/AGAR DIL: CPT | Performed by: EMERGENCY MEDICINE

## 2021-12-04 PROCEDURE — 85730 THROMBOPLASTIN TIME PARTIAL: CPT | Performed by: EMERGENCY MEDICINE

## 2021-12-04 RX ORDER — CIPROFLOXACIN 500 MG/1
500 TABLET, FILM COATED ORAL 2 TIMES DAILY
Qty: 14 TABLET | Refills: 0 | OUTPATIENT
Start: 2021-12-04 | End: 2022-03-20

## 2021-12-04 RX ORDER — CIPROFLOXACIN 500 MG/1
500 TABLET, FILM COATED ORAL 2 TIMES DAILY
Qty: 14 TABLET | Refills: 0 | Status: SHIPPED | OUTPATIENT
Start: 2021-12-04 | End: 2021-12-04 | Stop reason: SDUPTHER

## 2021-12-04 NOTE — ED NOTES
EMS from [facility] for kidney stones. Pt has an indwelling charlton and had blood in bag. Pt has a scheduled kidney stone removal surg on Monday and needs to be cleared for surgery. A/ox4    Patient was placed in face mask during first look triage.  Patient was wearing a face mask throughout encounter.  I wore all personal protective equipment including N95 mask, goggles, and gloves throughout the encounter.  Hand hygiene was performed before and after patient encounter.         Violet Patel RN  12/04/21 2362

## 2021-12-05 NOTE — ED PROVIDER NOTES
EMERGENCY DEPARTMENT ENCOUNTER    Room Number:  09/09  Date of encounter:  12/4/2021  PCP: Jackson Marlow Jr., MD  Historian: Patient and nursing home record      HPI:  Chief Complaint: Hematuria  A complete HPI/ROS/PMH/PSH/SH/FH are unobtainable due to: Cerebral palsy    Context: Everett Emerson is a 81 y.o. male who presents to the ED c/o hematuria in the Skaggs catheter earlier today.  Patient has a chronic indwelling Skaggs catheter, active kidney stones for which he has surgery planned Monday, and presented today because he had gross hematuria in the catheter.  He had absolutely no pain or other symptoms.  Patient has no complaints      PAST MEDICAL HISTORY  Active Ambulatory Problems     Diagnosis Date Noted   • Depression with suicidal ideation 01/19/2017   • Slow transit constipation 01/20/2017   • Hypertension 01/20/2017   • Cerebral palsy (HCC) 01/20/2017   • Acute UTI 11/06/2021     Resolved Ambulatory Problems     Diagnosis Date Noted   • No Resolved Ambulatory Problems     Past Medical History:   Diagnosis Date   • Allergic rhinitis    • Anxiety    • Asthma    • BPH without obstruction/lower urinary tract symptoms    • Cancer ([facility])    • Constipation    • COPD (chronic obstructive pulmonary disease) ([facility])    • Depression    • Diabetes mellitus ([facility])    • GERD (gastroesophageal reflux disease)    • Hyperlipidemia    • Insomnia    • Mild cognitive impairment    • Mood disorder ([facility])    • Neuromuscular dysfunction of bladder    • Open wound of penis    • Osteoarthritis    • Spasmodic torticollis    • Xerosis cutis          PAST SURGICAL HISTORY  Past Surgical History:   Procedure Laterality Date   • SUPRAPUBIC CATHETER INSERTION           FAMILY HISTORY  No family history on file.      SOCIAL HISTORY  Social History     Socioeconomic History   • Marital status: Single   Tobacco Use   • Smoking status: Never Smoker   • Smokeless tobacco: Never Used   Substance and Sexual Activity   • Alcohol use: No   • Drug  use: No         ALLERGIES  Patient has no known allergies.        REVIEW OF SYSTEMS  Review of Systems   Limited due to cerebral palsy      PHYSICAL EXAM    I have reviewed the triage vital signs and nursing notes.    ED Triage Vitals [12/04/21 1802]   Temp Heart Rate Resp BP SpO2   99 °F (37.2 °C) 93 17 152/97 97 %      Temp src Heart Rate Source Patient Position BP Location FiO2 (%)   -- -- -- -- --       Physical Exam  GENERAL: Awake and alert, very pleasant and oriented  HENT: nares patent  EYES: no scleral icterus  CV: regular rhythm, regular rate  RESPIRATORY: normal effort  ABDOMEN: soft, nondistended and nontender to palpation with no rebound or guarding.  Bladder scan shows 0 mL in the urinary bladder.  Skaggs catheter is in position and at this point has some cloudy urine but no gross hematuria is present.  The triage team was able to irrigate the catheter with a Ana Rosa syringe and it began draining properly and the hematuria cleared  MUSCULOSKELETAL: Chronic contractures  NEURO: Cerebral palsy with extremity contractures and some cognitive deficits however nothing new  SKIN: warm, dry        LAB RESULTS  Recent Results (from the past 24 hour(s))   Comprehensive Metabolic Panel    Collection Time: 12/04/21  8:20 PM    Specimen: Blood   Result Value Ref Range    Glucose 122 (H) 65 - 99 mg/dL    BUN 22 8 - 23 mg/dL    Creatinine 0.54 (L) 0.76 - 1.27 mg/dL    Sodium 142 136 - 145 mmol/L    Potassium 4.1 3.5 - 5.2 mmol/L    Chloride 106 98 - 107 mmol/L    CO2 25.4 22.0 - 29.0 mmol/L    Calcium 9.0 8.6 - 10.5 mg/dL    Total Protein 7.3 6.0 - 8.5 g/dL    Albumin 3.80 3.50 - 5.20 g/dL    ALT (SGPT) 9 1 - 41 U/L    AST (SGOT) 9 1 - 40 U/L    Alkaline Phosphatase 128 (H) 39 - 117 U/L    Total Bilirubin 0.3 0.0 - 1.2 mg/dL    eGFR Non African Amer 146 >60 mL/min/1.73    Globulin 3.5 gm/dL    A/G Ratio 1.1 g/dL    BUN/Creatinine Ratio 40.7 (H) 7.0 - 25.0    Anion Gap 10.6 5.0 - 15.0 mmol/L   Protime-INR    Collection  Time: 12/04/21  8:20 PM    Specimen: Blood   Result Value Ref Range    Protime 12.9 11.7 - 14.2 Seconds    INR 0.99 0.90 - 1.10   aPTT    Collection Time: 12/04/21  8:20 PM    Specimen: Blood   Result Value Ref Range    PTT 30.1 22.7 - 35.4 seconds   CBC Auto Differential    Collection Time: 12/04/21  8:20 PM    Specimen: Blood   Result Value Ref Range    WBC 9.49 3.40 - 10.80 10*3/mm3    RBC 4.98 4.14 - 5.80 10*6/mm3    Hemoglobin 14.3 13.0 - 17.7 g/dL    Hematocrit 44.8 37.5 - 51.0 %    MCV 90.0 79.0 - 97.0 fL    MCH 28.7 26.6 - 33.0 pg    MCHC 31.9 31.5 - 35.7 g/dL    RDW 13.6 12.3 - 15.4 %    RDW-SD 44.6 37.0 - 54.0 fl    MPV 9.5 6.0 - 12.0 fL    Platelets 283 140 - 450 10*3/mm3    Neutrophil % 71.0 42.7 - 76.0 %    Lymphocyte % 13.1 (L) 19.6 - 45.3 %    Monocyte % 10.2 5.0 - 12.0 %    Eosinophil % 5.0 0.3 - 6.2 %    Basophil % 0.4 0.0 - 1.5 %    Immature Grans % 0.3 0.0 - 0.5 %    Neutrophils, Absolute 6.74 1.70 - 7.00 10*3/mm3    Lymphocytes, Absolute 1.24 0.70 - 3.10 10*3/mm3    Monocytes, Absolute 0.97 (H) 0.10 - 0.90 10*3/mm3    Eosinophils, Absolute 0.47 (H) 0.00 - 0.40 10*3/mm3    Basophils, Absolute 0.04 0.00 - 0.20 10*3/mm3    Immature Grans, Absolute 0.03 0.00 - 0.05 10*3/mm3    nRBC 0.0 0.0 - 0.2 /100 WBC   Urinalysis With Culture If Indicated - Urine, Catheter    Collection Time: 12/04/21  9:22 PM    Specimen: Urine, Catheter   Result Value Ref Range    Color, UA Yellow Yellow, Straw    Appearance, UA Cloudy (A) Clear    pH, UA 7.0 5.0 - 8.0    Specific Gravity, UA 1.020 1.005 - 1.030    Glucose, UA Negative Negative    Ketones, UA Negative Negative    Bilirubin, UA Negative Negative    Blood, UA Moderate (2+) (A) Negative    Protein, UA 30 mg/dL (1+) (A) Negative    Leuk Esterase, UA Large (3+) (A) Negative    Nitrite, UA Positive (A) Negative    Urobilinogen, UA 2.0 E.U./dL (A) 0.2 - 1.0 E.U./dL   Urinalysis, Microscopic Only - Urine, Catheter    Collection Time: 12/04/21  9:22 PM    Specimen:  Urine, Catheter   Result Value Ref Range    RBC, UA 31-50 (A) None Seen, 0-2 /HPF    WBC, UA Too Numerous to Count (A) None Seen, 0-2 /HPF    Bacteria, UA 1+ (A) None Seen /HPF    Squamous Epithelial Cells, UA None Seen None Seen, 0-2 /HPF    Hyaline Casts, UA None Seen None Seen /LPF    Methodology Automated Microscopy        Ordered the above labs and independently reviewed the results.        RADIOLOGY  No Radiology Exams Resulted Within Past 24 Hours    I ordered the above noted radiological studies. Reviewed by me and discussed with radiologist.  See dictation for official radiology interpretation.      PROCEDURES    Procedures      MEDICATIONS GIVEN IN ER    Medications - No data to display      PROGRESS, DATA ANALYSIS, CONSULTS, AND MEDICAL DECISION MAKING    All labs have been independently reviewed by me.  All radiology studies have been reviewed by me and discussed with radiologist dictating the report.   EKG's independently viewed and interpreted by me.  Discussion below represents my analysis of pertinent findings related to patient's condition, differential diagnosis, treatment plan and final disposition.        ED Course as of 12/04/21 2347   Sat Dec 04, 2021   2346 CBC and chemistry are normal [DP]   2346 Cath UA shows too numerous to count white cells, 30-50 reds and 1+ bacteria.  Given that it is an indwelling Skaggs this could represent contaminant, however given the new hematuria we will treat and await the culture results. [DP]   2347 Patient is having no pain from his kidney stones and from what I understand is scheduled to see the urologist early this week [DP]      ED Course User Index  [DP] Emilio Banegas MD           PPE: The patient wore a surgical mask throughout the entire patient encounter. I wore an N95.    AS OF 23:47 EST VITALS:    BP - 149/87  HR - 87  TEMP - 99 °F (37.2 °C)  O2 SATS - 96%        DIAGNOSIS  Final diagnoses:   Hematuria, gross         DISPOSITION  Discharge            Emilio Banegas MD  12/04/21 [id number]

## 2021-12-05 NOTE — CASE MANAGEMENT/SOCIAL WORK
Call placed to [facility] to schedule transport [facility]; spoke w/Kieran who took a message and provided charge RN number for call back. Cassidy Purcell RN  Notified caitie Partick RN. Cassidy Purcell RN

## 2021-12-05 NOTE — ED NOTES
Manual catheter irrigation completed. No clots noted. Urine pink initially then clear on subsequent irrigation. Urine clear with good flow. Pt's residual bladder scan shows 0mL in the bladder. Pt denies any pain at this time. Urinary catheter care completed at this time.    This RN in appropriate PPE for all patient care interactions. Pt masked and redirected for proper mask use when necessary. Hand hygiene performed before and after all patient care interactions.       Anival Garcia, RN  12/04/21 2017

## 2021-12-07 LAB
BACTERIA SPEC AEROBE CULT: ABNORMAL
BACTERIA SPEC AEROBE CULT: ABNORMAL

## 2022-03-20 ENCOUNTER — HOSPITAL ENCOUNTER (EMERGENCY)
Facility: HOSPITAL | Age: 82
Discharge: NURSING, REHAB OR LONG-TERM CARE | End: 2022-03-20
Attending: EMERGENCY MEDICINE | Admitting: EMERGENCY MEDICINE

## 2022-03-20 ENCOUNTER — APPOINTMENT (OUTPATIENT)
Dept: CT IMAGING | Facility: HOSPITAL | Age: 82
End: 2022-03-20

## 2022-03-20 VITALS
DIASTOLIC BLOOD PRESSURE: 72 MMHG | RESPIRATION RATE: 16 BRPM | HEART RATE: 65 BPM | SYSTOLIC BLOOD PRESSURE: 138 MMHG | TEMPERATURE: 98.6 F | OXYGEN SATURATION: 96 %

## 2022-03-20 DIAGNOSIS — N39.0 URINARY TRACT INFECTION ASSOCIATED WITH CYSTOSTOMY CATHETER, INITIAL ENCOUNTER: ICD-10-CM

## 2022-03-20 DIAGNOSIS — T83.510A URINARY TRACT INFECTION ASSOCIATED WITH CYSTOSTOMY CATHETER, INITIAL ENCOUNTER: ICD-10-CM

## 2022-03-20 DIAGNOSIS — T83.091A: Primary | ICD-10-CM

## 2022-03-20 LAB
ALBUMIN SERPL-MCNC: 4.2 G/DL (ref 3.5–5.2)
ALBUMIN/GLOB SERPL: 1.2 G/DL
ALP SERPL-CCNC: 168 U/L (ref 39–117)
ALT SERPL W P-5'-P-CCNC: 10 U/L (ref 1–41)
ANION GAP SERPL CALCULATED.3IONS-SCNC: 10 MMOL/L (ref 5–15)
AST SERPL-CCNC: 11 U/L (ref 1–40)
BACTERIA UR QL AUTO: ABNORMAL /HPF
BASOPHILS # BLD AUTO: 0.06 10*3/MM3 (ref 0–0.2)
BASOPHILS NFR BLD AUTO: 0.7 % (ref 0–1.5)
BILIRUB SERPL-MCNC: 0.4 MG/DL (ref 0–1.2)
BILIRUB UR QL STRIP: NEGATIVE
BUN SERPL-MCNC: 19 MG/DL (ref 8–23)
BUN/CREAT SERPL: 33.3 (ref 7–25)
CALCIUM SPEC-SCNC: 9.3 MG/DL (ref 8.6–10.5)
CHLORIDE SERPL-SCNC: 102 MMOL/L (ref 98–107)
CLARITY UR: ABNORMAL
CO2 SERPL-SCNC: 28 MMOL/L (ref 22–29)
COLOR UR: YELLOW
CREAT SERPL-MCNC: 0.57 MG/DL (ref 0.76–1.27)
DEPRECATED RDW RBC AUTO: 45.4 FL (ref 37–54)
EGFRCR SERPLBLD CKD-EPI 2021: 99.1 ML/MIN/1.73
EOSINOPHIL # BLD AUTO: 0.21 10*3/MM3 (ref 0–0.4)
EOSINOPHIL NFR BLD AUTO: 2.3 % (ref 0.3–6.2)
ERYTHROCYTE [DISTWIDTH] IN BLOOD BY AUTOMATED COUNT: 14.5 % (ref 12.3–15.4)
GLOBULIN UR ELPH-MCNC: 3.6 GM/DL
GLUCOSE SERPL-MCNC: 112 MG/DL (ref 65–99)
GLUCOSE UR STRIP-MCNC: NEGATIVE MG/DL
HCT VFR BLD AUTO: 50.1 % (ref 37.5–51)
HGB BLD-MCNC: 15.5 G/DL (ref 13–17.7)
HGB UR QL STRIP.AUTO: ABNORMAL
HOLD SPECIMEN: NORMAL
HOLD SPECIMEN: NORMAL
HYALINE CASTS UR QL AUTO: ABNORMAL /LPF
IMM GRANULOCYTES # BLD AUTO: 0.03 10*3/MM3 (ref 0–0.05)
IMM GRANULOCYTES NFR BLD AUTO: 0.3 % (ref 0–0.5)
KETONES UR QL STRIP: ABNORMAL
LEUKOCYTE ESTERASE UR QL STRIP.AUTO: ABNORMAL
LYMPHOCYTES # BLD AUTO: 1.24 10*3/MM3 (ref 0.7–3.1)
LYMPHOCYTES NFR BLD AUTO: 13.6 % (ref 19.6–45.3)
MCH RBC QN AUTO: 26.9 PG (ref 26.6–33)
MCHC RBC AUTO-ENTMCNC: 30.9 G/DL (ref 31.5–35.7)
MCV RBC AUTO: 86.8 FL (ref 79–97)
MONOCYTES # BLD AUTO: 0.87 10*3/MM3 (ref 0.1–0.9)
MONOCYTES NFR BLD AUTO: 9.5 % (ref 5–12)
NEUTROPHILS NFR BLD AUTO: 6.7 10*3/MM3 (ref 1.7–7)
NEUTROPHILS NFR BLD AUTO: 73.6 % (ref 42.7–76)
NITRITE UR QL STRIP: POSITIVE
NRBC BLD AUTO-RTO: 0 /100 WBC (ref 0–0.2)
PH UR STRIP.AUTO: 6.5 [PH] (ref 5–8)
PLATELET # BLD AUTO: 301 10*3/MM3 (ref 140–450)
PMV BLD AUTO: 9.9 FL (ref 6–12)
POTASSIUM SERPL-SCNC: 4.5 MMOL/L (ref 3.5–5.2)
PROT SERPL-MCNC: 7.8 G/DL (ref 6–8.5)
PROT UR QL STRIP: ABNORMAL
RBC # BLD AUTO: 5.77 10*6/MM3 (ref 4.14–5.8)
RBC # UR STRIP: ABNORMAL /HPF
REF LAB TEST METHOD: ABNORMAL
SODIUM SERPL-SCNC: 140 MMOL/L (ref 136–145)
SP GR UR STRIP: 1.02 (ref 1–1.03)
SQUAMOUS #/AREA URNS HPF: ABNORMAL /HPF
UROBILINOGEN UR QL STRIP: ABNORMAL
VALPROATE SERPL-MCNC: 39.3 MCG/ML (ref 50–125)
WBC # UR STRIP: ABNORMAL /HPF
WBC NRBC COR # BLD: 9.11 10*3/MM3 (ref 3.4–10.8)
WHOLE BLOOD HOLD SPECIMEN: NORMAL
WHOLE BLOOD HOLD SPECIMEN: NORMAL

## 2022-03-20 PROCEDURE — 99284 EMERGENCY DEPT VISIT MOD MDM: CPT

## 2022-03-20 PROCEDURE — 80053 COMPREHEN METABOLIC PANEL: CPT | Performed by: EMERGENCY MEDICINE

## 2022-03-20 PROCEDURE — 81001 URINALYSIS AUTO W/SCOPE: CPT | Performed by: EMERGENCY MEDICINE

## 2022-03-20 PROCEDURE — 87086 URINE CULTURE/COLONY COUNT: CPT | Performed by: EMERGENCY MEDICINE

## 2022-03-20 PROCEDURE — 74176 CT ABD & PELVIS W/O CONTRAST: CPT

## 2022-03-20 PROCEDURE — 87186 SC STD MICRODIL/AGAR DIL: CPT | Performed by: EMERGENCY MEDICINE

## 2022-03-20 PROCEDURE — 80164 ASSAY DIPROPYLACETIC ACD TOT: CPT | Performed by: EMERGENCY MEDICINE

## 2022-03-20 PROCEDURE — 85025 COMPLETE CBC W/AUTO DIFF WBC: CPT | Performed by: EMERGENCY MEDICINE

## 2022-03-20 PROCEDURE — P9612 CATHETERIZE FOR URINE SPEC: HCPCS

## 2022-03-20 PROCEDURE — 87088 URINE BACTERIA CULTURE: CPT | Performed by: EMERGENCY MEDICINE

## 2022-03-20 RX ORDER — SODIUM CHLORIDE 0.9 % (FLUSH) 0.9 %
10 SYRINGE (ML) INJECTION AS NEEDED
Status: DISCONTINUED | OUTPATIENT
Start: 2022-03-20 | End: 2022-03-20 | Stop reason: HOSPADM

## 2022-03-20 RX ORDER — SULFAMETHOXAZOLE AND TRIMETHOPRIM 800; 160 MG/1; MG/1
1 TABLET ORAL 2 TIMES DAILY
Qty: 14 TABLET | Refills: 0 | Status: SHIPPED | OUTPATIENT
Start: 2022-03-20 | End: 2022-03-27

## 2022-03-20 RX ORDER — SULFAMETHOXAZOLE AND TRIMETHOPRIM 800; 160 MG/1; MG/1
1 TABLET ORAL ONCE
Status: COMPLETED | OUTPATIENT
Start: 2022-03-20 | End: 2022-03-20

## 2022-03-20 RX ORDER — SULFAMETHOXAZOLE AND TRIMETHOPRIM 800; 160 MG/1; MG/1
1 TABLET ORAL 2 TIMES DAILY
Qty: 14 TABLET | Refills: 0 | Status: SHIPPED | OUTPATIENT
Start: 2022-03-20 | End: 2022-03-20 | Stop reason: SDUPTHER

## 2022-03-20 RX ADMIN — SULFAMETHOXAZOLE AND TRIMETHOPRIM 1 TABLET: 800; 160 TABLET ORAL at 17:47

## 2022-03-20 RX ADMIN — SODIUM CHLORIDE 500 ML: 9 INJECTION, SOLUTION INTRAVENOUS at 15:38

## 2022-03-20 NOTE — CASE MANAGEMENT/SOCIAL WORK
Return call received from [facility] to inform of ambulance ETA of 2200. TOM GutierrezN RN

## 2022-03-20 NOTE — DISCHARGE INSTRUCTIONS
You are advised to follow closely with Dr. James and Dr. Giles or urologist of your choice in 2-3 days for recheck, urine culture results, final results of lab work and imaging testing, and further testing/treatment as needed.    Change antibiotics as needed depending on urine culture/sensitivity testing.    Drink plenty of fluids  Finish complete Bactrim prescription unless it is determined by culture results that antibiotics need to be different.    Take probiotics daily during and for at least 2 weeks after antibiotic use.    Routine suprapubic catheter care    Please return to the emergency department immediately with chest pain different than usual for you, shortness of air, abdominal pain, persistent vomiting/fever, blood in emesis or stool, lightheadedness/fainting, problems with speech, one sided weakness/numbness, new incontinence, problems with vision,  or for worsening of symptoms or other concerns.

## 2022-03-20 NOTE — ED NOTES
TIGIST Rashid, attempted to flush the f/c without success.     Called Distribution for a replacement catheter.

## 2022-03-20 NOTE — ED NOTES
Pt is alert, reports some pain in lower abdomen. Pt abd is very hard. Pt has a suprapubic cath, . It is unknown the last time patient voided. No urine in bag or tubing at this time.     Bladder scanner was used but no clear image of bladder seen. Unable to obtain urine amount. MD aware.     This rn wearing mask and goggles. Pt wearing mask during encounter.

## 2022-03-20 NOTE — ED TRIAGE NOTES
Pt from [facility] [place] states has distended bladder has charlton in place and not draining tried to deflate balloon states unable to do so.

## 2022-03-20 NOTE — CASE MANAGEMENT/SOCIAL WORK
Contacted [facility] dispatch to arrange ambulance transport back to [facility]. Awaiting return call with TOM BanerjeeN RN

## 2022-03-20 NOTE — ED PROVIDER NOTES
EMERGENCY DEPARTMENT ENCOUNTER    CHIEF COMPLAINT  Chief Complaint: Decreased urine output  History given by: EMS  History limited by: Patient history of dementia  Room Number: EDWR/WR  PMD: Kashmir, Jackson CUNNINGHAM Jr., MD  Urologist: Dr. Kemar Giles  Pulmonologist: Dr. Ashwin Toscano    HPI:  Pt is a 81 y.o. male with chronic indwelling suprapubic catheter, sent from [facility] is lower by EMS with report of decreased urine output.  Patient denies headache, chest pain, shortness of air, cough, fever, does report some lower abdominal discomfort and feeling of bladder fullness.    Duration: Today  Associated Symptoms: Bladder fullness  Aggravating Factors: Unknown  Alleviating Factors: Nothing  Treatment before arrival: EMS transport    Upon review of patient's chart it is noted:  Patient had labs drawn 3/7/2022 with white blood cell count 10.6, hemoglobin 10.5, platelets 390.  1/28/2022 patient's creatinine was 0.4, BUN 13, sodium 140, potassium 4.1, carbon dioxide 27 blood sugar 100  Patient with a history of COPD, cerebral palsy, type 2 diabetes, neuromuscular dysfunction of bladder dysphagia dementia, ulcer of buttocks.  Patient with prostate cancer, status post treatment in 2014, chronic urinary retention with suprapubic catheter      PAST MEDICAL HISTORY  Active Ambulatory Problems     Diagnosis Date Noted   • Depression with suicidal ideation 01/19/2017   • Slow transit constipation 01/20/2017   • Hypertension 01/20/2017   • Cerebral palsy (HCC) 01/20/2017   • Acute UTI 11/06/2021     Resolved Ambulatory Problems     Diagnosis Date Noted   • No Resolved Ambulatory Problems     Past Medical History:   Diagnosis Date   • Allergic rhinitis    • Anxiety    • Asthma    • BPH without obstruction/lower urinary tract symptoms    • Cancer (HCC)    • Constipation    • COPD (chronic obstructive pulmonary disease) ([facility])    • Depression    • Diabetes mellitus (HCC)    • GERD (gastroesophageal reflux disease)    •  Hyperlipidemia    • Insomnia    • Mild cognitive impairment    • Mood disorder (HCC)    • Neuromuscular dysfunction of bladder    • Open wound of penis    • Osteoarthritis    • Spasmodic torticollis    • Xerosis cutis        PAST SURGICAL HISTORY  Past Surgical History:   Procedure Laterality Date   • SUPRAPUBIC CATHETER INSERTION         FAMILY HISTORY  No family history on file.    SOCIAL HISTORY  Social History     Socioeconomic History   • Marital status: Single   Tobacco Use   • Smoking status: Never Smoker   • Smokeless tobacco: Never Used   Substance and Sexual Activity   • Alcohol use: No   • Drug use: No       ALLERGIES  Patient has no known allergies.    REVIEW OF SYSTEMS  Review of Systems   Constitutional: Negative for chills and fever.   HENT: Negative for sore throat and trouble swallowing.    Eyes: Negative for visual disturbance.   Respiratory: Negative for cough and shortness of breath.    Cardiovascular: Negative for chest pain and leg swelling.   Gastrointestinal: Positive for abdominal pain. Negative for diarrhea and vomiting.   Endocrine: Negative.    Genitourinary: Positive for decreased urine volume. Negative for frequency.   Musculoskeletal: Negative for neck pain.   Skin: Negative for rash.   Allergic/Immunologic: Negative.    Neurological: Negative for weakness and numbness.   Hematological: Negative.    Psychiatric/Behavioral: Negative.    All other systems reviewed and are negative.      PHYSICAL EXAM  ED Triage Vitals [03/20/22 1152]   Temp Heart Rate Resp BP SpO2   98.6 °F (37 °C) 58 18 147/75 100 %      Temp src Heart Rate Source Patient Position BP Location FiO2 (%)   -- -- -- -- --       Physical Exam  Vitals and nursing note reviewed.   Constitutional:       General: He is in acute distress.   HENT:      Head: Normocephalic and atraumatic.      Mouth/Throat:      Mouth: Mucous membranes are moist.   Cardiovascular:      Rate and Rhythm: Normal rate.      Pulses:           Posterior  tibial pulses are 2+ on the right side and 2+ on the left side.      Heart sounds: Normal heart sounds. No murmur heard.  Pulmonary:      Effort: Pulmonary effort is normal. No respiratory distress.      Breath sounds: Wheezing ( Occasional, mild, expiratory) present.   Abdominal:      General: Bowel sounds are normal.      Palpations: Abdomen is soft.      Tenderness: There is abdominal tenderness in the suprapubic area. There is no guarding or rebound.      Comments: Suprapubic catheter site with some small amount of granulomatous tissue   Musculoskeletal:      Cervical back: Normal range of motion.      Comments: Patient with contractures, atrophy all extremities   Skin:     General: Skin is warm and dry.   Neurological:      Mental Status: He is alert. He is disoriented.      Comments: Patient reports it is February   Psychiatric:         Mood and Affect: Affect normal.         LAB RESULTS  Lab Results (last 24 hours)     Procedure Component Value Units Date/Time    CBC & Differential [[id number]]  (Abnormal) Collected: 03/20/22 1308    Specimen: Blood Updated: 03/20/22 1324    Narrative:      The following orders were created for panel order CBC & Differential.  Procedure                               Abnormality         Status                     ---------                               -----------         ------                     CBC Auto Differential[[id number]]        Abnormal            Final result                 Please view results for these tests on the individual orders.    Comprehensive Metabolic Panel [[id number]]  (Abnormal) Collected: 03/20/22 1308    Specimen: Blood Updated: 03/20/22 1344     Glucose 112 mg/dL      BUN 19 mg/dL      Creatinine 0.57 mg/dL      Sodium 140 mmol/L      Potassium 4.5 mmol/L      Chloride 102 mmol/L      CO2 28.0 mmol/L      Calcium 9.3 mg/dL      Total Protein 7.8 g/dL      Albumin 4.20 g/dL      ALT (SGPT) 10 U/L      AST (SGOT) 11 U/L      Alkaline Phosphatase 168 U/L       Total Bilirubin 0.4 mg/dL      Globulin 3.6 gm/dL      A/G Ratio 1.2 g/dL      BUN/Creatinine Ratio 33.3     Anion Gap 10.0 mmol/L      eGFR 99.1 mL/min/1.73      Comment: National Kidney Foundation and American Society of Nephrology (ASN) Task Force recommended calculation based on the Chronic Kidney Disease Epidemiology Collaboration (CKD-EPI) equation refit without adjustment for race.       Narrative:      GFR Normal >60  Chronic Kidney Disease <60  Kidney Failure <15      Valproic Acid Level, Total [[id number]]  (Abnormal) Collected: 03/20/22 1308    Specimen: Blood Updated: 03/20/22 1344     Valproic Acid 39.3 mcg/mL     Narrative:      Therapeutic Ranges for Valproic Acid    Epilepsy:       mcg/ml  Bipolar/Cassie  up to 125 mcg/ml      CBC Auto Differential [[id number]]  (Abnormal) Collected: 03/20/22 1308    Specimen: Blood Updated: 03/20/22 1324     WBC 9.11 10*3/mm3      RBC 5.77 10*6/mm3      Hemoglobin 15.5 g/dL      Hematocrit 50.1 %      MCV 86.8 fL      MCH 26.9 pg      MCHC 30.9 g/dL      RDW 14.5 %      RDW-SD 45.4 fl      MPV 9.9 fL      Platelets 301 10*3/mm3      Neutrophil % 73.6 %      Lymphocyte % 13.6 %      Monocyte % 9.5 %      Eosinophil % 2.3 %      Basophil % 0.7 %      Immature Grans % 0.3 %      Neutrophils, Absolute 6.70 10*3/mm3      Lymphocytes, Absolute 1.24 10*3/mm3      Monocytes, Absolute 0.87 10*3/mm3      Eosinophils, Absolute 0.21 10*3/mm3      Basophils, Absolute 0.06 10*3/mm3      Immature Grans, Absolute 0.03 10*3/mm3      nRBC 0.0 /100 WBC     Urinalysis With Microscopic If Indicated (No Culture) - Urine, Catheter [[id number]]  (Abnormal) Collected: 03/20/22 1502    Specimen: Urine, Catheter Updated: 03/20/22 1529     Color, UA Yellow     Appearance, UA Turbid     pH, UA 6.5     Specific Gravity, UA 1.016     Glucose, UA Negative     Ketones, UA Trace     Bilirubin, UA Negative     Blood, UA Large (3+)     Protein,  mg/dL (2+)     Leuk Esterase, UA Large (3+)      Nitrite, UA Positive     Urobilinogen, UA 1.0 E.U./dL    Urinalysis, Microscopic Only - Urine, Catheter [[id number]]  (Abnormal) Collected: 03/20/22 1502    Specimen: Urine, Catheter Updated: 03/20/22 1529     RBC, UA Too Numerous to Count /HPF      WBC, UA Too Numerous to Count /HPF      Bacteria, UA 4+ /HPF      Squamous Epithelial Cells, UA 7-12 /HPF      Hyaline Casts, UA 3-6 /LPF      Methodology Manual Light Microscopy    Urine Culture - Urine, Urine, Catheter [[id number]] Collected: 03/20/22 1502    Specimen: Urine, Catheter Updated: 03/20/22 6663          I ordered the above labs and reviewed the results    RADIOLOGY  CT Abdomen Pelvis Without Contrast   Final Result       There is some minimal chronic pleural reaction at the left lung base.  The liver, spleen, pancreas, and both adrenal glands are unremarkable  without intravenous contrast. There are several nonobstructing stones in  both kidneys. A previous 2.3 cm stone in the posterior right kidney is  now much smaller in size with several stone fragments extending into the  right renal pelvis but no associated obstruction is currently seen.  There is some slight haziness surrounding the right renal pelvis and  proximal ureter which is nonspecific but could relate to an element of  pyelonephritis and further clinical correlation is recommended.  2. There is no aortic aneurysm or retroperitoneal lymphadenopathy. The  large and small bowel loops are normal in caliber and show no  inflammatory change.  3. In the pelvis there is enlargement of the prostate measuring 5.5 cm.  A suprapubic catheter ends within the urinary bladder which is partially  decompressed and shows no change from the previous exam. There is some  generalized wall thickening of the bladder as well as a small  right-sided bladder diverticulum that is unchanged.    I ordered the above noted radiological studies. Interpreted by radiologist. Viewed by me in PACS.        PROCEDURES  Procedures      PROGRESS AND CONSULTS  ED Course as of 03/20/22 2121   Sun Mar 20, 2022   1517 Discussed with Dr. Bryan patient CT shows some stones at the right ureteral pelvis, some haziness in this area without signs of obstruction, [place] within the bladder with small volume of urine within the bladder with chronic thick walled appearance of the bladder. [TO]      ED Course User Index  [TO] Nohemy Lombardo MD   Given patient afebrile, white count normal, nontachycardic we will treat for lower urinary tract infection with plan for close follow-up with patient's urologist, Dr. Giles for further testing, treatment as needed for CT abnormalities    RN with removal and replacement of suprapubic catheter in sterile manner after unsuccessful attempts at irrigation, clearance of existing suprapubic catheter    MEDICAL DECISION MAKING  Results were reviewed/discussed with the patient and they were also made aware of online access. Pt also made aware that some labs, such as cultures, will not be resulted during ER visit and followup with PMD is necessary.       MDM       DIAGNOSIS  Final diagnoses:   Obstruction of indwelling urinary catheter, initial encounter (HCC)   Urinary tract infection associated with cystostomy catheter, initial encounter (HCC)       DISPOSITION  DISCHARGE    Patient discharged in stable condition.    Reviewed implications of results, diagnosis, meds, responsibility to follow up, warning signs and symptoms of possible worsening, potential complications and reasons to return to ER    Patient/Family voiced understanding of above instructions.    Discussed plan for discharge, as there is no emergent indication for admission. Patient referred to primary care provider for BP management due to today's BP. Pt/family is agreeable and understands need for follow up and repeat testing.  Pt is aware that discharge does not mean that nothing is wrong but it indicates no emergency is  present that requires admission and they must continue care with follow-up as given below or physician of their choice.     FOLLOW-UP  Monica James MD  [address] [facility] [address]  [phone]    Schedule an appointment as soon as possible for a visit in 2 days  EVEN IF WELL    Kemar Giles MD  [address] [place] [address] [place] [address]  [phone]    Schedule an appointment as soon as possible for a visit in 3 days  EVEN IF WELL         Medication List      New Prescriptions    sulfamethoxazole-trimethoprim 800-160 MG per tablet  Commonly known as: BACTRIM DS,SEPTRA DS  Take 1 tablet by mouth 2 (Two) Times a Day for 7 days.        Stop    ciprofloxacin 500 MG tablet  Commonly known as: CIPRO           Where to Get Your Medications      You can get these medications from any pharmacy    Bring a paper prescription for each of these medications  · sulfamethoxazole-trimethoprim 800-160 MG per tablet           Latest Documented Vital Signs:  As of 21:21 EDT  BP- 148/64 HR- 63 Temp- 98.6 °F (37 °C) O2 sat- 95%    --  Patient was wearing facemask when I entered the room and throughout our encounter. Full protective equipment was worn throughout this patient encounter including a face mask, eye protection and gloves. Hand hygiene was performed before donning protective equipment and after removal when leaving the room.      Nohemy Lombardo MD  03/20/22 3149

## 2022-03-20 NOTE — ED NOTES
Clogged suprapubic catheter removed. Site cleaned with providine-iodine. 18F catheter with standard drainage bag replaced clogged catheter. 100 mL urine return.

## 2022-03-22 LAB — BACTERIA SPEC AEROBE CULT: ABNORMAL

## 2023-04-11 NOTE — PROGRESS NOTES
"Pt processed being sad because he can \"no longer do some of the things I used to do\" \"like writing\" Pt oriented X4 with \"no\" current SI or HI Pt processed frustration with not having more activities. Discussed senior centers and activities there \"I will think about it\" Pt processed gratitude for his aid Lori \"she is kind and takes good care of me\" \"I have payee who takes care of my money\" Pt agreeable to come back to work with this therapist  " [FreeTextEntry1] : Ectodermal dysplasia\par Thumb and palm deformity\par Index finger Extension contracture\par \par I advised continued occupational therapy.  This hand may ultimately require surgical intervention.